# Patient Record
Sex: FEMALE | Race: WHITE | Employment: OTHER | URBAN - METROPOLITAN AREA
[De-identification: names, ages, dates, MRNs, and addresses within clinical notes are randomized per-mention and may not be internally consistent; named-entity substitution may affect disease eponyms.]

---

## 2017-01-27 RX ORDER — BENAZEPRIL HYDROCHLORIDE 10 MG/1
TABLET ORAL
Qty: 90 TAB | Refills: 0 | Status: SHIPPED | OUTPATIENT
Start: 2017-01-27 | End: 2017-05-07 | Stop reason: SDUPTHER

## 2017-01-27 RX ORDER — AMLODIPINE BESYLATE 2.5 MG/1
TABLET ORAL
Qty: 90 TAB | Refills: 0 | Status: SHIPPED | OUTPATIENT
Start: 2017-01-27 | End: 2017-05-07 | Stop reason: SDUPTHER

## 2017-02-20 ENCOUNTER — OFFICE VISIT (OUTPATIENT)
Dept: CARDIOLOGY CLINIC | Age: 82
End: 2017-02-20

## 2017-02-20 DIAGNOSIS — Z95.0 CARDIAC PACEMAKER IN SITU: Primary | ICD-10-CM

## 2017-02-28 ENCOUNTER — HOSPITAL ENCOUNTER (OUTPATIENT)
Dept: LAB | Age: 82
Discharge: HOME | End: 2017-02-28
Payer: MEDICARE

## 2017-02-28 ENCOUNTER — OFFICE VISIT (OUTPATIENT)
Dept: INTERNAL MEDICINE CLINIC | Age: 82
End: 2017-02-28

## 2017-02-28 VITALS
HEIGHT: 65 IN | BODY MASS INDEX: 35.72 KG/M2 | WEIGHT: 214.4 LBS | TEMPERATURE: 97.7 F | HEART RATE: 89 BPM | OXYGEN SATURATION: 94 % | SYSTOLIC BLOOD PRESSURE: 120 MMHG | DIASTOLIC BLOOD PRESSURE: 80 MMHG | RESPIRATION RATE: 16 BRPM

## 2017-02-28 DIAGNOSIS — E11.9 DIABETES MELLITUS TYPE 2, DIET-CONTROLLED (HCC): ICD-10-CM

## 2017-02-28 DIAGNOSIS — Z79.899 ENCOUNTER FOR LONG-TERM (CURRENT) DRUG USE: ICD-10-CM

## 2017-02-28 DIAGNOSIS — E78.2 MIXED HYPERLIPIDEMIA: ICD-10-CM

## 2017-02-28 DIAGNOSIS — I10 BENIGN HYPERTENSION: Primary | ICD-10-CM

## 2017-02-28 PROCEDURE — 36415 COLL VENOUS BLD VENIPUNCTURE: CPT

## 2017-02-28 PROCEDURE — 80053 COMPREHEN METABOLIC PANEL: CPT

## 2017-02-28 PROCEDURE — 83036 HEMOGLOBIN GLYCOSYLATED A1C: CPT

## 2017-02-28 NOTE — PROGRESS NOTES
Kelsey Baxter is a 80 y.o. female    Chief Complaint   Patient presents with    Follow-up     diabetes mellitus type 2, hypertension and hyperlipidemia     1. Have you been to the ER, urgent care clinic since your last visit? Hospitalized since your last visit? No    2. Have you seen or consulted any other health care providers outside of the [address] [place] [address] since your last visit? Include any pap smears or colon screening.  No

## 2017-02-28 NOTE — PROGRESS NOTES
Subjective:      Cyndee Lord is a 80 y.o. female who presents today hypertension , hyperlipidemia and diet controlled diabetes mellitus     Living with her son. She has been doing well. She has a pacemaker and follows with her cardiologist regularly. She recently had cortisone injections in her right elbow and shoulder. Still bowling periodically. She denies any chest pain, shortness of breath, syncope, headaches, nausea/vomiting, or any bowel changes. She ran out of zetia about 1 month ago. Has not taken it since. 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     hyperlipidemia E78.5    Hypertension, benign I10    Cardiac pacemaker in situ Z95.0    S/P FERNANDO-BSO Z90.710, Z90.722, Z90.79    Allergy to environmental factors Z91.09    Rotator cuff tear, right M75.101    S/P right cataract extraction Z98.41    History of screening mammography Z92.89    Colon cancer screening Z12.11    Diabetes type 2, controlled ([facility] E11.9    Advance directive discussed with patient Z71.89    Diabetic [facility] exam ([facility] E11.9, Z01.00     Current Outpatient Prescriptions   Medication Sig Dispense Refill    amLODIPine (NORVASC) 2.5 mg tablet TAKE ONE TABLET BY MOUTH DAILY 90 Tab 0    benazepril (LOTENSIN) 10 mg tablet TAKE ONE TABLET BY MOUTH DAILY (GENERIC FOR LOTENSIN) 90 Tab 0    TRIAMCINOLONE ACETONIDE (NASACORT NA) by Nasal route as needed.  ezetimibe (ZETIA) 10 mg tablet Take 1 Tab by mouth daily. 90 Tab 3    co-enzyme Q-10 (CO Q-10) 100 mg capsule Take 1 Cap by mouth daily. 30 Cap 5    ibuprofen (ADVIL) 200 mg tablet Take  by mouth every six (6) hours as needed.  cetirizine (ZYRTEC) 5 mg tablet Take 10 mg by mouth daily. Review of Systems    Pertinent items are noted in HPI. Objective:      Wt Readings from Last 3 Encounters:   02/28/17 214 lb 6.4 oz (97.3 kg)   08/16/16 214 lb (97.1 kg)   07/07/16 215 lb (97.5 kg) Temp Readings from Last 3 Encounters:   02/28/17 97.7 °F (36.5 °C) (Oral)   08/16/16 97.3 °F (36.3 °C) (Oral)   07/07/16 97 °F (36.1 °C) (Oral)     BP Readings from Last 3 Encounters:   02/28/17 140/82   08/16/16 130/64   07/07/16 110/70     Pulse Readings from Last 3 Encounters:   02/28/17 89   08/16/16 88   07/07/16 95      Visit Vitals    /80    Pulse 89    Temp 97.7 °F (36.5 °C) (Oral)    Resp 16    Ht 5' 5\" (1.651 m)    Wt 214 lb 6.4 oz (97.3 kg)    SpO2 94%    BMI 35.68 kg/m2     General appearance: alert, cooperative, no distress, appears stated age  Head: Normocephalic, without obvious abnormality, atraumatic  Neck: supple, symmetrical, trachea midline, no adenopathy, no carotid bruit and no JVD  Lungs: clear to auscultation bilaterally  Heart: regular rate and rhythm, S1, S2 normal, no murmur, click, rub or gallop    Assessment/Plan:     1. Benign hypertension  -I evaluated and recommended to continue current doses of medications.     - METABOLIC PANEL, COMPREHENSIVE    2. Mixed hyperlipidemia  -has not taken zetia for last month. She is on the zetia prescription assistance plan. She may need to reapply, which she does yearly. - METABOLIC PANEL, COMPREHENSIVE    3. Diabetes mellitus type 2, diet-controlled ([facility]  -due for labs. Continue low sugar and carb diet. - METABOLIC PANEL, COMPREHENSIVE  - HEMOGLOBIN A1C WITH EAG    4. Encounter for long-term (current) drug use    - METABOLIC PANEL, COMPREHENSIVE     Orders Placed This Encounter    METABOLIC PANEL, COMPREHENSIVE    HEMOGLOBIN A1C WITH EAG        Follow-up Disposition:     Follow up 4 months      Return if symptoms worsen or fail to improve. Advised patient to call back or return to office if symptoms worsen/change/persist.     Discussed expected course/resolution/complications of diagnosis in detail with patient. Medication risks/benefits/costs/interactions/alternatives discussed with patient.    Patient was given an after visit summary which includes diagnoses, current medications, & vitals. Patient expressed understanding with the diagnosis and plan.

## 2017-02-28 NOTE — MR AVS SNAPSHOT
Visit Information Date & Time Provider Department Dept. Phone Encounter #  
 2/28/2017 11:40 AM Yisel Amin MD [place] [address] [facility] [phone] [id number] Follow-up Instructions Return in about 4 months (around 6/28/2017). Your Appointments 5/26/2017 11:30 AM  
PACEMAKER with [id number] (YULI SCHEDULING) Appt Note: mdt ppi, rc, annual thresh and new [address] [place]  
[address] [facility]  
  
    
 5/26/2017 11:40 AM  
ESTABLISHED PATIENT with Lonnie Dominguez MD  
[facility] ([facility]) Appt Note: 1 yr fu appt device check and seeing dr Tolentino [address] [place] [address]  
[address] [place] [address] Upcoming Health Maintenance Date Due  
 MEDICARE YEARLY EXAM 5/31/1999 Pneumococcal 65+ Low/Medium Risk (2 of 2 - PPSV23) 1/1/2010 DTaP/Tdap/Td series (2 - Td) 10/1/2016 HEMOGLOBIN A1C Q6M 2/16/2017 FOOT EXAM Q1 4/13/2017 MICROALBUMIN Q1 4/13/2017 EYE EXAM RETINAL OR DILATED Q1 5/12/2017 LIPID PANEL Q1 8/16/2017 GLAUCOMA SCREENING Q2Y 5/12/2018 Allergies as of 2/28/2017  Review Complete On: 2/28/2017 By: Yisel Amin MD  
  
 Severity Noted Reaction Type Reactions Ciprofloxacin  03/01/2012   Side Effect Other (comments) \"jittery\" Lipitor [Atorvastatin]  03/01/2012   Side Effect Myalgia Mevacor [Lovastatin]  03/01/2012   Side Effect Other (comments) dyspepsia Pcn [Penicillins]  04/04/2011   Side Effect Hives Zocor [Simvastatin]  03/01/2012   Side Effect Myalgia Current Immunizations  Reviewed on 8/16/2016 Name Date Influenza Vaccine 12/11/2015, 10/1/2014, 11/1/2012 Pneumococcal Vaccine (Unspecified Type) 1/1/2005 TDAP Vaccine 10/1/2006 Zoster Vaccine, Live 11/1/2012 Not reviewed this visit You Were Diagnosed With   
  
 Codes Comments Benign hypertension    -  Primary ICD-10-CM: I10 
ICD-9-CM: 401.1 Mixed hyperlipidemia     ICD-10-CM: E78.2 ICD-9-CM: 272.2 Diabetes mellitus type 2, diet-controlled ([facility]     ICD-10-CM: E11.9 ICD-9-CM: 250.00 Encounter for long-term (current) drug use     ICD-10-CM: Z79.899 ICD-9-CM: V58.69 Vitals BP  
  
  
  
  
  
 120/80 Vitals History BMI and BSA Data Body Mass Index Body Surface Area  
 35.68 kg/m 2 2.11 m 2 Preferred Pharmacy Pharmacy Name Phone [facility] [place], [address] [facility]. [phone] Your Updated Medication List  
  
   
This list is accurate as of: 2/28/17  1:21 PM.  Always use your most recent med list. ADVIL 200 mg tablet Generic drug:  ibuprofen Take  by mouth every six (6) hours as needed. amLODIPine 2.5 mg tablet Commonly known as:  Sanchez Fanti TAKE ONE TABLET BY MOUTH DAILY  
  
 benazepril 10 mg tablet Commonly known as:  LOTENSIN  
TAKE ONE TABLET BY MOUTH DAILY (GENERIC FOR LOTENSIN) co-enzyme Q-10 100 mg capsule Commonly known as:  CO Q-10 Take 1 Cap by mouth daily. ezetimibe 10 mg tablet Commonly known as:  Mallie Drea Take 1 Tab by mouth daily. NASACORT NA  
by Nasal route as needed. ZyrTEC 5 mg tablet Generic drug:  cetirizine Take 10 mg by mouth daily. We Performed the Following HEMOGLOBIN A1C WITH EAG [51767 CPT(R)] METABOLIC PANEL, COMPREHENSIVE [52478 CPT(R)] Follow-up Instructions Return in about 4 months (around 6/28/2017). Introducing [facility]! Dear [place]: 
Thank you for requesting a [employer] account. Our records indicate that you already have an active [employer] account.   You can access your account anytime at [email or web address] Did you know that you can access your hospital and ER discharge instructions at any time in [employer]? You can also review all of your test results from your hospital stay or ER visit. Additional Information If you have questions, please visit the Frequently Asked Questions section of the [employer] website at [email or web address] Remember, [employer] is NOT to be used for urgent needs. For medical emergencies, dial 911. Now available from your iPhone and Android! Please provide this summary of care documentation to your next provider. Your primary care clinician is listed as Brea Lee. If you have any questions after today's visit, please call [phone].

## 2017-03-01 LAB
ALBUMIN SERPL-MCNC: 4.6 G/DL (ref 3.5–4.7)
ALBUMIN/GLOB SERPL: 1.9 {RATIO} (ref 1.1–2.5)
ALP SERPL-CCNC: 67 IU/L (ref 39–117)
ALT SERPL-CCNC: 24 IU/L (ref 0–32)
AST SERPL-CCNC: 23 IU/L (ref 0–40)
BILIRUB SERPL-MCNC: 0.5 MG/DL (ref 0–1.2)
BUN SERPL-MCNC: 28 MG/DL (ref 8–27)
BUN/CREAT SERPL: 30 (ref 11–26)
CALCIUM SERPL-MCNC: 9.5 MG/DL (ref 8.7–10.3)
CHLORIDE SERPL-SCNC: 100 MMOL/L (ref 96–106)
CO2 SERPL-SCNC: 23 MMOL/L (ref 18–29)
CREAT SERPL-MCNC: 0.93 MG/DL (ref 0.57–1)
EST. AVERAGE GLUCOSE BLD GHB EST-MCNC: 143 MG/DL
GLOBULIN SER CALC-MCNC: 2.4 G/DL (ref 1.5–4.5)
GLUCOSE SERPL-MCNC: 108 MG/DL (ref 65–99)
HBA1C MFR BLD: 6.6 % (ref 4.8–5.6)
INTERPRETATION: NORMAL
Lab: NORMAL
POTASSIUM SERPL-SCNC: 4.4 MMOL/L (ref 3.5–5.2)
PROT SERPL-MCNC: 7 G/DL (ref 6–8.5)
SODIUM SERPL-SCNC: 141 MMOL/L (ref 134–144)

## 2017-05-08 RX ORDER — AMLODIPINE BESYLATE 2.5 MG/1
TABLET ORAL
Qty: 90 TAB | Refills: 0 | Status: SHIPPED | OUTPATIENT
Start: 2017-05-08 | End: 2017-08-08 | Stop reason: SDUPTHER

## 2017-05-08 RX ORDER — BENAZEPRIL HYDROCHLORIDE 10 MG/1
TABLET ORAL
Qty: 90 TAB | Refills: 0 | Status: SHIPPED | OUTPATIENT
Start: 2017-05-08 | End: 2017-08-08 | Stop reason: SDUPTHER

## 2017-06-01 ENCOUNTER — OFFICE VISIT (OUTPATIENT)
Dept: INTERNAL MEDICINE CLINIC | Age: 82
End: 2017-06-01

## 2017-06-01 VITALS
HEIGHT: 65 IN | OXYGEN SATURATION: 93 % | WEIGHT: 214 LBS | TEMPERATURE: 99.2 F | HEART RATE: 65 BPM | DIASTOLIC BLOOD PRESSURE: 60 MMHG | BODY MASS INDEX: 35.65 KG/M2 | RESPIRATION RATE: 14 BRPM | SYSTOLIC BLOOD PRESSURE: 134 MMHG

## 2017-06-01 DIAGNOSIS — Z13.39 SCREENING FOR ALCOHOLISM: ICD-10-CM

## 2017-06-01 DIAGNOSIS — Z00.00 MEDICARE ANNUAL WELLNESS VISIT, SUBSEQUENT: Primary | ICD-10-CM

## 2017-06-01 DIAGNOSIS — J06.9 VIRAL URI WITH COUGH: ICD-10-CM

## 2017-06-01 DIAGNOSIS — Z00.00 ROUTINE GENERAL MEDICAL EXAMINATION AT A HEALTH CARE FACILITY: ICD-10-CM

## 2017-06-01 DIAGNOSIS — Z13.31 SCREENING FOR DEPRESSION: ICD-10-CM

## 2017-06-01 DIAGNOSIS — R07.89 CHEST TIGHTNESS: ICD-10-CM

## 2017-06-01 RX ORDER — ALBUTEROL SULFATE 90 UG/1
2 AEROSOL, METERED RESPIRATORY (INHALATION)
Qty: 1 INHALER | Refills: 0 | Status: ON HOLD | OUTPATIENT
Start: 2017-06-01 | End: 2017-08-18

## 2017-06-01 RX ORDER — AZITHROMYCIN 250 MG/1
250 TABLET, FILM COATED ORAL SEE ADMIN INSTRUCTIONS
Qty: 6 TAB | Refills: 0 | Status: SHIPPED | OUTPATIENT
Start: 2017-06-01 | End: 2017-06-06

## 2017-06-01 NOTE — PATIENT INSTRUCTIONS
1. Mucinex (Guaifenesen) plain-Blue Box 600 mg. Take one twice daily with full glass water   Take for 10 days    2. Saline Nasal Spray - use liberally to flush post-nasal area; use as many times a day as desired. Keep spraying with head tilted back until you feel need to swallow    3. Drink lots of fluids (mainly water) to keep mucus thinner    4. If needed, for cough, we recommend Delsym cough syrup    5. Decongestants should only be used for about 3 days. After that, they actually contribute to overdrying/thickening of the mucus, and can raise the BP and overstimulate the heart    6. Steroid nasal spray (Nasacort AQ) - 2 sprays each nostril once daily; use with head in upright position    Medicare Part B Preventive Services Guidelines/Limitations Date last completed and Frequency Due Date   Bone Mass Measurement  (age 72 & older, biennial) Requires diagnosis related to osteoporosis or estrogen deficiency.  Biennial benefit unless patient has history of long-term glucocorticoid tx or baseline is needed because initial test was by other method Completed Recommended every 2 years Not indicated   Cardiovascular Screening Blood Tests (every 5 years)  Total cholesterol, HDL, Triglycerides Order as a panel if possible Completed 8/16/2016  Recommended annually Due 8/16/2017   Colorectal Cancer Screening  -Fecal occult blood test (annual)  -Flexible sigmoidoscopy (5y)  -Screening colonoscopy (10y)  -Barium Enema  Completed 2010  Recommended every 5-10 years  Complete    Counseling to Prevent Tobacco Use (up to 8 sessions per year)  - Counseling greater than 3 and up to 10 minutes  - Counseling greater than 10 minutes Patients must be asymptomatic of tobacco-related conditions to receive as preventive service  Not applicable     Diabetes Screening Tests (at least every 3 years, Medicare covers annually or at 6-month intervals for prediabetic patients)    Fasting blood sugar (FBS) or glucose tolerance test (GTT) Patient must be diagnosed with one of the following:  -Hypertension, Dyslipidemia, obesity, previous impaired FBS or GTT  Or any two of the following: overweight, FH of diabetes, age ? 72, history of gestational diabetes, birth of baby weighing more than 9 pounds Completed: 2/28/2017    Recommended every 3 years for non-diabetics    Recommended every 3-6 months for Pre-Diabetics and Diabetics Due 6/2017   Diabetes Self-Management Training (DSMT) (no USPSTF recommendation) Requires referral by treating physician for patient with diabetes or renal disease. 10 hours of initial DSMT session of no less than 30 minutes each in a continuous 12-month period. 2 hours of follow-up DSMT in subsequent years. Please let us know if you would like a referral   Glaucoma Screening (no USPSTF recommendation) Diabetes mellitus, family history, , age 48 or over,  American, age 72 or over Completed May 2017  Recommended annually Due May 2018   Human Immunodeficiency Virus (HIV) Screening (annually for increased risk patients)  HIV-1 and HIV-2 by EIA, MING, rapid antibody test, or oral mucosa transudate Patient must be at increased risk for HIV infection per USPSTF guidelines or pregnant. Tests covered annually for patients at increased risk. Pregnant patients may receive up to 3 test during pregnancy. Not applicable   Medical Nutrition Therapy (MNT) (for diabetes or renal disease not recommended schedule) Requires referral by treating physician for patient with diabetes or renal disease. Can be provided in same year as diabetes self-management training (DSMT), and CMS recommends medical nutrition therapy take place after DSMT. Up to 3 hours for initial year and 2 hours in subsequent years.   Please let us know if you would like a referral   Prostate Cancer Screening (annually up to age 76)  - Digital rectal exam (MARIO)  - Prostate specific antigen (PSA) Annually (age 48 or over), MARIO not paid separately when covered E/M service is provided on same date Completed  Recommended annually to age 76 Not applicable   Seasonal Influenza Vaccination (annually)  Completed 11/15/2016  Recommended Annually Due Fall 2017   Pneumococcal Vaccination (once after 72)  Pneumococcal 23 -10/19/2005  Recommended once over the age of 72    Prevnar 15 - 11/15/2016  Recommended once over the age of 72 Complete          Complete   Hepatitis B Vaccinations (if medium/high risk) Medium/high risk factors:  End-stage renal disease,  Hemophiliacs who received Factor VIII or IX concentrates, Clients of institutions for the mentally retarded, Persons who live in the same house as a HepB virus carrier, Homosexual men, Illicit injectable drug abusers. Not applicable   Screening Mammography (biennial age 54-69)? Annually (age 36 or over) Completed 2012   Complete   Screening Pap Tests and Pelvic Examination (up to age 79 and after 79 if unknown history or abnormal study last 10 years) Every 24 months except high risk Completed  Complete     Ultrasound Screening for Abdominal Aortic Aneurysm (AAA) (once) Patient must be referred through IPPE and not have had a screening for abdominal aortic aneurysm before under Medicare. Limited to patients who meet one of the following criteria:  - Men who are 73-68 years old and have smoked more than 100 cigarettes in their lifetime.  -Anyone with a FH of AAA  -Anyone recommended for screening by USPSTF  Not applicable   Family Practice Management 2011  When you completed your advance medical directive please bring a copy to the office to add to your medical record.

## 2017-06-01 NOTE — PROGRESS NOTES
79 yo female reports drippy nose, sneezing and shortness of breath for last 4-5 days. She has been using Nasacort AQ and OTC Delsym. She had been staying at her place on the [facility], and with all the rain, she believes this is what started her sxs which initially started with a head cold. She came back to [address] yesterday after the cough and feeling of tightness in the lower chest worsened. There is not much sputum production. PE: Overweight WF w/ coarse cough   T - 99.2   Phar - red   Neck - no nodes   Lungs - clear on inspiration, but coarse rhonchi on exhalation    Imp: URI w/ cough   Bronchospasm    Plan: Mucinex BID   Increase hydration    Rescue inhaler   Z-pack  _____________________________  Expected course of current diagnosed problem(s) as well as expected progression and possible complications, and desired follow up with provider are discussed with patient. Patient is encouraged to be back in touch with any questions or concerns. Patient expresses understanding of plan of care. Patient is given AVS which includes diagnoses, current medications, vitals.

## 2017-06-01 NOTE — PROGRESS NOTES
This is an Initial Medicare Annual Wellness Exam (AWV) (Performed 12 months after IPPE or effective date of Medicare Part B enrollment, Once in a lifetime)    I have reviewed the patient's medical history in detail and updated the computerized patient record. History     Past Medical History:   Diagnosis Date     hyperlipidemia 4/4/2011    Allergy to environmental factors 3/2/2012    Atrioventricular block, complete ([address] 4/4/2011    Cardiac pacemaker in situ 4/4/2011    Hypercholesterolemia     Hypertension     Hypertension, benign 4/4/2011    Rotator cuff tear 8/2011    right    S/P FERNANDO-BSO 3/2/2012      Past Surgical History:   Procedure Laterality Date    CARDIAC SURG PROCEDURE UNLIST  03/08    Pacemaker Battery Replacement    CARDIAC SURG PROCEDURE UNLIST  1996    Pacemaker Implant    ENDOSCOPY, COLON, DIAGNOSTIC  11/18/08    polyp (Brand)    HX HEENT  6/7/10    ELIAZAR O.D.    HX ORTHOPAEDIC  1992    R Elbow Fx - ORIF    HX FERNANDO AND BSO  1975     Current Outpatient Prescriptions   Medication Sig Dispense Refill    azithromycin (ZITHROMAX) 250 mg tablet Take 1 Tab by mouth See Admin Instructions for 5 days. 6 Tab 0    albuterol (PROVENTIL HFA, VENTOLIN HFA, PROAIR HFA) 90 mcg/actuation inhaler Take 2 Puffs by inhalation every six (6) hours as needed for Wheezing (or chest tightness). 1 Inhaler 0    amLODIPine (NORVASC) 2.5 mg tablet TAKE ONE TABLET BY MOUTH DAILY 90 Tab 0    benazepril (LOTENSIN) 10 mg tablet TAKE ONE TABLET BY MOUTH DAILY **GENERIC LOTENSIN** 90 Tab 0    TRIAMCINOLONE ACETONIDE (NASACORT NA) by Nasal route as needed.  ezetimibe (ZETIA) 10 mg tablet Take 1 Tab by mouth daily. 90 Tab 3    co-enzyme Q-10 (CO Q-10) 100 mg capsule Take 1 Cap by mouth daily. 30 Cap 5    ibuprofen (ADVIL) 200 mg tablet Take  by mouth every six (6) hours as needed.  cetirizine (ZYRTEC) 5 mg tablet Take 10 mg by mouth daily.        Allergies   Allergen Reactions    Ciprofloxacin Other (comments)     \"jittery\"    Lipitor [Atorvastatin] Myalgia    Mevacor [Lovastatin] Other (comments)     dyspepsia    Pcn [Penicillins] Hives    Zocor [Simvastatin] Myalgia     Family History   Problem Relation Age of Onset    Cancer Mother     Stroke Father      Social History   Substance Use Topics    Smoking status: Former Smoker     Quit date: 4/8/2007    Smokeless tobacco: Never Used    Alcohol use 3.5 - 5.0 oz/week     7 - 10 Standard drinks or equivalent per week     Patient Active Problem List   Diagnosis Code    [facility] block[facility] ([facility] I44.2     hyperlipidemia E78.5    Hypertension, benign I10    Cardiac pacemaker in situ Z95.0    S/P FERNANDO-BSO Z90.710, Z90.722, Z90.79    Allergy to environmental factors Z91.09    Rotator cuff tear, right M75.101    S/P right cataract extraction Z98.41    History of screening mammography Z92.89    Colon cancer screening Z12.11    Diabetes type 2, controlled ([facility] E11.9    Advance directive discussed with patient Z71.89    Diabetic eye exam ([facility] E11.9, Z01.00         Depression Risk Factor Screening:     PHQ over the last two weeks 6/1/2017   Little interest or pleasure in doing things Not at all   Feeling down, depressed or hopeless Not at all   Total Score PHQ 2 0     Alcohol Risk Factor Screening: On any occasion during the past 3 months, have you had more than 3 drinks containing alcohol? Yes    Do you average more than 7 drinks per week? Unknown; patient states she did not know how much she drinks at one time. Functional Ability and Level of Safety:     Hearing Loss   None, per patient    Activities of Daily Living   Self-care.    Requires assistance with:   ADL Assessment 6/1/2017   Feeding yourself No Help Needed   Getting from bed to chair No Help Needed   Getting dressed No Help Needed   Bathing or showering No Help Needed   Walk across the room (includes cane/walker) No Help Needed   Using the telphone No Help Needed Taking your medications No Help Needed   Preparing meals No Help Needed   Managing money (expenses/bills) No Help Needed   Moderately strenuous housework (laundry) No Help Needed   Shopping for personal items (toiletries/medicines) No Help Needed   Shopping for groceries No Help Needed   Driving No Help Needed   Climbing a flight of stairs No Help Needed   Getting to places beyond walking distances No Help Needed     Patient states she walks for exercise. States she lives with her adult son. Patient state she independent. Denies using any assistive devices for ambulation. Fall Risk     Fall Risk Assessment, last 12 mths 6/1/2017   Able to walk? Yes   Fall in past 12 months? No     Abuse Screen      Abuse Screening Questionnaire 6/1/2017   Do you ever feel afraid of your partner? N   Are you in a relationship with someone who physically or mentally threatens you? N   Is it safe for you to go home? Y         Review of Systems   Not required  Annual Medicare Wellness Visit    Physical Examination     No exam data present    Evaluation of Cognitive Function:  Mood/affect:  neutral  Appearance: age appropriate, well dressed and within normal Limits  Family member/caregiver input: none present    No exam performed today, Annual Medicare Wellness Visit. Patient Care Team:  Zeenat Wayne MD as PCP - General (Internal Medicine)  Zeenat Wayne MD (Internal Medicine)  Cris Rocha [[id number]] (Cardiology)  Victoria Aguilera MD (Ophthalmology)    Advice/Referrals/Counseling   Education and counseling provided:  Are appropriate based on today's review and evaluation  End-of-Life planning (with patient's consent)  Screening Mammography  Screening for glaucoma      Assessment/Plan   1. Discussed advance care planning. Patient states she does have paperwork completed at home and states she will bring at next appointment to scan. 2. Discussed preventative screenings. Patient states she has not had a mammogram and quite some time. States she does not feel the need to have one at this time. Patient will contact office if she decides to have one in the future. Patient states she had glaucoma screening last week with Dr. Krzysztof Sam. AVS and preventative screenings table printed, reviewed, and handed to patient. Patient verbalized understanding to all information.

## 2017-06-01 NOTE — MR AVS SNAPSHOT
Visit Information Date & Time Provider Department Dept. Phone Encounter #  
 6/1/2017 11:00 AM [facility] [phone] Your Appointments 6/28/2017 10:45 AM  
PACEMAKER with PACEMAKER3RHIANNA [facility] (YULI SCHEDULING) Appt Note: 1 yr fu appt device check and seeing dr Santos Salas; 1 yr fu appt device check and seeing dr Young Parikh pt r/s from 5/26  
 [address]  
[phone]  
  
   
 [address]  
  
    
 6/28/2017 11:20 AM  
ESTABLISHED PATIENT with Kofi Griffith MD  
[facility] ([facility]) Appt Note: 1 yr fu appt device check and seeing dr Young Parikh pt r/s from 5/26  
 [address]  
[address] [address]  
  
    
 6/28/2017 11:40 AM  
ROUTINE CARE with MD Hilda Ruiz [facility] ([facility]) Appt Note: 4 mths [address], [address]  
[address], [address]ela Khurram [address] [address] Upcoming Health Maintenance Date Due  
 FOOT EXAM Q1 4/13/2017 MICROALBUMIN Q1 4/13/2017 EYE EXAM RETINAL OR DILATED Q1 5/12/2017 INFLUENZA AGE 9 TO ADULT 8/1/2017 LIPID PANEL Q1 8/16/2017 HEMOGLOBIN A1C Q6M 8/28/2017 GLAUCOMA SCREENING Q2Y 5/12/2018 MEDICARE YEARLY EXAM 6/2/2018 DTaP/Tdap/Td series (3 - Td) 11/1/2025 Allergies as of 6/1/2017  Review Complete On: 6/1/2017 By: Madelaine Rodgers LPN Severity Noted Reaction Type Reactions Ciprofloxacin  03/01/2012   Side Effect Other (comments) \"jittery\" Lipitor [Atorvastatin]  03/01/2012   Side Effect Myalgia Mevacor [Lovastatin]  03/01/2012   Side Effect Other (comments) dyspepsia Pcn [Penicillins]  04/04/2011   Side Effect Hives Zocor [Simvastatin]  03/01/2012   Side Effect Myalgia Current Immunizations  Reviewed on 2/28/2017 Name Date Influenza Vaccine 12/11/2015, 10/1/2014, 11/1/2012 Pneumococcal Conjugate (PCV-13) 11/15/2016 Pneumococcal Vaccine (Unspecified Type) 1/1/2005 TDAP Vaccine 10/1/2006 Tdap 11/1/2015 Zoster Vaccine, Live 11/1/2012 Not reviewed this visit You Were Diagnosed With   
  
 Codes Comments Medicare annual wellness visit, subsequent    -  Primary ICD-10-CM: Z00.00 ICD-9-CM: V70.0 Viral URI with cough     ICD-10-CM: J06.9, B97.89 ICD-9-CM: 465.9 Chest tightness     ICD-10-CM: R07.89 ICD-9-CM: 786.59 Vitals BP Pulse Temp Resp Height(growth percentile) Weight(growth percentile) 134/60 65 99.2 °F (37.3 °C) (Oral) 14 5' 5\" (1.651 m) 214 lb (97.1 kg) SpO2 BMI OB Status Smoking Status 93% 35.61 kg/m2 Hysterectomy Former Smoker Vitals History BMI and BSA Data Body Mass Index Body Surface Area  
 35.61 kg/m 2 2.11 m 2 Preferred Pharmacy Pharmacy Name Phone [facility] Vu [address] [place] [address], [facility] Yara Velasco RDS. [phone] Your Updated Medication List  
  
   
This list is accurate as of: 6/1/17 12:03 PM.  Always use your most recent med list. ADVIL 200 mg tablet Generic drug:  ibuprofen Take  by mouth every six (6) hours as needed. albuterol 90 mcg/actuation inhaler Commonly known as:  PROVENTIL HFA, VENTOLIN HFA, PROAIR HFA Take 2 Puffs by inhalation every six (6) hours as needed for Wheezing (or chest tightness). amLODIPine 2.5 mg tablet Commonly known as:  Sherald Burkitt TAKE ONE TABLET BY MOUTH DAILY  
  
 azithromycin 250 mg tablet Commonly known as:  Lennox Prieto Take 1 Tab by mouth See Admin Instructions for 5 days. benazepril 10 mg tablet Commonly known as:  LOTENSIN  
 TAKE ONE TABLET BY MOUTH DAILY **GENERIC LOTENSIN**  
  
 co-enzyme Q-10 100 mg capsule Commonly known as:  CO Q-10 Take 1 Cap by mouth daily. ezetimibe 10 mg tablet Commonly known as:  Elver Heftrufino Take 1 Tab by mouth daily. NASACORT NA  
by Nasal route as needed. ZyrTEC 5 mg tablet Generic drug:  cetirizine Take 10 mg by mouth daily. Prescriptions Sent to Pharmacy Refills  
 azithromycin (ZITHROMAX) 250 mg tablet 0 Sig: Take 1 Tab by mouth See Admin Instructions for 5 days. Class: Normal  
 Pharmacy: [address] Ph #: [phone] Route: Oral  
 albuterol (PROVENTIL HFA, VENTOLIN HFA, PROAIR HFA) 90 mcg/actuation inhaler 0 Sig: Take 2 Puffs by inhalation every six (6) hours as needed for Wheezing (or chest tightness). Class: Normal  
 Pharmacy: [address] Ph #: [phone] Route: Inhalation Patient Instructions 1. Mucinex (Guaifenesen) plain-Blue Box 600 mg. Take one twice daily with full glass water Take for 10 days 2. Saline Nasal Spray - use liberally to flush post-nasal area; use as many times a day as desired. Keep spraying with head tilted back until you feel need to swallow 3. Drink lots of fluids (mainly water) to keep mucus thinner 4. If needed, for cough, we recommend Delsym cough syrup 5. Decongestants should only be used for about 3 days. After that, they actually contribute to overdrying/thickening of the mucus, and can raise the BP and overstimulate the heart 6. Steroid nasal spray (Nasacort AQ) - 2 sprays each nostril once daily; use with head in upright position Medicare Part B Preventive Services Guidelines/Limitations Date last completed and Frequency Due Date Bone Mass Measurement (age 72 & older, biennial) Requires diagnosis related to osteoporosis or estrogen deficiency. Biennial benefit unless patient has history of long-term glucocorticoid tx or baseline is needed because initial test was by other method Completed Recommended every 2 years Not indicated Cardiovascular Screening Blood Tests (every 5 years) Total cholesterol, HDL, Triglycerides Order as a panel if possible Completed 8/16/2016 Recommended annually Due 8/16/2017 Colorectal Cancer Screening 
-Fecal occult blood test (annual) -Flexible sigmoidoscopy (5y) 
-Screening colonoscopy (10y) -Barium Enema  Completed 2010 Recommended every 5-10 years  Complete Counseling to Prevent Tobacco Use (up to 8 sessions per year) - Counseling greater than 3 and up to 10 minutes - Counseling greater than 10 minutes Patients must be asymptomatic of tobacco-related conditions to receive as preventive service  Not applicable Diabetes Screening Tests (at least every 3 years, Medicare covers annually or at 6-month intervals for prediabetic patients) Fasting blood sugar (FBS) or glucose tolerance test (GTT) Patient must be diagnosed with one of the following: 
-Hypertension, Dyslipidemia, obesity, previous impaired FBS or GTT 
Or any two of the following: overweight, FH of diabetes, age ? 72, history of gestational diabetes, birth of baby weighing more than 9 pounds Completed: 2/28/2017 Recommended every 3 years for non-diabetics Recommended every 3-6 months for Pre-Diabetics and Diabetics Due 6/2017 Diabetes Self-Management Training (DSMT) (no USPSTF recommendation) Requires referral by treating physician for patient with diabetes or renal disease. 10 hours of initial DSMT session of no less than 30 minutes each in a continuous 12-month period. 2 hours of follow-up DSMT in subsequent years.   Please let us know if you would like a referral  
Glaucoma Screening (no USPSTF recommendation) Diabetes mellitus, family history, , age 48 or over,  American, age 72 or over Completed May 2017 Recommended annually Due May 2018 Human Immunodeficiency Virus (HIV) Screening (annually for increased risk patients) HIV-1 and HIV-2 by EIA, MING, rapid antibody test, or oral mucosa transudate Patient must be at increased risk for HIV infection per USPSTF guidelines or pregnant. Tests covered annually for patients at increased risk. Pregnant patients may receive up to 3 test during pregnancy. Not applicable Medical Nutrition Therapy (MNT) (for diabetes or renal disease not recommended schedule) Requires referral by treating physician for patient with diabetes or renal disease. Can be provided in same year as diabetes self-management training (DSMT), and CMS recommends medical nutrition therapy take place after DSMT. Up to 3 hours for initial year and 2 hours in subsequent years. Please let us know if you would like a referral  
Prostate Cancer Screening (annually up to age 76) - Digital rectal exam (MARIO) - Prostate specific antigen (PSA) Annually (age 48 or over), MARIO not paid separately when covered E/M service is provided on same date Completed Recommended annually to age 76 Not applicable Seasonal Influenza Vaccination (annually)  Completed 11/15/2016 Recommended Annually Due Fall 2017 Pneumococcal Vaccination (once after 65)  Pneumococcal 23 -10/19/2005 Recommended once over the age of 72 Prevnar 13  11/15/2016 Recommended once over the age of 72 Complete Complete Hepatitis B Vaccinations (if medium/high risk) Medium/high risk factors:  End-stage renal disease, Hemophiliacs who received Factor VIII or IX concentrates, Clients of institutions for the mentally retarded, Persons who live in the same house as a HepB virus carrier, Homosexual men, Illicit injectable drug abusers. Not applicable Screening Mammography (biennial age 54-69)? Annually (age 36 or over) Completed 2012 Complete Screening Pap Tests and Pelvic Examination (up to age 79 and after 79 if unknown history or abnormal study last 10 years) Every 24 months except high risk Completed  Complete Ultrasound Screening for Abdominal Aortic Aneurysm (AAA) (once) Patient must be referred through IPPE and not have had a screening for abdominal aortic aneurysm before under Medicare. Limited to patients who meet one of the following criteria: 
- Men who are 73-68 years old and have smoked more than 100 cigarettes in their lifetime. 
-Anyone with a FH of AAA 
-Anyone recommended for screening by USPSTF  Not applicable Family Practice Management 2011 When you completed your advance medical directive please bring a copy to the office to add to your medical record. Introducing [facility] & [facility]! Dear [place]: 
Thank you for requesting a [facility] account. Our records indicate that you already have an active [facility] account. You can access your account anytime at [email or web address] Did you know that you can access your hospital and ER discharge instructions at any time in [facility]? You can also review all of your test results from your hospital stay or ER visit. Additional Information If you have questions, please visit the Frequently Asked Questions section of the [facility] website at [email or web address] Remember, [facility] is NOT to be used for urgent needs. For medical emergencies, dial 911. Now available from your iPhone and Android! Please provide this summary of care documentation to your next provider. Your primary care clinician is listed as Brea Lee. If you have any questions after today's visit, please call [phone].

## 2017-06-01 NOTE — PROGRESS NOTES
1. Have you been to the ER, urgent care clinic since your last visit? Hospitalized since your last visit? No    2. Have you seen or consulted any other health care providers outside of the [address] [place] [address] since your last visit? Include any pap smears or colon screening. No     Chief Complaint   Patient presents with    Allergies     sneezing, runny nose, shortness of breath for the past 3 days.  Has been using nasacort and cough syrup OTC     Not fasting

## 2017-07-19 ENCOUNTER — HOSPITAL ENCOUNTER (OUTPATIENT)
Dept: MAMMOGRAPHY | Age: 82
Discharge: HOME | End: 2017-07-19
Attending: INTERNAL MEDICINE
Payer: MEDICARE

## 2017-07-19 DIAGNOSIS — Z12.31 VISIT FOR SCREENING MAMMOGRAM: ICD-10-CM

## 2017-07-19 PROCEDURE — 77067 SCR MAMMO BI INCL CAD: CPT

## 2017-07-26 ENCOUNTER — OFFICE VISIT (OUTPATIENT)
Dept: CARDIOLOGY CLINIC | Age: 82
End: 2017-07-26

## 2017-07-26 ENCOUNTER — CLINICAL SUPPORT (OUTPATIENT)
Dept: CARDIOLOGY CLINIC | Age: 82
End: 2017-07-26

## 2017-07-26 ENCOUNTER — HOSPITAL ENCOUNTER (OUTPATIENT)
Dept: LAB | Age: 82
Discharge: HOME | End: 2017-07-26
Payer: MEDICARE

## 2017-07-26 VITALS
OXYGEN SATURATION: 96 % | RESPIRATION RATE: 16 BRPM | WEIGHT: 211 LBS | BODY MASS INDEX: 35.16 KG/M2 | HEIGHT: 65 IN | DIASTOLIC BLOOD PRESSURE: 78 MMHG | SYSTOLIC BLOOD PRESSURE: 134 MMHG | HEART RATE: 65 BPM

## 2017-07-26 DIAGNOSIS — Z01.812 PRE-PROCEDURE LAB EXAM: ICD-10-CM

## 2017-07-26 DIAGNOSIS — I44.2 ATRIOVENTRICULAR BLOCK, COMPLETE (HCC): ICD-10-CM

## 2017-07-26 DIAGNOSIS — Z95.0 CARDIAC PACEMAKER IN SITU: Primary | ICD-10-CM

## 2017-07-26 DIAGNOSIS — Z95.0 CARDIAC PACEMAKER IN SITU: ICD-10-CM

## 2017-07-26 DIAGNOSIS — Z45.018 PACEMAKER END OF LIFE: Primary | ICD-10-CM

## 2017-07-26 PROCEDURE — 80048 BASIC METABOLIC PNL TOTAL CA: CPT

## 2017-07-26 PROCEDURE — 36415 COLL VENOUS BLD VENIPUNCTURE: CPT

## 2017-07-26 PROCEDURE — 85025 COMPLETE CBC W/AUTO DIFF WBC: CPT

## 2017-07-26 RX ORDER — CHLORHEXIDINE GLUCONATE 4 G/100ML
SOLUTION TOPICAL
Qty: 1 BOTTLE | Refills: 0 | Status: SHIPPED | OUTPATIENT
Start: 2017-07-26 | End: 2017-08-18

## 2017-07-26 NOTE — PATIENT INSTRUCTIONS
Your Pacer gen change procedure has been scheduled for 8/18/17 at 7:30am, at [facility].    Please report to Admitting Department by 6:15am, or 2 hours prior to your scheduled procedure. Please bring a list of your current medications and medication bottles, if able, to the hospital on this day. You will be unable to drive after your procedure so please make sure to bring someone with you to your procedure. You will need to have nothing to eat or drink after midnight, the night prior to your procedure. You may have small sips of water, if needed, to take with your medication. You will need labs drawn prior to your procedure. Please go to [facility] to have this done as soon as your able to. You should not stop your medications prior to your scheduled procedure. After your procedure, you will need to follow up with Dr. Leon Osman. Your follow-up appointment has been scheduled for 9/7/17 at 10:15am.     Hibiclens 4% topical solution has been ordered and sent into your pharmacy  Patient it start Hibiclens application 5 days prior to procedure date    Directions Hibiclens 4%: Start cleanse 5 days prior to procedure   1. Rinse area (upper chest and upper arms) with water. 2. Apply minimum amount necessary to scrub the upper chest area from shoulder/neck to mid line of chest and to below the nipple each of  5 nights before the day of the procedure  3. Let solution dry.

## 2017-07-26 NOTE — PROGRESS NOTES
Jessica Cuadra is a 80 y.o. female  Chief Complaint   Patient presents with    Pacemaker Check     1. Have you been to the ER, urgent care clinic since your last visit? Hospitalized since your last visit? No    2. Have you seen or consulted any other health care providers outside of the [address] [place] [address] since your last visit? Include any pap smears or colon screening.   No

## 2017-07-26 NOTE — MR AVS SNAPSHOT
Visit Information Date & Time Provider Department Dept. Phone Encounter #  
 7/26/2017 11:20 AM Raheem Quezada MD [facility] Bass [phone] [id number] Your Appointments 9/7/2017 10:15 AM  
PACEMAKER with [id number] [facility] (YULI SCHEDULING) Appt Note: 2 week PPM gen change [address] [address]  
[address] [place] [address]  
  
    
 9/7/2017 10:20 AM  
ESTABLISHED PATIENT with Raheem Quezada MD  
[facility] ([address] [place] [address] Road) Appt Note: 2 week PPM gen change [address] [place]  
[address] [place] [address]  
  
    
 9/13/2017 10:15 AM  
Office Visit with GATITO Stone [facility] ([address] [place] [address]) Appt Note: [id number] [facility], [address] [facility] [address]  
[address], [place] [address]  
  
    
 9/13/2017 11:40 AM  
ROUTINE CARE with MD Hilda Cha [facility] ([address] [place] [address]) Appt Note: 4 mths fup; 4 mths fup; r/s  
 [address], [address] [place] [address]  
[address], [address] [facility] [address] Upcoming Health Maintenance Date Due  
 EYE EXAM RETINAL OR DILATED Q1 5/12/2017 LIPID PANEL Q1 8/16/2017 MICROALBUMIN Q1 8/1/2017* FOOT EXAM Q1 8/3/2017* INFLUENZA AGE 9 TO ADULT 8/1/2017 HEMOGLOBIN A1C Q6M 8/28/2017 GLAUCOMA SCREENING Q2Y 5/12/2018 MEDICARE YEARLY EXAM 6/2/2018 DTaP/Tdap/Td series (3 - Td) 11/1/2025 *Topic was postponed. The date shown is not the original due date. Allergies as of 7/26/2017  Review Complete On: 7/26/2017 By: Haris Chanel LPN Severity Noted Reaction Type Reactions Ciprofloxacin  03/01/2012   Side Effect Other (comments) \"jittery\" Lipitor [Atorvastatin]  03/01/2012   Side Effect Myalgia Mevacor [Lovastatin]  03/01/2012   Side Effect Other (comments) dyspepsia Pcn [Penicillins]  04/04/2011   Side Effect Hives Zocor [Simvastatin]  03/01/2012   Side Effect Myalgia Current Immunizations  Reviewed on 2/28/2017 Name Date Influenza Vaccine 12/11/2015, 10/1/2014, 11/1/2012 Pneumococcal Conjugate (PCV-13) 11/15/2016 TDAP Vaccine 10/1/2006 Tdap 11/1/2015 ZZZ-RETIRED (DO NOT USE) Pneumococcal Vaccine (Unspecified Type) 1/1/2005 Zoster Vaccine, Live 11/1/2012 Not reviewed this visit You Were Diagnosed With   
  
 Codes Comments Atrioventricular block, complete (HCC)    -  Primary ICD-10-CM: O44.1 ICD-9-CM: 426.0 Pre-procedure lab exam     ICD-10-CM: J93.618 ICD-9-CM: V72.63 Vitals BP Pulse Resp Height(growth percentile) Weight(growth percentile) LMP  
 134/78 (BP 1 Location: Left arm, BP Patient Position: Sitting) 65 16 5' 5\" (1.651 m) 211 lb (95.7 kg) (LMP Unknown) SpO2 BMI OB Status Smoking Status 96% 35.11 kg/m2 Hysterectomy Former Smoker Vitals History BMI and BSA Data Body Mass Index Body Surface Area  
 35.11 kg/m 2 2.09 m 2 Preferred Pharmacy Pharmacy Name Phone [facility] [address] [facility] [place], [address] [place] [address] RDS. [phone] Your Updated Medication List  
  
   
This list is accurate as of: 7/26/17 12:04 PM.  Always use your most recent med list. ADVIL 200 mg tablet Generic drug:  ibuprofen Take 400 mg by mouth daily. albuterol 90 mcg/actuation inhaler Commonly known as:  PROVENTIL HFA, VENTOLIN HFA, PROAIR HFA Take 2 Puffs by inhalation every six (6) hours as needed for Wheezing (or chest tightness). amLODIPine 2.5 mg tablet Commonly known as:  Akuacarlie Cisneros TAKE ONE TABLET BY MOUTH DAILY  
  
 benazepril 10 mg tablet Commonly known as:  LOTENSIN  
TAKE ONE TABLET BY MOUTH DAILY **GENERIC LOTENSIN**  
  
 chlorhexidine 4 % liquid Commonly known as:  HIBICLENS Apply to the upper chest area from shoulder/neck to mid line of chest and to below the nipple every day, 5 days prior to the procedure. co-enzyme Q-10 100 mg capsule Commonly known as:  CO Q-10 Take 1 Cap by mouth daily. ezetimibe 10 mg tablet Commonly known as:  Berle Poncho Take 1 Tab by mouth daily. NASACORT NA  
by Nasal route as needed. ZyrTEC 5 mg tablet Generic drug:  cetirizine Take 10 mg by mouth daily. Prescriptions Sent to Pharmacy Refills  
 chlorhexidine (HIBICLENS) 4 % liquid 0 Sig: Apply to the upper chest area from shoulder/neck to mid line of chest and to below the nipple every day, 5 days prior to the procedure. Class: Normal  
 Pharmacy: [facility] [address] [place] [address].  #: [phone] We Performed the Following CBC WITH AUTOMATED DIFF [61305 CPT(R)] METABOLIC PANEL, BASIC [02352 CPT(R)] Patient Instructions Your Pacer gen change procedure has been scheduled for 8/18/17 at 7:30am, at [facility]. 
 
Please report to Admitting Department by 6:15am, or 2 hours prior to your scheduled procedure. Please bring a list of your current medications and medication bottles, if able, to the hospital on this day. You will be unable to drive after your procedure so please make sure to bring someone with you to your procedure. You will need to have nothing to eat or drink after midnight, the night prior to your procedure. You may have small sips of water, if needed, to take with your medication. You will need labs drawn prior to your procedure. Please go to [facility] to have this done as soon as your able to. You should not stop your medications prior to your scheduled procedure. After your procedure, you will need to follow up with Dr. Carlota Nguyen. Your follow-up appointment has been scheduled for 9/7/17 at 10:15am.  
 
Hibiclens 4% topical solution has been ordered and sent into your pharmacy Patient it start Hibiclens application 5 days prior to procedure date Directions Hibiclens 4%: Start cleanse 5 days prior to procedure 1. Rinse area (upper chest and upper arms) with water. 2. Apply minimum amount necessary to scrub the upper chest area from shoulder/neck to mid line of chest and to below the nipple each of  5 nights before the day of the procedure 3. Let solution dry. Introducing [facility] & [facility]! Dear [place]: 
Thank you for requesting a [facility] account. Our records indicate that you already have an active [facility] account. You can access your account anytime at [email or web address] Did you know that you can access your hospital and ER discharge instructions at any time in [facility]? You can also review all of your test results from your hospital stay or ER visit. Additional Information If you have questions, please visit the Frequently Asked Questions section of the [facility] website at [email or web address] Remember, [facility] is NOT to be used for urgent needs. For medical emergencies, dial 911. Now available from your iPhone and Android! Please provide this summary of care documentation to your next provider. Your primary care clinician is listed as Brea Lee. If you have any questions after today's visit, please call [phone].

## 2017-07-26 NOTE — PROGRESS NOTES
Cardiac Electrophysiology Office Consultation Note     Subjective:      Katelyn Camilo is a 80 y.o. patient who is seen for evaluation of dual chamber pacemaker. It is Medtronic and has reached end of life. The patient is pacemaker dependent. JAMAL has been since May. The patient has an escaped rhythm about 50 beats per minute. She is aware because of the symptoms of fatigue. The pacemaker lead is from [address] and RV pacing impedance is slightly above 1000 ohms. Pacing threshold is normal.         Patient Active Problem List   Diagnosis Code    Atrioventricular block, complete ([facility] I44.2     hyperlipidemia E78.5    Hypertension, benign I10    Cardiac pacemaker in situ Z95.0    S/P FERNANDO-BSO Z90.710, Z90.722, Z90.79    Allergy to environmental factors Z91.09    Rotator cuff tear, right M75.101    S/P right cataract extraction Z98.41    History of screening mammography Z92.89    Colon cancer screening Z12.11    Diabetes type 2, controlled ([facility] E11.9    Advance directive discussed with patient Z71.89    Diabetic eye exam ([facility] E11.9, Z01.00     Current Outpatient Prescriptions   Medication Sig Dispense Refill    chlorhexidine (HIBICLENS) 4 % liquid Apply to the upper chest area from shoulder/neck to mid line of chest and to below the nipple every day, 5 days prior to the procedure. 1 Bottle 0    albuterol (PROVENTIL HFA, VENTOLIN HFA, PROAIR HFA) 90 mcg/actuation inhaler Take 2 Puffs by inhalation every six (6) hours as needed for Wheezing (or chest tightness). 1 Inhaler 0    amLODIPine (NORVASC) 2.5 mg tablet TAKE ONE TABLET BY MOUTH DAILY 90 Tab 0    benazepril (LOTENSIN) 10 mg tablet TAKE ONE TABLET BY MOUTH DAILY **GENERIC LOTENSIN** 90 Tab 0    TRIAMCINOLONE ACETONIDE (NASACORT NA) by Nasal route as needed.  ezetimibe (ZETIA) 10 mg tablet Take 1 Tab by mouth daily. 90 Tab 3    co-enzyme Q-10 (CO Q-10) 100 mg capsule Take 1 Cap by mouth daily.  30 Cap 5    ibuprofen (ADVIL) 200 mg tablet Take 400 mg by mouth daily.  cetirizine (ZYRTEC) 5 mg tablet Take 10 mg by mouth daily. 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  Menopause     LMP-?    Rotator cuff tear 8/2011    right    S/P FERNANDO-BSO 3/2/2012     Past Surgical History:   Procedure Laterality Date    CARDIAC SURG PROCEDURE UNLIST  03/08    Pacemaker Battery Replacement    CARDIAC SURG PROCEDURE UNLIST  1996    Pacemaker Implant    CHEST SURGERY PROCEDURE UNLISTED      Pacemaker    ENDOSCOPY, COLON, DIAGNOSTIC  11/18/08    polyp (Brand)    HX HEENT  6/7/10    ELIAZAR O.D.    HX ORTHOPAEDIC  1992    R Elbow Fx - ORIF    HX FERNANDO AND BSO  1975     Family History   Problem Relation Age of Onset    Cancer Mother     Stroke Father      Social History   Substance Use Topics    Smoking status: Former Smoker     Quit date: 4/8/2007    Smokeless tobacco: Never Used    Alcohol use 3.5 - 5.0 oz/week     7 - 10 Standard drinks or equivalent per week      Comment: pt states she drinks vodka and burbon everyday        Review of Systems:   Constitutional: Negative for fever, chills, weight loss, + malaise/fatigue. HEENT: Negative for nosebleeds, vision changes. Respiratory: Negative for cough, hemoptysis  Cardiovascular: Negative for chest pain, palpitations, orthopnea, claudication, leg swelling, syncope, and PND.    Gastrointestinal: Negative for nausea, vomiting, diarrhea, blood in stool and melena. Genitourinary: Negative for dysuria, and hematuria. Musculoskeletal: Negative for myalgias, + arthralgia. Skin: Negative for rash. Heme: Does not bleed or bruise easily. Neurological: Negative for speech change and focal weakness     Objective:     Visit Vitals    /78 (BP 1 Location: Left arm, BP Patient Position: Sitting)    Pulse 65    Resp 16    Ht 5' 5\" (1.651 m)    Wt 211 lb (95.7 kg)    LMP  (LMP Unknown)    SpO2 96%    BMI 35.11 kg/m2      Physical Exam:   Constitutional: well-developed and well-nourished. No respiratory distress. Head: Normocephalic and atraumatic. Eyes: Pupils are equal, round  ENT: hearing normal  Neck: supple. No JVD present. Cardiovascular: Normal rate, regular rhythm. Exam reveals no gallop and no friction rub. No murmur heard. Pulmonary/Chest: Effort normal and breath sounds normal. No wheezes. Abdominal: Soft, no tenderness. + obesity  Musculoskeletal: no edema. Neurological: alert,oriented. Skin: Skin is warm and dry right sided pacer with tight pocket and lateral edge pushed to the skin  Psychiatric: normal mood and affect. Behavior is normal. Judgment and thought content normal.         Assessment/Plan:       ICD-10-CM ICD-9-CM    1. Pacemaker end of life Z45.018 V53.31    2. Atrioventricular block, complete (HCC) I82.2 095.0 METABOLIC PANEL, BASIC      CBC WITH AUTOMATED DIFF   3. Pre-procedure lab exam O94.232 L61.49 METABOLIC PANEL, BASIC      CBC WITH AUTOMATED DIFF   4. Cardiac pacemaker in situ Z95.0 V45.01       The patient's pacemaker needs to be replaced. She wants to proceed. She also wants a pocket revision. I discussed with the patient the risks and benefits. She has the RV pacing impedance over 1000 but the last pacemaker generator lasted for about nine years. I think it may be okay to continue to use with the two pacemaker leads that she has and not to implant a new lead.   If she is totally dependent and has noted an escaped rhythm on the day of the pacemaker generator change, I recommended temporary pacing catheter    Follow-up Disposition:  Return in about 2 weeks (around 8/9/2017). Thank you for involving me in this patient's care and please call with further concerns or questions. Enmanuel Johnston M.D.   Electrophysiology/Cardiology  [facility] [place]  [address] [facility], [facility] 91  [address] [facility], [address] [place] [address]  [phone]

## 2017-07-27 LAB
BASOPHILS # BLD AUTO: 0 X10E3/UL (ref 0–0.2)
BASOPHILS NFR BLD AUTO: 1 %
BUN SERPL-MCNC: 26 MG/DL (ref 8–27)
BUN/CREAT SERPL: 29 (ref 12–28)
CALCIUM SERPL-MCNC: 10.2 MG/DL (ref 8.7–10.3)
CHLORIDE SERPL-SCNC: 102 MMOL/L (ref 96–106)
CO2 SERPL-SCNC: 25 MMOL/L (ref 18–29)
CREAT SERPL-MCNC: 0.9 MG/DL (ref 0.57–1)
EOSINOPHIL # BLD AUTO: 0.1 X10E3/UL (ref 0–0.4)
EOSINOPHIL NFR BLD AUTO: 1 %
ERYTHROCYTE [DISTWIDTH] IN BLOOD BY AUTOMATED COUNT: 13.7 % (ref 12.3–15.4)
GLUCOSE SERPL-MCNC: 117 MG/DL (ref 65–99)
HCT VFR BLD AUTO: 45.3 % (ref 34–46.6)
HGB BLD-MCNC: 14.7 G/DL (ref 11.1–15.9)
IMM GRANULOCYTES # BLD: 0 X10E3/UL (ref 0–0.1)
IMM GRANULOCYTES NFR BLD: 0 %
INTERPRETATION: NORMAL
LYMPHOCYTES # BLD AUTO: 2 X10E3/UL (ref 0.7–3.1)
LYMPHOCYTES NFR BLD AUTO: 28 %
MCH RBC QN AUTO: 30.2 PG (ref 26.6–33)
MCHC RBC AUTO-ENTMCNC: 32.5 G/DL (ref 31.5–35.7)
MCV RBC AUTO: 93 FL (ref 79–97)
MONOCYTES # BLD AUTO: 0.7 X10E3/UL (ref 0.1–0.9)
MONOCYTES NFR BLD AUTO: 10 %
NEUTROPHILS # BLD AUTO: 4.3 X10E3/UL (ref 1.4–7)
NEUTROPHILS NFR BLD AUTO: 60 %
PLATELET # BLD AUTO: 184 X10E3/UL (ref 150–379)
POTASSIUM SERPL-SCNC: 4.9 MMOL/L (ref 3.5–5.2)
RBC # BLD AUTO: 4.86 X10E6/UL (ref 3.77–5.28)
SODIUM SERPL-SCNC: 143 MMOL/L (ref 134–144)
WBC # BLD AUTO: 7.1 X10E3/UL (ref 3.4–10.8)

## 2017-08-09 RX ORDER — SODIUM CHLORIDE 0.9 % (FLUSH) 0.9 %
5-10 SYRINGE (ML) INJECTION EVERY 8 HOURS
Status: CANCELLED | OUTPATIENT
Start: 2017-08-09

## 2017-08-09 RX ORDER — SODIUM CHLORIDE 0.9 % (FLUSH) 0.9 %
5-10 SYRINGE (ML) INJECTION AS NEEDED
Status: CANCELLED | OUTPATIENT
Start: 2017-08-09

## 2017-08-18 ENCOUNTER — HOSPITAL ENCOUNTER (OUTPATIENT)
Dept: NON INVASIVE DIAGNOSTICS | Age: 82
Discharge: HOME | End: 2017-08-18
Attending: INTERNAL MEDICINE | Admitting: INTERNAL MEDICINE
Payer: MEDICARE

## 2017-08-18 VITALS
DIASTOLIC BLOOD PRESSURE: 63 MMHG | OXYGEN SATURATION: 93 % | HEART RATE: 79 BPM | RESPIRATION RATE: 20 BRPM | TEMPERATURE: 97.4 F | HEIGHT: 67 IN | WEIGHT: 211.6 LBS | BODY MASS INDEX: 33.21 KG/M2 | SYSTOLIC BLOOD PRESSURE: 138 MMHG

## 2017-08-18 PROCEDURE — 33228 REMV&REPLC PM GEN DUAL LEAD: CPT

## 2017-08-18 PROCEDURE — 74011000272 HC RX REV CODE- 272: Performed by: INTERNAL MEDICINE

## 2017-08-18 PROCEDURE — 74011250636 HC RX REV CODE- 250/636: Performed by: INTERNAL MEDICINE

## 2017-08-18 PROCEDURE — C1785 PMKR, DUAL, RATE-RESP: HCPCS

## 2017-08-18 PROCEDURE — 77030018729 HC ELECTRD DEFIB PAD CARD -B

## 2017-08-18 PROCEDURE — 77030012935 HC DRSG AQUACEL BMS -B

## 2017-08-18 PROCEDURE — 74011000250 HC RX REV CODE- 250: Performed by: INTERNAL MEDICINE

## 2017-08-18 PROCEDURE — 77030018547 HC SUT ETHBND1 J&J -B

## 2017-08-18 PROCEDURE — 77030011640 HC PAD GRND REM COVD -A

## 2017-08-18 PROCEDURE — 77030028698 HC BLD TISS PLSM MEDT -D

## 2017-08-18 PROCEDURE — 77030031139 HC SUT VCRL2 J&J -A

## 2017-08-18 RX ORDER — NALOXONE HYDROCHLORIDE 0.4 MG/ML
0.4 INJECTION, SOLUTION INTRAMUSCULAR; INTRAVENOUS; SUBCUTANEOUS AS NEEDED
Status: DISCONTINUED | OUTPATIENT
Start: 2017-08-18 | End: 2017-08-18 | Stop reason: HOSPADM

## 2017-08-18 RX ORDER — SODIUM CHLORIDE 0.9 % (FLUSH) 0.9 %
5-10 SYRINGE (ML) INJECTION EVERY 8 HOURS
Status: DISCONTINUED | OUTPATIENT
Start: 2017-08-18 | End: 2017-08-18 | Stop reason: HOSPADM

## 2017-08-18 RX ORDER — FENTANYL CITRATE 50 UG/ML
25-200 INJECTION, SOLUTION INTRAMUSCULAR; INTRAVENOUS
Status: DISCONTINUED | OUTPATIENT
Start: 2017-08-18 | End: 2017-08-18

## 2017-08-18 RX ORDER — SODIUM CHLORIDE 0.9 % (FLUSH) 0.9 %
5-10 SYRINGE (ML) INJECTION AS NEEDED
Status: DISCONTINUED | OUTPATIENT
Start: 2017-08-18 | End: 2017-08-18

## 2017-08-18 RX ORDER — CLINDAMYCIN HYDROCHLORIDE 300 MG/1
300 CAPSULE ORAL 3 TIMES DAILY
Qty: 15 CAP | Refills: 0 | Status: SHIPPED | OUTPATIENT
Start: 2017-08-18 | End: 2017-09-07 | Stop reason: ALTCHOICE

## 2017-08-18 RX ORDER — SODIUM CHLORIDE 9 MG/ML
500 INJECTION, SOLUTION INTRAVENOUS ONCE
Status: COMPLETED | OUTPATIENT
Start: 2017-08-18 | End: 2017-08-18

## 2017-08-18 RX ORDER — MIDAZOLAM HYDROCHLORIDE 1 MG/ML
.5-1 INJECTION, SOLUTION INTRAMUSCULAR; INTRAVENOUS
Status: DISCONTINUED | OUTPATIENT
Start: 2017-08-18 | End: 2017-08-18

## 2017-08-18 RX ORDER — VANCOMYCIN 2 GRAM/500 ML IN 0.9 % SODIUM CHLORIDE INTRAVENOUS
2000 ONCE
Status: COMPLETED | OUTPATIENT
Start: 2017-08-18 | End: 2017-08-18

## 2017-08-18 RX ORDER — ATROPINE SULFATE 0.1 MG/ML
1 INJECTION INTRAVENOUS AS NEEDED
Status: DISCONTINUED | OUTPATIENT
Start: 2017-08-18 | End: 2017-08-18

## 2017-08-18 RX ORDER — LIDOCAINE HYDROCHLORIDE 10 MG/ML
10-40 INJECTION INFILTRATION; PERINEURAL AS NEEDED
Status: DISCONTINUED | OUTPATIENT
Start: 2017-08-18 | End: 2017-08-18

## 2017-08-18 RX ORDER — VANCOMYCIN HYDROCHLORIDE 1 G/20ML
1 INJECTION, POWDER, LYOPHILIZED, FOR SOLUTION INTRAVENOUS ONCE
Status: COMPLETED | OUTPATIENT
Start: 2017-08-18 | End: 2017-08-18

## 2017-08-18 RX ORDER — HYDROCODONE BITARTRATE AND ACETAMINOPHEN 5; 325 MG/1; MG/1
1 TABLET ORAL
Qty: 15 TAB | Refills: 0 | Status: SHIPPED | OUTPATIENT
Start: 2017-08-18 | End: 2017-09-13 | Stop reason: ALTCHOICE

## 2017-08-18 RX ORDER — SODIUM CHLORIDE 0.9 % (FLUSH) 0.9 %
5-10 SYRINGE (ML) INJECTION AS NEEDED
Status: DISCONTINUED | OUTPATIENT
Start: 2017-08-18 | End: 2017-08-18 | Stop reason: HOSPADM

## 2017-08-18 RX ADMIN — Medication 10 ML: at 08:32

## 2017-08-18 RX ADMIN — SODIUM CHLORIDE 500 ML: 900 INJECTION, SOLUTION INTRAVENOUS at 07:53

## 2017-08-18 RX ADMIN — MIDAZOLAM HYDROCHLORIDE 1 MG: 1 INJECTION, SOLUTION INTRAMUSCULAR; INTRAVENOUS at 07:48

## 2017-08-18 RX ADMIN — SODIUM CHLORIDE: 900 IRRIGANT IRRIGATION at 08:31

## 2017-08-18 RX ADMIN — FENTANYL CITRATE 25 MCG: 50 INJECTION, SOLUTION INTRAMUSCULAR; INTRAVENOUS at 08:31

## 2017-08-18 RX ADMIN — MIDAZOLAM HYDROCHLORIDE 1 MG: 1 INJECTION, SOLUTION INTRAMUSCULAR; INTRAVENOUS at 08:01

## 2017-08-18 RX ADMIN — VANCOMYCIN HYDROCHLORIDE 1000 MG: 1 INJECTION, POWDER, LYOPHILIZED, FOR SOLUTION INTRAVENOUS at 08:33

## 2017-08-18 RX ADMIN — FENTANYL CITRATE 25 MCG: 50 INJECTION, SOLUTION INTRAMUSCULAR; INTRAVENOUS at 07:48

## 2017-08-18 RX ADMIN — VANCOMYCIN HYDROCHLORIDE 2000 MG: 10 INJECTION, POWDER, LYOPHILIZED, FOR SOLUTION INTRAVENOUS at 07:41

## 2017-08-18 RX ADMIN — LIDOCAINE HYDROCHLORIDE 30 ML: 10 INJECTION, SOLUTION INFILTRATION; PERINEURAL at 08:22

## 2017-08-18 RX ADMIN — MIDAZOLAM HYDROCHLORIDE 2 MG: 1 INJECTION, SOLUTION INTRAMUSCULAR; INTRAVENOUS at 08:22

## 2017-08-18 RX ADMIN — MIDAZOLAM HYDROCHLORIDE 1 MG: 1 INJECTION, SOLUTION INTRAMUSCULAR; INTRAVENOUS at 08:31

## 2017-08-18 RX ADMIN — FENTANYL CITRATE 25 MCG: 50 INJECTION, SOLUTION INTRAMUSCULAR; INTRAVENOUS at 08:22

## 2017-08-18 RX ADMIN — FENTANYL CITRATE 25 MCG: 50 INJECTION, SOLUTION INTRAMUSCULAR; INTRAVENOUS at 08:02

## 2017-08-18 NOTE — PROGRESS NOTES
Cardiac Cath Lab Recovery Arrival Note:      [place] arrived to Cardiac Cath Lab, Recovery Area. Staff introduced to patient. Patient identifiers verified with NAME and DATE OF BIRTH. Procedure verified with patient. Consent forms reviewed and signed by patient or authorized representative and verified. Allergies verified. Patient informed of procedure and plan of care. Questions answered with review. Patient prepped for procedure, per orders from physician, prior to arrival.    Patient on cardiac monitor, non-invasive blood pressure, SPO2 monitor. Patient is A&Ox 4. Patient reports no complaints except nervousness. Patient in stretcher, in low position, with side rails up, call bell within reach, patient instructed to call of assistance as needed. Patient prep in: [facility] Recovery Area, Bed# 10. Family in: present. Prep by: JAYESH Daniels

## 2017-08-18 NOTE — DISCHARGE INSTRUCTIONS
PATIENT INSTRUCTIONS POST-PACEMAKER CHANGE    DO NOT TAKE IBUPROFEN for 3 days to reduce risk of bleeding    1. Remove aquacel dressing in a week. Your incision will have skin glue covering the incision, the skin glue will help to reinforce the incision to prevent it from opening, please DO NOT attempt to peel the glue off of the incision. You do have sutures on the inside of the incision that are not visible. Please DO NOT try to scrub the skin glue off once you are able to take a shower. The skin glue will eventually fall off       2. Call Dr. Leela Blancas at [phone] if you experience any of the following symptoms:  1. Redness at the pacemaker site  2. Swelling at or around the pacemaker or in the left arm  3. Pain around the pacemaker  4. Dizziness, lightheadedness, fainting spells  5. Lack of energy  6. Shortness of breath  7. Rapid heart rate  8. Chest or muscle twitches    3. Follow-up with Dr. Leela Blancas as scheduled  Future Appointments  Date Time Provider Caren Cotto   9/7/2017 10:15 AM PACEMAKER3, [address]   9/7/2017 10:20 AM Tae Fuller  [address]   9/13/2017 10:15 AM GATITO Fitzgerald SCHED   9/13/2017 11:40 AM MD DIANE Godfrey SCHED         4. You may use pain medication and ice pack for pain relief as needed. Sheba Smallwood M.D.  [facility]  Electrophysiology/Cardiology  [facility] [place]  [address] [facility], [facility] 91  [address] [facility], [address] [place] [address]  [phone]

## 2017-08-18 NOTE — PROGRESS NOTES
80:  TRANSFER - IN REPORT:    Verbal report received from [facility] on [address] , from the Cardiac Cath lab, for routine progression of care. Report consisted of patients Situation, Background, Assessment and Recommendations(SBAR). Information from the following report(s) Procedure Summary, Intake/Output, MAR and Recent Results was reviewed with the receiving clinician. Opportunity for questions and clarification was provided. Assessment completed upon patients arrival to [address] [facility] and care assumed. Cardiac Cath Lab Recovery Arrival Note:     [address] arrived to [facility] recovery area. Patient procedure= Generator change of permanent pacemaker. Patient on cardiac monitor, non-invasive blood pressure, Patient status doing well without problems. Patient is A&Ox 4. Patient reports no complaints. Procedure site without any bleeding and no hematoma. Aquacel dressing placed. Ice pack placed to right chest.    1100:  [address] ambulated @ 1100 (time) approximately 100 feet. Patient tolerated ambulation without adverse advents. right incision (right/left, groin/arm)  without bleeding or hematoma noted. I have reviewed discharge instructions with the patient and patient's daughter. The patient and patient's daughter verbalized understanding.

## 2017-08-18 NOTE — INTERVAL H&P NOTE
H&P Update:  Jessica Cuadra was seen and examined. History and physical has been reviewed. The patient has been examined.  There have been no significant clinical changes since the completion of the originally dated History and Physical.    Signed By: Steph Kwan MD     August 18, 2017 7:09 AM

## 2017-08-18 NOTE — IP AVS SNAPSHOT
[address] 
[phone] Patient: Rachel Christiansen MRN: [id number] Current Discharge Medication List  
  
START taking these medications Dose & Instructions Dispensing Information Comments Morning Noon Evening Bedtime  
 clindamycin 300 mg capsule Commonly known as:  CLEOCIN Your last dose was: Your next dose is:    
   
   
 Dose:  300 mg Take 1 Cap by mouth three (3) times daily. Quantity:  15 Cap Refills:  0 HYDROcodone-acetaminophen 5-325 mg per tablet Commonly known as:  Olesya Boyce Your last dose was: Your next dose is:    
   
   
 Dose:  1 Tab Take 1 Tab by mouth every six (6) hours as needed for Pain. Max Daily Amount: 4 Tabs. Quantity:  15 Tab Refills:  0 CONTINUE these medications which have NOT CHANGED Dose & Instructions Dispensing Information Comments Morning Noon Evening Bedtime  
 amLODIPine 2.5 mg tablet Commonly known as:  Peg Grebe Your last dose was: Your next dose is: TAKE ONE TABLET BY MOUTH DAILY Quantity:  90 Tab Refills:  1  
     
   
   
   
  
 benazepril 10 mg tablet Commonly known as:  LOTENSIN Your last dose was: Your next dose is: TAKE ONE TABLET BY MOUTH DAILY**GENERIC LOTENSIN** Quantity:  90 Tab Refills:  1 co-enzyme Q-10 100 mg capsule Commonly known as:  CO Q-10 Your last dose was: Your next dose is:    
   
   
 Dose:  100 mg Take 1 Cap by mouth daily. Quantity:  30 Cap Refills:  5  
     
   
   
   
  
 ezetimibe 10 mg tablet Commonly known as:  Arleta Me Your last dose was: Your next dose is:    
   
   
 Dose:  10 mg Take 1 Tab by mouth daily. Quantity:  90 Tab Refills:  3 NASACORT NA Your last dose was: Your next dose is:    
   
   
 by Nasal route as needed. Refills:  0 ZyrTEC 5 mg tablet Generic drug:  cetirizine Your last dose was: Your next dose is:    
   
   
 Dose:  10 mg Take 10 mg by mouth daily. Refills:  0 STOP taking these medications ADVIL 200 mg tablet Generic drug:  ibuprofen ADVIL PM PO  
   
  
 chlorhexidine 4 % liquid Commonly known as:  HIBICLENS Where to Get Your Medications These medications were sent to [facility] [address] [place] [address] RDS. [address] [place], [place] [id number] Phone:  [phone]  
  clindamycin 300 mg capsule Information on where to get these meds will be given to you by the nurse or doctor. ! Ask your nurse or doctor about these medications HYDROcodone-acetaminophen 5-325 mg per tablet

## 2017-08-18 NOTE — H&P (VIEW-ONLY)
Cardiac Electrophysiology Office Consultation Note     Subjective:      Rose Mccall is a 80 y.o. patient who is seen for evaluation of dual chamber pacemaker. It is Medtronic and has reached end of life. The patient is pacemaker dependent. JAMAL has been since May. The patient has an escaped rhythm about 50 beats per minute. She is aware because of the symptoms of fatigue. The pacemaker lead is from [address] and RV pacing impedance is slightly above 1000 ohms. Pacing threshold is normal.         Patient Active Problem List   Diagnosis Code    Atrioventricular block, complete ([facility] I44.2     hyperlipidemia E78.5    Hypertension, benign I10    Cardiac pacemaker in situ Z95.0    S/P FERNANDO-BSO Z90.710, Z90.722, Z90.79    Allergy to environmental factors Z91.09    Rotator cuff tear, right M75.101    S/P right cataract extraction Z98.41    History of screening mammography Z92.89    Colon cancer screening Z12.11    Diabetes type 2, [facility] ([facility] E11.9    Advance directive discussed with patient Z71.89    Diabetic eye exam ([facility] E11.9, Z01.00     Current Outpatient Prescriptions   Medication Sig Dispense Refill    chlorhexidine (HIBICLENS) 4 % liquid Apply to the upper chest area from shoulder/neck to mid line of chest and to below the nipple every day, 5 days prior to the procedure. 1 Bottle 0    albuterol (PROVENTIL HFA, VENTOLIN HFA, PROAIR HFA) 90 mcg/actuation inhaler Take 2 Puffs by inhalation every six (6) hours as needed for Wheezing (or chest tightness). 1 Inhaler 0    amLODIPine (NORVASC) 2.5 mg tablet TAKE ONE TABLET BY MOUTH DAILY 90 Tab 0    benazepril (LOTENSIN) 10 mg tablet TAKE ONE TABLET BY MOUTH DAILY **GENERIC LOTENSIN** 90 Tab 0    TRIAMCINOLONE ACETONIDE (NASACORT NA) by Nasal route as needed.  ezetimibe (ZETIA) 10 mg tablet Take 1 Tab by mouth daily. 90 Tab 3    co-enzyme Q-10 (CO Q-10) 100 mg capsule Take 1 Cap by mouth daily.  30 Cap 5    ibuprofen (ADVIL) 200 mg tablet Take 400 mg by mouth daily.  cetirizine (ZYRTEC) 5 mg tablet Take 10 mg by mouth daily. 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  Menopause     LMP-?    Rotator cuff tear 8/2011    right    S/P FERNANDO-BSO 3/2/2012     Past Surgical History:   Procedure Laterality Date    CARDIAC SURG PROCEDURE UNLIST  03/08    Pacemaker Battery Replacement    CARDIAC SURG PROCEDURE UNLIST  1996    Pacemaker Implant    CHEST SURGERY PROCEDURE UNLISTED      Pacemaker    ENDOSCOPY, COLON, DIAGNOSTIC  11/18/08    polyp (Brand)    HX HEENT  6/7/10    ELIAZAR O.D.    HX ORTHOPAEDIC  1992    R Elbow Fx - ORIF    HX FERNANDO AND BSO  1975     Family History   Problem Relation Age of Onset    Cancer Mother     Stroke Father      Social History   Substance Use Topics    Smoking status: Former Smoker     Quit date: 4/8/2007    Smokeless tobacco: Never Used    Alcohol use 3.5 - 5.0 oz/week     7 - 10 Standard drinks or equivalent per week      Comment: pt states she drinks vodka and burbon everyday        Review of Systems:   Constitutional: Negative for fever, chills, weight loss, + malaise/fatigue. HEENT: Negative for nosebleeds, vision changes. Respiratory: Negative for cough, hemoptysis  Cardiovascular: Negative for chest pain, palpitations, orthopnea, claudication, leg swelling, syncope, and PND.    Gastrointestinal: Negative for nausea, vomiting, diarrhea, blood in stool and melena. Genitourinary: Negative for dysuria, and hematuria. Musculoskeletal: Negative for myalgias, + arthralgia. Skin: Negative for rash. Heme: Does not bleed or bruise easily. Neurological: Negative for speech change and focal weakness     Objective:     Visit Vitals    /78 (BP 1 Location: Left arm, BP Patient Position: Sitting)    Pulse 65    Resp 16    Ht 5' 5\" (1.651 m)    Wt 211 lb (95.7 kg)    LMP  (LMP Unknown)    SpO2 96%    BMI 35.11 kg/m2      Physical Exam:   Constitutional: well-developed and well-nourished. No respiratory distress. Head: Normocephalic and atraumatic. Eyes: Pupils are equal, round  ENT: hearing normal  Neck: supple. No JVD present. Cardiovascular: Normal rate, regular rhythm. Exam reveals no gallop and no friction rub. No murmur heard. Pulmonary/Chest: Effort normal and breath sounds normal. No wheezes. Abdominal: Soft, no tenderness. + obesity  Musculoskeletal: no edema. Neurological: alert,oriented. Skin: Skin is warm and dry right sided pacer with tight pocket and lateral edge pushed to the skin  Psychiatric: normal mood and affect. Behavior is normal. Judgment and thought content normal.         Assessment/Plan:       ICD-10-CM ICD-9-CM    1. Pacemaker end of life Z45.018 V53.31    2. Atrioventricular block, complete (HCC) T99.3 330.1 METABOLIC PANEL, BASIC      CBC WITH AUTOMATED DIFF   3. Pre-procedure lab exam T40.415 U55.99 METABOLIC PANEL, BASIC      CBC WITH AUTOMATED DIFF   4. Cardiac pacemaker in situ Z95.0 V45.01       The patient's pacemaker needs to be replaced. She wants to proceed. She also wants a pocket revision. I discussed with the patient the risks and benefits. She has the RV pacing impedance over 1000 but the last pacemaker generator lasted for about nine years. I think it may be okay to continue to use with the two pacemaker leads that she has and not to implant a new lead.   If she is totally dependent and has noted an escaped rhythm on the day of the pacemaker generator change, I recommended temporary pacing catheter    Follow-up Disposition:  Return in about 2 weeks (around 8/9/2017). Thank you for involving me in this patient's care and please call with further concerns or questions. Radha Gleason M.D.   Electrophysiology/Cardiology  [facility] [place]  [facility] 84, [facility], [address]  [phone]

## 2017-08-18 NOTE — PROGRESS NOTES
Cardiac Cath Lab Procedure Area Arrival Note:    [address] [facility] to Card[facility] Cath Lab, Procedure Area. Patient identifiers verified with NAME and DATE OF BIRTH. Procedure verified with patient. Consent forms verified. Allergies verified. Patient informed of procedure and plan of care. Questions answered with review. Patient voiced understanding of procedure and plan of care. Patient on cardiac monitor, non-invasive blood pressure, SPO2 monitor. On room air  O2 @ 2 lpm via nasal cannula. IV of NS on pump at 25 ml/hr. Patient status doing well without problems. Patient is A&Ox 4. Patient reports no pain. Patient medicated during procedure with orders obtained and verified by Dr. Jo Jensen. Refer to patients Cardiac Cath Lab PROCEDURE REPORT for vital signs, assessment, status, and response during procedure, printed at end of case. Printed report on chart or scanned into chart.

## 2017-08-18 NOTE — PROCEDURES
Cardiac Procedure Note   Patient: Dwight Aguayo  MRN: [id number]  SSN: [id number]   YOB: 1934 Age: 80 y.o.   Sex: female    Date of Procedure: 8/18/2017   Pre-procedure Diagnosis: dual chamber pacemaker end of life  Third degree av block  Post-procedure Diagnosis: same  Procedure: Permanent Pacemaker generator change  :  Dr. Mallorie Hammonds MD    Assistant(s):  None  Anesthesia: Moderate Sedation   Estimated Blood Loss: Less than 10 mL   Specimens Removed: old Medtronic pacemaker  Findings: slow ventricular escape rhythm  Complications: None   Implants:  Medtronic dual chamber pacemaker  Signed by:  Mallorie Hammonds MD  8/18/2017  9:07 AM

## 2017-08-18 NOTE — PROGRESS NOTES
Transfer to [facility] RR from Procedure Area    Verbal report given to [facility] RN on [address] being transferred to Cardiac Cath Lab RR for routine post - op   Patient is post Pacemaker generator change procedure. Patient stable upon transfer to . Report consisted of patients Situation, Background, Assessment and   Recommendations(SBAR). Information from the following report(s) Procedure Summary, Intake/Output, MAR and Cardiac Rhythm Paced was reviewed with the receiving nurse. Opportunity for questions and clarification was provided. Patient medicated during procedure with orders obtained and verified by Dr. Jhonathan Bustillo. Refer to patient PROCEDURE REPORT for vital signs, assessment, status, and response during procedure.

## 2017-08-18 NOTE — IP AVS SNAPSHOT
[address] 
[phone] Patient: Placido Stone MRN: [id number] VZD:1/83/9529 You are allergic to the following Allergen Reactions Ciprofloxacin Other (comments) \"jittery\" Lipitor (Atorvastatin) Myalgia Mevacor (Lovastatin) Other (comments) dyspepsia Pcn (Penicillins) Hives Zocor (Simvastatin) Myalgia Recent Documentation Height Weight Breastfeeding? BMI OB Status Smoking Status 1.702 m 96 kg No 33.14 kg/m2 Hysterectomy Former Smoker Emergency Contacts Name Discharge Info Relation Home Work Mobile Patito Burden  Child [2] [phone] About your hospitalization You were admitted on:  August 18, 2017 You last received care in the:  [address] [place] [address] You were discharged on:  August 18, 2017 Unit phone number:  [phone] Why you were hospitalized Your primary diagnosis was:  Cardiac Pacemaker In Situ Your diagnoses also included:  Atrioventricular Block, Complete (Hcc), Diabetes Type 2, Controlled (Hcc) Providers Seen During Your Hospitalizations Provider Role Specialty Primary office phone Lillian Penny MD Attending Provider Cardiology [phone] Your Primary Care Physician (PCP) Primary Care Physician Office Phone Office Fax [address] [address] [phone] Follow-up Information Follow up With Details Comments Contact Info Lillian Penny MD On 9/7/2017 [address] Ray [address] [facility] 
[phone] Your Appointments Thursday September 07, 2017 10:15 AM EDT  
PACEMAKER with Jose CROWE [facility] (YULI SCHEDULING) [address] [place] [address]  [address] [place]  
[phone]  Thursday September 07, 2017 10:20 AM EDT  
ESTABLISHED PATIENT with Lillian Penny MD  
 [facility] (YULI SCHEDULING) [address] [facility]  
[phone] Wednesday September 13, 2017 10:15 AM EDT Office Visit with GATITO Harkins [facility] ([address] [place] [address] Road) [address], [address] [facility]  
[phone] Wednesday September 13, 2017 11:40 AM EDT ROUTINE CARE with MD Hilda Herbert [facility] ([address] [place] [address] Road) [address], [address] [facility]  
[phone] Current Discharge Medication List  
  
START taking these medications Dose & Instructions Dispensing Information Comments Morning Noon Evening Bedtime  
 clindamycin 300 mg capsule Commonly known as:  CLEOCIN Your last dose was: Your next dose is:    
   
   
 Dose:  300 mg Take 1 Cap by mouth three (3) times daily. Quantity:  15 Cap Refills:  0 HYDROcodone-acetaminophen 5-325 mg per tablet Commonly known as:  Michelle Velasco Your last dose was: Your next dose is:    
   
   
 Dose:  1 Tab Take 1 Tab by mouth every six (6) hours as needed for Pain. Max Daily Amount: 4 Tabs. Quantity:  15 Tab Refills:  0 CONTINUE these medications which have NOT CHANGED Dose & Instructions Dispensing Information Comments Morning Noon Evening Bedtime  
 amLODIPine 2.5 mg tablet Commonly known as:  Kyle Rings Your last dose was: Your next dose is: TAKE ONE TABLET BY MOUTH DAILY Quantity:  90 Tab Refills:  1  
     
   
   
   
  
 benazepril 10 mg tablet Commonly known as:  LOTENSIN Your last dose was: Your next dose is: TAKE ONE TABLET BY MOUTH DAILY**GENERIC LOTENSIN** Quantity:  90 Tab Refills:  1 co-enzyme Q-10 100 mg capsule Commonly known as:  CO Q-10  
   
 Your last dose was: Your next dose is:    
   
   
 Dose:  100 mg Take 1 Cap by mouth daily. Quantity:  30 Cap Refills:  5  
     
   
   
   
  
 ezetimibe 10 mg tablet Commonly known as:  Kraig Benavides Your last dose was: Your next dose is:    
   
   
 Dose:  10 mg Take 1 Tab by mouth daily. Quantity:  90 Tab Refills:  3 NASACORT NA Your last dose was: Your next dose is:    
   
   
 by Nasal route as needed. Refills:  0 ZyrTEC 5 mg tablet Generic drug:  cetirizine Your last dose was: Your next dose is:    
   
   
 Dose:  10 mg Take 10 mg by mouth daily. Refills:  0 STOP taking these medications ADVIL 200 mg tablet Generic drug:  ibuprofen ADVIL PM PO  
   
  
 chlorhexidine 4 % liquid Commonly known as:  HIBICLENS Where to Get Your Medications These medications were sent to Connie Schroeder [address] [place] [address]. [address] [place] [address] Phone:  [phone]  
  clindamycin 300 mg capsule Information on where to get these meds will be given to you by the nurse or doctor. ! Ask your nurse or doctor about these medications HYDROcodone-acetaminophen 5-325 mg per tablet Discharge Instructions PATIENT INSTRUCTIONS POST-PACEMAKER CHANGE 
 
DO NOT TAKE IBUPROFEN for 3 days to reduce risk of bleeding 1. Remove aquacel dressing in a week. Your incision will have skin glue covering the incision, the skin glue will help to reinforce the incision to prevent it from opening, please DO NOT attempt to peel the glue off of the incision. You do have sutures on the inside of the incision that are not visible. Please DO NOT try to scrub the skin glue off once you are able to take a shower. The skin glue will eventually fall off 2.  Call Dr. Selena Orlando at [phone] if you experience any of the following symptoms: 1. Redness at the pacemaker site 2. Swelling at or around the pacemaker or in the left arm 3. Pain around the pacemaker 4. Dizziness, lightheadedness, fainting spells 5. Lack of energy 6. Shortness of breath 7. Rapid heart rate 8. Chest or muscle twitches 3. Follow-up with Dr. Selena Orlando as scheduled Future Appointments Date Time Provider Caren Kirti 9/7/2017 10:15 AM PACEMAKER3, Carl Adair YULI SCHED  
9/7/2017 10:20 AM Kenyon Figueroa  [address]  
9/13/2017 10:15 AM GATITO Peres YULI SCHED  
9/13/2017 11:40 AM Karen Clement MD AI YULI SCHED  
 
 
 
4. You may use pain medication and ice pack for pain relief as needed. Tania Schroeder M.D. [facility] Electrophysiology/Cardiology [facility] [place] [address], [facility] [address] [place] [address] 
[phone]                                        [phone] Discharge Orders None ACO Transitions of Care Introducing [facility] 50 Renuka Velasco offers a voluntary care coordination program to provide high quality service and care to [facility] fee-for-service beneficiaries. Zay Damon was designed to help you enhance your health and well-being through the following services: ? Transitions of Care  support for individuals who are transitioning from one care setting to another (example: Hospital to home). ? Chronic and Complex Care Coordination  support for individuals and caregivers of those with serious or chronic illnesses or with more than one chronic (ongoing) condition and those who take a number of different medications.   
 
 
If you meet specific medical criteria, a [address] Coordinator may call you directly to coordinate your care with your primary care physician and your other care providers. For questions about the [facility] programs, please, contact your physicians office. For general questions or additional information about Accountable Care Organizations: 
Please visit www.medicare.gov/acos. html or call 1-800-MEDICARE (2-594.751.1873) TTY users should call 3-196.490.1197. Introducing [facility]! Dear [place]: 
Thank you for requesting a [employer] account. Our records indicate that you already have an active [employer] account. You can access your account anytime at [email or web address] Did you know that you can access your hospital and ER discharge instructions at any time in [employer]? You can also review all of your test results from your hospital stay or ER visit. Additional Information If you have questions, please visit the Frequently Asked Questions section of the [employer] website at [email or web address] [employer] [email or web address]/. Remember, [employer] is NOT to be used for urgent needs. For medical emergencies, dial 911. Now available from your iPhone and Android! General Information Please provide this summary of care documentation to your next provider. Patient Signature:  ____________________________________________________________ Date:  ____________________________________________________________  
  
Jaxon Slaughter Provider Signature:  ____________________________________________________________ Date:  ____________________________________________________________

## 2017-08-18 NOTE — PROCEDURES
PERMANENT PACEMAKER GENERATOR CHANGE    DATE OF PROCEDURE: 8/18/2017     PROCEDURE:  Replacement of dual-chamber pacemaker. HISTORY:  This is a 80 y.o. woman with chronic pacemaker that has reached [facility]. 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 The risks and benefits were discussed with the patient and she agreed to proceed. PROCEDURE IN DETAIL:  After the informed written consent had been obtained, the patient was brought to the Electrophysiology Suite where she was prepped and draped in the usual sterile fashion. Conscious sedation was initiated and maintained with intravenous Versed and fentanyl. The #10 blade scalpel was then used to make a 3 cm incision below the old right upper chest scar. The incision was taken down to the capsule layer using Bovie cautery and blunt dissection. Careful dissection of the leads and of the device were performed and the leads were detached from the device and then connected to the new pacemaker generator after the leads were analyzed. The pocket is now irrigated with antibiotic solution and the leads with the generator were inserted inside the pocket. The device was anchored with # 2 Ethibond. Vancomycin powder is placed inside the pocket. The pocket is now closed in three consecutive layers using 2-0 Vicryl sutures in continuous fashion. Derma bunn is now applied over the surgical wound. COMPLICATIONS:  None. ESTIMATED BLOOD LOSS: less than 10 mL.       Explanted pacemaker is Medtronic Adapta [id number], [id number], DOI 3/25/2009    IMPLANTED HARDWARE:  The pacemaker is a Medtronic Adapta  model #[id number], serial # [id number].    ATRIAL LEAD:  Medtronic model # [id number], serial # [id number] DOI 12/18/1996    VENTRICULAR LEAD:  Medtronic model # [id number] and serial # [id number] DOI 12/18/1996    DATA:  The P wave is 4 mv pacing impedance 507 ohms pacing threshold 0.5 V @ 0.4 ms  The ventricular lead had the pacing impedance 858 ohms and pacing threshold 0.5 volts at 0.4 ms. PROGRAMMED PARAMETER:  The device is programmed to DDDR pacing mode with mode switch with the low rate of 60 beats per minute and upper tracking sensor rate of 120 beats per minute.       Patient is pacemaker dependent    ASSESSMENT AND PLAN:  The patient will be discharged home and see me in office 2 weeks

## 2017-09-07 ENCOUNTER — OFFICE VISIT (OUTPATIENT)
Dept: CARDIOLOGY CLINIC | Age: 82
End: 2017-09-07

## 2017-09-07 ENCOUNTER — CLINICAL SUPPORT (OUTPATIENT)
Dept: CARDIOLOGY CLINIC | Age: 82
End: 2017-09-07

## 2017-09-07 DIAGNOSIS — I44.2 ATRIOVENTRICULAR BLOCK, COMPLETE (HCC): ICD-10-CM

## 2017-09-07 DIAGNOSIS — Z95.0 CARDIAC PACEMAKER IN SITU: Primary | ICD-10-CM

## 2017-09-07 NOTE — PROGRESS NOTES
Wound Check   Patient is here for wound check. No fever, drainage   Physical Exam   Constitutional: well-developed and well-nourished. Skin: Left side pocket is healed without redness, drainage, hematoma       ASSESSMENT and PLAN     ICD-10-CM ICD-9-CM    1. Cardiac pacemaker in situ Z95.0 V45.01    2.  Atrioventricular block, complete (HCC) I44.2 426.0      Lateral edge is noticeable but when moving arm it is not tense and no erosion right now  current treatment plan is effective, no change in therapy   Device check today  Follow-up Disposition:  Return 3 months I will check via device clinic or remote monitoring in the future

## 2017-09-07 NOTE — MR AVS SNAPSHOT
Visit Information Date & Time Provider Department Dept. Phone Encounter #  
 9/7/2017 10:20 AM Kathleen Babb MD [facility] [place] [place] [phone] [id number] Follow-up Instructions Return in about 3 months (around 12/7/2017). Your Appointments 9/13/2017 10:15 AM  
Office Visit with GATITO Nuñez [facility] ([address]) Appt Note: [address] [facility], Fela Khurram [address] [facility] [address]  
[address], [address] [place] [address]  
  
    
 9/13/2017 11:40 AM  
ROUTINE CARE with MD Hilda Hanks [facility] ([address] [facility]) Appt Note: 4 mths fup; 4 mths fup; r/s  
 [address] , [address]  
[address], [facility] [place] [id number] Upcoming Health Maintenance Date Due  
 FOOT EXAM Q1 4/13/2017 MICROALBUMIN Q1 4/13/2017 EYE EXAM RETINAL OR DILATED Q1 5/12/2017 INFLUENZA AGE 9 TO ADULT 8/1/2017 LIPID PANEL Q1 8/16/2017 HEMOGLOBIN A1C Q6M 8/28/2017 GLAUCOMA SCREENING Q2Y 5/12/2018 MEDICARE YEARLY EXAM 6/2/2018 DTaP/Tdap/Td series (3 - Td) 11/1/2025 Allergies as of 9/7/2017  Review Complete On: 9/7/2017 By: Kathleen Babb MD  
  
 Severity Noted Reaction Type Reactions Ciprofloxacin  03/01/2012   Side Effect Other (comments) \"jittery\" Lipitor [Atorvastatin]  03/01/2012   Side Effect Myalgia Mevacor [Lovastatin]  03/01/2012   Side Effect Other (comments) dyspepsia Pcn [Penicillins]  04/04/2011   Side Effect Hives Zocor [Simvastatin]  03/01/2012   Side Effect Myalgia Current Immunizations  Reviewed on 2/28/2017 Name Date Influenza Vaccine 12/11/2015, 10/1/2014, 11/1/2012 Pneumococcal Conjugate (PCV-13) 11/15/2016 TDAP Vaccine 10/1/2006 Tdap 11/1/2015 ZZZ-RETIRED (DO NOT USE) Pneumococcal Vaccine (Unspecified Type) 1/1/2005 Zoster Vaccine, Live 11/1/2012 Not reviewed this visit You Were Diagnosed With   
  
 Codes Comments Cardiac pacemaker in situ    -  Primary ICD-10-CM: Z95.0 ICD-9-CM: V45.01 Atrioventricular block, complete (HCC)     ICD-10-CM: I44.2 ICD-9-CM: 426.0 Vitals LMP OB Status Smoking Status (LMP Unknown) Hysterectomy Former Smoker Preferred Pharmacy Pharmacy Name Phone [phone] [facility], [place] Velasco [place]. [phone] Your Updated Medication List  
  
   
This list is accurate as of: 9/7/17 10:44 AM.  Always use your most recent med list. ADVIL PO Take  by mouth as needed. amLODIPine 2.5 mg tablet Commonly known as:  Jacquetta Couch TAKE ONE TABLET BY MOUTH DAILY  
  
 benazepril 10 mg tablet Commonly known as:  LOTENSIN  
TAKE ONE TABLET BY MOUTH DAILY**GENERIC LOTENSIN**  
  
 co-enzyme Q-10 100 mg capsule Commonly known as:  CO Q-10 Take 1 Cap by mouth daily. ezetimibe 10 mg tablet Commonly known as:  Corinda Dalton Take 1 Tab by mouth daily. HYDROcodone-acetaminophen 5-325 mg per tablet Commonly known as:  Alicia Batman Take 1 Tab by mouth every six (6) hours as needed for Pain. Max Daily Amount: 4 Tabs. NASACORT NA  
by Nasal route as needed. ZyrTEC 5 mg tablet Generic drug:  cetirizine Take 10 mg by mouth daily. Follow-up Instructions Return in about 3 months (around 12/7/2017). Introducing [facility]! Dear [place]: 
Thank you for requesting a [employer] account. Our records indicate that you already have an active [employer] account. You can access your account anytime at [email or web address] Did you know that you can access your hospital and ER discharge instructions at any time in [employer]? You can also review all of your test results from your hospital stay or ER visit. Additional Information If you have questions, please visit the Frequently Asked Questions section of the [employer] website at [email or web address] Remember, [employer] is NOT to be used for urgent needs. For medical emergencies, dial 911. Now available from your iPhone and Android! Please provide this summary of care documentation to your next provider. Your primary care clinician is listed as Brea Lee. If you have any questions after today's visit, please call [phone].

## 2017-09-13 ENCOUNTER — HOSPITAL ENCOUNTER (OUTPATIENT)
Dept: LAB | Age: 82
Discharge: HOME | End: 2017-09-13
Payer: MEDICARE

## 2017-09-13 ENCOUNTER — OFFICE VISIT (OUTPATIENT)
Dept: INTERNAL MEDICINE CLINIC | Age: 82
End: 2017-09-13

## 2017-09-13 VITALS
HEIGHT: 67 IN | OXYGEN SATURATION: 94 % | TEMPERATURE: 98 F | DIASTOLIC BLOOD PRESSURE: 78 MMHG | HEART RATE: 94 BPM | RESPIRATION RATE: 14 BRPM | BODY MASS INDEX: 32.49 KG/M2 | SYSTOLIC BLOOD PRESSURE: 128 MMHG | WEIGHT: 207 LBS

## 2017-09-13 DIAGNOSIS — I10 BENIGN HYPERTENSION: ICD-10-CM

## 2017-09-13 DIAGNOSIS — E78.2 MIXED HYPERLIPIDEMIA: ICD-10-CM

## 2017-09-13 DIAGNOSIS — E11.9 DIABETES MELLITUS TYPE 2, DIET-CONTROLLED (HCC): Primary | ICD-10-CM

## 2017-09-13 DIAGNOSIS — Z79.899 ENCOUNTER FOR LONG-TERM (CURRENT) USE OF MEDICATIONS: ICD-10-CM

## 2017-09-13 PROCEDURE — 82043 UR ALBUMIN QUANTITATIVE: CPT

## 2017-09-13 PROCEDURE — 83036 HEMOGLOBIN GLYCOSYLATED A1C: CPT

## 2017-09-13 PROCEDURE — 80053 COMPREHEN METABOLIC PANEL: CPT

## 2017-09-13 PROCEDURE — 36415 COLL VENOUS BLD VENIPUNCTURE: CPT

## 2017-09-13 RX ORDER — HYDROCODONE BITARTRATE AND ACETAMINOPHEN 5; 325 MG/1; MG/1
1 TABLET ORAL
COMMUNITY
Start: 2017-08-18 | End: 2017-09-13 | Stop reason: ALTCHOICE

## 2017-09-13 RX ORDER — AMLODIPINE BESYLATE 2.5 MG/1
TABLET ORAL
COMMUNITY
Start: 2017-08-08 | End: 2017-09-13 | Stop reason: SDUPTHER

## 2017-09-13 RX ORDER — BENAZEPRIL HYDROCHLORIDE 10 MG/1
TABLET ORAL
COMMUNITY
Start: 2017-08-08 | End: 2017-09-13 | Stop reason: SDUPTHER

## 2017-09-13 RX ORDER — CHOLECALCIFEROL (VITAMIN D3) 125 MCG
100 CAPSULE ORAL
COMMUNITY
Start: 2012-06-12 | End: 2017-09-13 | Stop reason: SDUPTHER

## 2017-09-13 RX ORDER — CETIRIZINE HYDROCHLORIDE 5 MG/1
10 TABLET ORAL
COMMUNITY
End: 2017-09-13 | Stop reason: SDUPTHER

## 2017-09-13 RX ORDER — TRIAMCINOLONE ACETONIDE 55 UG/1
SPRAY, METERED NASAL
COMMUNITY
End: 2017-09-13 | Stop reason: SDUPTHER

## 2017-09-13 RX ORDER — EZETIMIBE 10 MG/1
10 TABLET ORAL
COMMUNITY
Start: 2015-04-02 | End: 2017-09-13 | Stop reason: SDUPTHER

## 2017-09-13 NOTE — PROGRESS NOTES
Subjective:      Taty Gomez is a 80 y.o. female who presents today for follow up of her diet controlled diabetes mellitus type 2, hypertension and hyperlipidemia. She had her pacemaker replaced about 3 weeks ago. It was initially very sore, but pain has improved. They had to move it a little bit in her right chest.    She states that she was able to bowl for the first time this week since the surgery. She is right handed and did not have any pain with bowling. She denies polyuria, polydipsia, nocturia, nausea/vomiting, bowel changes, chest pain, syncope, dyspnea or lightheadedness. Patient Active Problem List   Diagnosis Code    Atrioventricular block, complete ([facility] I44.2     hyperlipidemia E78.5    Hypertension, benign I10    Cardiac pacemaker in situ Z95.0    S/P FERNANDO-BSO Z90.710, Z90.722, Z90.79    Allergy to environmental factors Z91.09    Rotator cuff tear, right M75.101    S/P right cataract extraction Z98.41    History of screening mammography Z92.89    Colon cancer screening Z12.11    Diabetes type 2, controlled ([facility] [address] [facility] E11.9    Advance directive discussed with patient Z71.89    [facility] E11.9, Z01.00     Current Outpatient Prescriptions   Medication Sig Dispense Refill    IBUPROFEN (ADVIL PO) Take  by mouth as needed.  benazepril (LOTENSIN) 10 mg tablet TAKE ONE TABLET BY MOUTH DAILY**GENERIC LOTENSIN** 90 Tab 1    amLODIPine (NORVASC) 2.5 mg tablet TAKE ONE TABLET BY MOUTH DAILY 90 Tab 1    TRIAMCINOLONE ACETONIDE (NASACORT NA) by Nasal route as needed.  ezetimibe (ZETIA) 10 mg tablet Take 1 Tab by mouth daily. 90 Tab 3    co-enzyme Q-10 (CO Q-10) 100 mg capsule Take 1 Cap by mouth daily. 30 Cap 5    cetirizine (ZYRTEC) 5 mg tablet Take 10 mg by mouth daily. Review of Systems    Pertinent items are noted in HPI.      Objective:     Visit Vitals    /78 (BP 1 Location: Left arm, BP Patient Position: Sitting)    Pulse 94    Temp 98 °F (36.7 °C) (Oral)    Resp 14    Ht 5' 7\" (1.702 m)    Wt 207 lb (93.9 kg)    LMP  (LMP Unknown)    SpO2 94%    BMI 32.42 kg/m2     General appearance: alert, cooperative, no distress, appears stated age  Head: Normocephalic, without obvious abnormality, atraumatic  Neck: supple, symmetrical, trachea midline, no adenopathy, no carotid bruit and no JVD  Lungs: clear to auscultation bilaterally  Chest wall: Well healed incision over pacemaker on right chest wall. Heart: regular rate and rhythm  Extremities: no edema    Assessment/Plan:     1. Diabetes mellitus type 2, diet-controlled ([address]  -check labs at this time. -patient is watching her diet. - METABOLIC PANEL, COMPREHENSIVE  - HEMOGLOBIN A1C WITH EAG  - MICROALBUMIN, UR, RAND W/ MICROALBUMIN/CREA RATIO    2. Benign hypertension  -I evaluated and recommended to continue current doses of medications. 3. Mixed hyperlipidemia  -last fasting lipid panel done 8/2016 controlled.  -has been off her zetia as she has run out of this medication . She was receiving it through [employer] assistance program and she needs to reapply at this time. - form printed and completed for patient. 4. Encounter for long-term (current) use of medications    Orders Placed This Encounter    METABOLIC PANEL, COMPREHENSIVE    HEMOGLOBIN A1C WITH EAG    MICROALBUMIN, UR, RAND W/ MICROALBUMIN/CREA RATIO    CKD REPORT    DIABETES PATIENT EDUCATION     Follow-up Disposition:     Follow up in 6 months     Return if symptoms worsen or fail to improve. Advised patient to call back or return to office if symptoms worsen/change/persist.     Discussed expected course/resolution/complications of diagnosis in detail with patient. Medication risks/benefits/costs/interactions/alternatives discussed with patient. Patient was given an after visit summary which includes diagnoses, current medications, & vitals.    Patient expressed understanding with the diagnosis and plan.

## 2017-09-13 NOTE — MR AVS SNAPSHOT
Visit Information Date & Time Provider Department Dept. Phone Encounter #  
 9/13/2017 11:40 AM Chyna Ledesma MD [place] [id number] [facility] [phone] [id number] Follow-up Instructions Return in about 6 months (around 3/13/2018) for diabetes type 2. Your Appointments 12/19/2017 11:15 AM  
PACEMAKER with PACEMAKER3 [facility] ([facility]) Appt Note: med threshold/rc/Armstrong b 9-7-17 chronic CL with [address] [place] [address]  
[address] [employer] [id number]  
  
    
 12/19/2017 11:40 AM  
ESTABLISHED PATIENT with Tae Fuller MD  
[facility] ([facility]) Appt Note: med threshold/rc/Armstrong b 9-7-17 chronic CL with [address] [place]  
[address] [facility],[address] [facility] 7 [id number] Upcoming Health Maintenance Date Due  
 FOOT EXAM Q1 4/13/2017 MICROALBUMIN Q1 4/13/2017 EYE EXAM RETINAL OR DILATED Q1 5/12/2017 INFLUENZA AGE 9 TO ADULT 8/1/2017 LIPID PANEL Q1 8/16/2017 HEMOGLOBIN A1C Q6M 8/28/2017 GLAUCOMA SCREENING Q2Y 5/12/2018 MEDICARE YEARLY EXAM 6/2/2018 DTaP/Tdap/Td series (3 - Td) 11/1/2025 Allergies as of 9/13/2017  Review Complete On: 9/13/2017 By: Chyna Ledesma MD  
  
 Severity Noted Reaction Type Reactions Ciprofloxacin  03/01/2012   Side Effect Other (comments) \"jittery\" Lipitor [Atorvastatin]  03/01/2012   Side Effect Myalgia Mevacor [Lovastatin]  03/01/2012   Side Effect Other (comments) dyspepsia Pcn [Penicillins]  04/04/2011   Side Effect Hives Zocor [Simvastatin]  03/01/2012   Side Effect Myalgia Current Immunizations  Reviewed on 2/28/2017 Name Date Influenza Vaccine 12/11/2015, 10/1/2014, 11/1/2012 Pneumococcal Conjugate (PCV-13) 11/15/2016 TDAP Vaccine 10/1/2006 Tdap 11/1/2015 ZZZ-RETIRED (DO NOT USE) Pneumococcal Vaccine (Unspecified Type) 1/1/2005 Zoster Vaccine, Live 11/1/2012 Not reviewed this visit You Were Diagnosed With   
  
 Codes Comments Diabetes mellitus type 2, diet-controlled ([address]    -  Primary ICD-10-CM: E11.9 ICD-9-CM: 250.00 Benign hypertension     ICD-10-CM: I10 
ICD-9-CM: 401.1 Mixed hyperlipidemia     ICD-10-CM: E78.2 ICD-9-CM: 272.2 Encounter for long-term (current) use of medications     ICD-10-CM: Z79.899 ICD-9-CM: V58.69 Vitals BP Pulse Temp Resp Height(growth percentile) Weight(growth percentile) 128/78 (BP 1 Location: Left arm, BP Patient Position: Sitting) 94 98 °F (36.7 °C) (Oral) 14 5' 7\" (1.702 m) 207 lb (93.9 kg) LMP SpO2 BMI OB Status Smoking Status (LMP Unknown) 94% 32.42 kg/m2 Hysterectomy Former Smoker BMI and BSA Data Body Mass Index Body Surface Area  
 32.42 kg/m 2 2.11 m 2 Preferred Pharmacy Pharmacy Name Phone [facility] [address] [place] [address], [address] [place] [address] [facility]DS. [phone] Your Updated Medication List  
  
   
This list is accurate as of: 9/13/17  1:00 PM.  Always use your most recent med list. ADVIL PO Take  by mouth as needed. amLODIPine 2.5 mg tablet Commonly known as:  Job Dub TAKE ONE TABLET BY MOUTH DAILY  
  
 benazepril 10 mg tablet Commonly known as:  LOTENSIN  
TAKE ONE TABLET BY MOUTH DAILY**GENERIC LOTENSIN**  
  
 co-enzyme Q-10 100 mg capsule Commonly known as:  CO Q-10 Take 1 Cap by mouth daily. ezetimibe 10 mg tablet Commonly known as:  [place] Chain Take 1 Tab by mouth daily. NASACORT NA  
by Nasal route as needed. ZyrTEC 5 mg tablet Generic drug:  cetirizine Take 10 mg by mouth daily. We Performed the Following HEMOGLOBIN A1C WITH EAG [46985 CPT(R)] METABOLIC PANEL, COMPREHENSIVE [62782 CPT(R)] MICROALBUMIN, UR, RAND W/ MICROALBUMIN/CREA RATIO [id number] CPT(R)] Follow-up Instructions Return in about 6 months (around 3/13/2018) for diabetes type 2. Introducing [facility] & [facility] SERVICES! Dear [place]: 
Thank you for requesting a iCet[employer] account. Our records indicate that you already have an active iCetana account. You can access your account anytime at [email or web address] Did you know that you can access your hospital and ER discharge instructions at any time in iCetana? You can also review all of your test results from your hospital stay or ER visit. Additional Information If you have questions, please visit the Frequently Asked Questions section of the iCetana website at [email or web address] Remember, iCetana is NOT to be used for urgent needs. For medical emergencies, dial 911. Now available from your iPhone and Android! Please provide this summary of care documentation to your next provider. Your primary care clinician is listed as Brea Lee. If you have any questions after today's visit, please call [phone].

## 2017-09-13 NOTE — PROGRESS NOTES
Chief Complaint   Patient presents with    Hypertension     4 month follow up     1. Have you been to the ER, urgent care clinic since your last visit? Hospitalized since your last visit? No    2. Have you seen or consulted any other health care providers outside of the [address] [place] [address] since your last visit? Include any pap smears or colon screening.  No

## 2017-09-14 LAB
ALBUMIN SERPL-MCNC: 4.5 G/DL (ref 3.5–4.7)
ALBUMIN/GLOB SERPL: 2 {RATIO} (ref 1.2–2.2)
ALP SERPL-CCNC: 75 IU/L (ref 39–117)
ALT SERPL-CCNC: 23 IU/L (ref 0–32)
AST SERPL-CCNC: 25 IU/L (ref 0–40)
BILIRUB SERPL-MCNC: 0.6 MG/DL (ref 0–1.2)
BUN SERPL-MCNC: 33 MG/DL (ref 8–27)
BUN/CREAT SERPL: 29 (ref 12–28)
CALCIUM SERPL-MCNC: 10 MG/DL (ref 8.7–10.3)
CHLORIDE SERPL-SCNC: 96 MMOL/L (ref 96–106)
CO2 SERPL-SCNC: 28 MMOL/L (ref 18–29)
CREAT SERPL-MCNC: 1.14 MG/DL (ref 0.57–1)
CREAT UR-MCNC: NORMAL MG/DL
EST. AVERAGE GLUCOSE BLD GHB EST-MCNC: 148 MG/DL
GLOBULIN SER CALC-MCNC: 2.3 G/DL (ref 1.5–4.5)
GLUCOSE SERPL-MCNC: 139 MG/DL (ref 65–99)
HBA1C MFR BLD: 6.8 % (ref 4.8–5.6)
INTERPRETATION: NORMAL
Lab: NORMAL
MICROALBUMIN UR-MCNC: NORMAL
POTASSIUM SERPL-SCNC: 4.9 MMOL/L (ref 3.5–5.2)
PROT SERPL-MCNC: 6.8 G/DL (ref 6–8.5)
SODIUM SERPL-SCNC: 139 MMOL/L (ref 134–144)

## 2017-11-19 ENCOUNTER — APPOINTMENT (OUTPATIENT)
Dept: CT IMAGING | Age: 82
End: 2017-11-19
Attending: NURSE PRACTITIONER
Payer: MEDICARE

## 2017-11-19 ENCOUNTER — APPOINTMENT (OUTPATIENT)
Dept: GENERAL RADIOLOGY | Age: 82
End: 2017-11-19
Attending: NURSE PRACTITIONER
Payer: MEDICARE

## 2017-11-19 ENCOUNTER — HOSPITAL ENCOUNTER (EMERGENCY)
Age: 82
Discharge: HOME | End: 2017-11-19
Attending: EMERGENCY MEDICINE
Payer: MEDICARE

## 2017-11-19 VITALS
HEIGHT: 67 IN | DIASTOLIC BLOOD PRESSURE: 83 MMHG | SYSTOLIC BLOOD PRESSURE: 168 MMHG | TEMPERATURE: 98 F | HEART RATE: 90 BPM | WEIGHT: 200 LBS | BODY MASS INDEX: 31.39 KG/M2 | RESPIRATION RATE: 18 BRPM | OXYGEN SATURATION: 96 %

## 2017-11-19 DIAGNOSIS — M25.532 LEFT WRIST PAIN: Primary | ICD-10-CM
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
## 2017-11-19 DIAGNOSIS — S62.102A CLOSED FRACTURE OF LEFT WRIST, INITIAL ENCOUNTER: ICD-10-CM

## 2017-11-19 LAB
ALBUMIN SERPL-MCNC: 3.7 G/DL (ref 3.5–5)
ALBUMIN/GLOB SERPL: 1 {RATIO} (ref 1.1–2.2)
ALP SERPL-CCNC: 75 U/L (ref 45–117)
ALT SERPL-CCNC: 27 U/L (ref 12–78)
ANION GAP SERPL CALC-SCNC: 5 MMOL/L (ref 5–15)
APPEARANCE UR: ABNORMAL
AST SERPL-CCNC: 47 U/L (ref 15–37)
BACTERIA URNS QL MICRO: ABNORMAL /HPF
BASOPHILS # BLD: 0 K/UL (ref 0–0.1)
BASOPHILS NFR BLD: 0 % (ref 0–1)
BILIRUB SERPL-MCNC: 0.7 MG/DL (ref 0.2–1)
BILIRUB UR QL: NEGATIVE
BUN SERPL-MCNC: 22 MG/DL (ref 6–20)
BUN/CREAT SERPL: 27 (ref 12–20)
CALCIUM SERPL-MCNC: 9.2 MG/DL (ref 8.5–10.1)
CHLORIDE SERPL-SCNC: 106 MMOL/L (ref 97–108)
CO2 SERPL-SCNC: 26 MMOL/L (ref 21–32)
COLOR UR: ABNORMAL
CREAT SERPL-MCNC: 0.82 MG/DL (ref 0.55–1.02)
EOSINOPHIL # BLD: 0.1 K/UL (ref 0–0.4)
EOSINOPHIL NFR BLD: 1 % (ref 0–7)
EPITH CASTS URNS QL MICRO: ABNORMAL /LPF
ERYTHROCYTE [DISTWIDTH] IN BLOOD BY AUTOMATED COUNT: 13.2 % (ref 11.5–14.5)
GLOBULIN SER CALC-MCNC: 3.7 G/DL (ref 2–4)
GLUCOSE SERPL-MCNC: 128 MG/DL (ref 65–100)
GLUCOSE UR STRIP.AUTO-MCNC: NEGATIVE MG/DL
HCT VFR BLD AUTO: 43.5 % (ref 35–47)
HGB BLD-MCNC: 14.4 G/DL (ref 11.5–16)
HGB UR QL STRIP: NEGATIVE
HYALINE CASTS URNS QL MICRO: ABNORMAL /LPF (ref 0–5)
KETONES UR QL STRIP.AUTO: NEGATIVE MG/DL
LEUKOCYTE ESTERASE UR QL STRIP.AUTO: ABNORMAL
LYMPHOCYTES # BLD: 1.5 K/UL (ref 0.8–3.5)
LYMPHOCYTES NFR BLD: 15 % (ref 12–49)
MCH RBC QN AUTO: 31.1 PG (ref 26–34)
MCHC RBC AUTO-ENTMCNC: 33.1 G/DL (ref 30–36.5)
MCV RBC AUTO: 94 FL (ref 80–99)
MONOCYTES # BLD: 1 K/UL (ref 0–1)
MONOCYTES NFR BLD: 10 % (ref 5–13)
NEUTS SEG # BLD: 7.9 K/UL (ref 1.8–8)
NEUTS SEG NFR BLD: 74 % (ref 32–75)
NITRITE UR QL STRIP.AUTO: NEGATIVE
PH UR STRIP: 5.5 [PH] (ref 5–8)
PLATELET # BLD AUTO: 161 K/UL (ref 150–400)
POTASSIUM SERPL-SCNC: 5.7 MMOL/L (ref 3.5–5.1)
PROT SERPL-MCNC: 7.4 G/DL (ref 6.4–8.2)
PROT UR STRIP-MCNC: NEGATIVE MG/DL
RBC # BLD AUTO: 4.63 M/UL (ref 3.8–5.2)
RBC #/AREA URNS HPF: ABNORMAL /HPF (ref 0–5)
SODIUM SERPL-SCNC: 137 MMOL/L (ref 136–145)
SP GR UR REFRACTOMETRY: 1.02 (ref 1–1.03)
UROBILINOGEN UR QL STRIP.AUTO: 1 EU/DL (ref 0.2–1)
WBC # BLD AUTO: 10.6 K/UL (ref 3.6–11)
WBC URNS QL MICRO: ABNORMAL /HPF (ref 0–4)

## 2017-11-19 PROCEDURE — 80053 COMPREHEN METABOLIC PANEL: CPT | Performed by: EMERGENCY MEDICINE

## 2017-11-19 PROCEDURE — 36415 COLL VENOUS BLD VENIPUNCTURE: CPT | Performed by: EMERGENCY MEDICINE

## 2017-11-19 PROCEDURE — 85025 COMPLETE CBC W/AUTO DIFF WBC: CPT | Performed by: EMERGENCY MEDICINE

## 2017-11-19 PROCEDURE — 71020 XR CHEST PA LAT: CPT

## 2017-11-19 PROCEDURE — 75810000053 HC SPLINT APPLICATION

## 2017-11-19 PROCEDURE — 70450 CT HEAD/BRAIN W/O DYE: CPT

## 2017-11-19 PROCEDURE — 73110 X-RAY EXAM OF WRIST: CPT

## 2017-11-19 PROCEDURE — 81001 URINALYSIS AUTO W/SCOPE: CPT | Performed by: NURSE PRACTITIONER

## 2017-11-19 PROCEDURE — 99283 EMERGENCY DEPT VISIT LOW MDM: CPT

## 2017-11-19 RX ORDER — DOCUSATE SODIUM 100 MG/1
100 CAPSULE, LIQUID FILLED ORAL 2 TIMES DAILY
Qty: 30 CAP | Refills: 0 | Status: SHIPPED | OUTPATIENT
Start: 2017-11-19 | End: 2017-12-04

## 2017-11-19 RX ORDER — HYDROCODONE BITARTRATE AND ACETAMINOPHEN 5; 325 MG/1; MG/1
1 TABLET ORAL
Qty: 20 TAB | Refills: 0 | Status: SHIPPED | OUTPATIENT
Start: 2017-11-19 | End: 2018-03-14 | Stop reason: ALTCHOICE

## 2017-11-19 NOTE — ED TRIAGE NOTES
Patient comes to the ER c/o L wrist pain. Patient reports having a couple glasses of wine last night and falling.

## 2017-11-19 NOTE — ED PROVIDER NOTES
HPI Comments: Tigre Liang is a 80 y.o. female who presents ambulatory to the ED with  c/o left wrist pain. Patient states she drank martini's last night, got up and fell. Patient states she hit her head, hit her left wrist which appears to be swollen and deformed as well as hitting her head on the floor after a \"face plant. \"  Patient states she took Advil, applied ice and the pain in wrist is no better this morning. Patient states she is sure she \"broke her wrist.\" Denies any neurological deficits. No further complaints at this time. 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 No date: Menopause      Comment: LMP-?  8/2011: Rotator cuff tear      Comment: right  3/2/2012: S/P FERNANDO-BSO    PSHx significant for: Past Surgical History:  03/08: CARDIAC SURG PROCEDURE UNLIST      Comment: Pacemaker Battery Replacement  1996: CARDIAC SURG PROCEDURE UNLIST      Comment: Pacemaker Implant  No date: CHEST SURGERY PROCEDURE UNLISTED      Comment: Pacemaker  11/18/08: ENDOSCOPY, COLON, DIAGNOSTIC      Comment: polyp (Brand)  6/7/10: HX HEENT      Comment: ELIAZAR RICCI  1992: HX ORTHOPAEDIC      Comment: R Elbow Fx - ORIF  1975: HX FERNANDO AND BSO  8/18/2017: [place] REMVL PERM PM PLS GEN W/REPL PLSE GEN 2 ARI*      Comment:      Social Hx: Tobacco: former smoker EtOH: daily use Illicit drug use: none    There are no further complaints or symptoms at this time. The history is provided by the patient.         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  Menopause     LMP-?    Rotator cuff tear 8/2011    right    S/P FERNANDO-BSO 3/2/2012       Past Surgical History:   Procedure Laterality Date    CARDIAC SURG PROCEDURE UNLIST  03/08    Pacemaker Battery Replacement    CARDIAC SURG PROCEDURE UNLIST  1996    Pacemaker Implant    CHEST SURGERY PROCEDURE UNLISTED      Pacemaker    ENDOSCOPY, COLON, DIAGNOSTIC  11/18/08    polyp (Brand)    HX HEENT  6/7/10    ELIAZAR O.D.    HX ORTHOPAEDIC  1992    R Elbow Fx - ORIF    HX FERNANDO AND BSO  1975    [place] REMVL PERM PM PLS GEN W/REPL PLSE GEN 2 LEAD SYS  8/18/2017              Family History:   Problem Relation Age of Onset    Cancer Mother     Stroke Father        Social History     Social History    Marital status:      Spouse name: N/A    Number of children: N/A    Years of education: N/A     Occupational History    Not on file. Social History Main Topics    Smoking status: Former Smoker     Quit date: 4/8/2007    Smokeless tobacco: Never Used    Alcohol use 3.5 - 5.0 oz/week     7 - 10 Standard drinks or equivalent per week      Comment: pt states she drinks vodka and burbon everyday    Drug use: No    Sexual activity: No     Other Topics Concern    Not on file     Social History Narrative         ALLERGIES: Ciprofloxacin; Lipitor [atorvastatin]; Mevacor [lovastatin]; Pcn [penicillins]; and Zocor [simvastatin]    Review of Systems   Constitutional: Negative for appetite change, chills, diaphoresis, fatigue and fever. HENT: Negative for congestion, ear discharge, ear pain, sinus pain, sinus pressure, sore throat and trouble swallowing. Eyes: Negative for photophobia, pain, redness and visual disturbance.    Respiratory: Negative for chest tightness, shortness of breath and wheezing. Cardiovascular: Negative for chest pain and palpitations. Gastrointestinal: Negative for abdominal distention, abdominal pain, nausea and vomiting. Endocrine: Negative. Genitourinary: Negative for difficulty urinating, flank pain, frequency and urgency. Musculoskeletal: Positive for joint swelling (left wrist). Negative for back pain, neck pain (no pain on palpation to cervical spine. no step offs, no deformities. no neurological deficits.) and neck stiffness. Skin: Positive for wound (left upper lip with small lip laceration, hemostatic and scabbed over). Negative for color change, pallor and rash. Allergic/Immunologic: Negative. Neurological: Negative for dizziness, speech difficulty, weakness and headaches. Hematological: Does not bruise/bleed easily. Psychiatric/Behavioral: Negative for behavioral problems. The patient is not nervous/anxious. Vitals:    11/19/17 1018   BP: 168/83   Pulse: 90   Resp: 18   Temp: 98 °F (36.7 °C)   SpO2: 96%   Weight: 90.7 kg (200 lb)   Height: 5' 7\" (1.702 m)            Physical Exam   Constitutional: She is oriented to person, place, and time. She appears well-developed and well-nourished. No distress. HENT:   Head: Normocephalic and atraumatic. Right Ear: External ear normal.   Left Ear: External ear normal.   Nose: Nose normal.   Mouth/Throat: Oropharynx is clear and moist.   Eyes: Conjunctivae and EOM are normal. Pupils are equal, round, and reactive to light. Right eye exhibits no discharge. Left eye exhibits no discharge. Neck: Normal range of motion. Neck supple. No JVD present. No tracheal deviation present. Full range of motion to cervical spine, no pain on palpation, no step offs, no deformities. Cardiovascular: Normal rate, regular rhythm, normal heart sounds and intact distal pulses. Exam reveals no gallop. No murmur heard.   Pulmonary/Chest: Effort normal and breath sounds normal. No respiratory distress. She has no wheezes. She has no rales. She exhibits no tenderness. Abdominal: Soft. Bowel sounds are normal. She exhibits no distension. There is no tenderness. There is no rebound and no guarding. Genitourinary:   Genitourinary Comments: Negative     Musculoskeletal: Normal range of motion. She exhibits tenderness (left wrist) and deformity (left wrist). She exhibits no edema. Neurological: She is alert and oriented to person, place, and time. Skin: Skin is warm and dry. No rash noted. No erythema. No pallor. Psychiatric: She has a normal mood and affect. Her behavior is normal. Judgment and thought content normal.   Nursing note and vitals reviewed. MDM  Number of Diagnoses or Management Options  Closed fracture of left wrist, initial encounter: new and requires workup  Fall, initial encounter: new and requires workup  Left wrist pain: new and requires workup  Diagnosis management comments: Plan:  Discharge to home and follow up with PCP, orthopedics. Return to emergency room with worsening symptoms. Amount and/or Complexity of Data Reviewed  Clinical lab tests: ordered and reviewed  Tests in the radiology section of CPT®: ordered and reviewed  Discuss the patient with other providers: yes (Discussed plan of care with Dr. Raisa Taylor and Dr. Chava Longoria)      ED Course   1110: [place] texted orthopedics Dr. Chava Longoria. Thumb spica recommended by Dr. Chava Longoria for acute nondisplaced fracture of the distal radial metaphysis. Patient and family aware of diagnosis, aware and in agreement with plan of care. 1149: reviewed all labs and radiology reports. Patient with thumb spica and follow up with [facility] orthopedics. 1207:   Pt has been reexamined. Pt has no new complaints, changes or physical findings. Care plan outlined and precautions discussed. All available results were reviewed with pt. All medications were reviewed with pt.  All of pt's questions and concerns were addressed. Pt agrees to F/U as instructed and agrees to return to ED upon further deterioration. Pt is ready to go home.   Arianne Pepe NP    Procedures

## 2017-11-19 NOTE — DISCHARGE INSTRUCTIONS

## 2017-11-19 NOTE — ED NOTES
IV removed. Patient verbalizes understanding of discharge instructions. Wheleed to daughters car by RN and in no acute distress at discharge.

## 2017-11-28 ENCOUNTER — OFFICE VISIT (OUTPATIENT)
Dept: INTERNAL MEDICINE CLINIC | Age: 82
End: 2017-11-28

## 2017-11-28 VITALS
SYSTOLIC BLOOD PRESSURE: 128 MMHG | WEIGHT: 207 LBS | HEIGHT: 67 IN | TEMPERATURE: 96.8 F | RESPIRATION RATE: 15 BRPM | HEART RATE: 91 BPM | BODY MASS INDEX: 32.49 KG/M2 | DIASTOLIC BLOOD PRESSURE: 62 MMHG | OXYGEN SATURATION: 95 %
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
## 2017-11-28 DIAGNOSIS — S62.102A CLOSED FRACTURE OF LEFT WRIST, INITIAL ENCOUNTER: Primary | ICD-10-CM

## 2017-11-28 RX ORDER — INSULIN PUMP SYRINGE, 3 ML
EACH MISCELLANEOUS
Qty: 1 KIT | Refills: 0 | Status: SHIPPED | OUTPATIENT
Start: 2017-11-28 | End: 2020-05-05 | Stop reason: SDUPTHER

## 2017-11-28 NOTE — PROGRESS NOTES
Patient's identity verified with two patient identifiers (name and date of birth). 1. Have you been to the ER, urgent care clinic since your last visit? Hospitalized since your last visit? Yes, see below. 2. Have you seen or consulted any other health care providers outside of the [address] [place] [address] [place] since your last visit? Include any pap smears or colon screening. Yes, see below. Chief Complaint   Patient presents with    Fall     11/18/17 , fell, unsure if she hit/landed on Right wrist.  [facility] ER 11/19/17 for wrist pain, fracture. Did hit her head, CT was normal per patient. Presents today for clarification for \"why she fell\" and why she has a temporary cast.  Denies feeling dizzy/faint prior to.  Nasal Congestion     x1wk, Productive cough, yellow in appearance. Complains of nasal and chest congestion as well- states this is why her chest pain is happening. Taking Mucinex. Not fasting. Health Maintenance Due   Topic Date Due    FOOT EXAM Q1  04/13/2017    EYE EXAM RETINAL OR DILATED Q1  05/12/2017    Influenza Age 9 to Adult   Done per patient, x6wk ago. 08/01/2017    LIPID PANEL Q1  08/16/2017     Reviewed record in preparation for visit and have obtained necessary documentation. Wt Readings from Last 3 Encounters:   11/28/17 207 lb (93.9 kg)   11/19/17 200 lb (90.7 kg)   09/13/17 207 lb (93.9 kg)     Temp Readings from Last 3 Encounters:   11/28/17 96.8 °F (36 °C) (Oral)   11/19/17 98 °F (36.7 °C)   09/13/17 98 °F (36.7 °C) (Oral)     BP Readings from Last 3 Encounters:   11/28/17 128/62   11/19/17 168/83   09/13/17 128/78     Pulse Readings from Last 3 Encounters:   11/28/17 91   11/19/17 90   09/13/17 94     Patient is accompanied by daughter in law, Kaci Chaudhari, I have received verbal consent from [place] to discuss any/all medical information while they are present in the room.

## 2017-11-28 NOTE — PROGRESS NOTES
Subjective:      Kelsey Baxter is a 80 y.o. female who presents today for follow up after a fall. She was seen in the ER on 11/19/17. Dr. Vasiliy Pritchett is the orthopedic that she saw last week. She [place] She had 2 bourbon drinks prior to dinner, spaghetti and bread for dinner, and also 2 glasses of wine with dinner. She felt poorly shortly after dinner. She states that her daughter noted that she was speaking to her but she wasn't very responsive. She was getting up from a chair in the den when she fell. She believes she was \"out\" for about 5 minutes. Her daughter in law was there who is a nurse. They wanted her to go to ER, but she did not until the next day when her hand swelled and was in pain. Patient Active Problem List   Diagnosis Code    Atrioventricular block, complete ([facility] I44.2     hyperlipidemia E78.5    Hypertension, benign I10    Cardiac pacemaker in situ Z95.0    S/P FERNANDO-BSO Z90.710, Z90.722, Z90.79    Allergy to environmental factors Z91.09    Rotator cuff tear, right M75.101    S/P right cataract extraction Z98.41    History of screening mammography Z92.89    Colon cancer screening Z12.11    Diabetes type 2, controlled ([facility] E11.9    Advance directive discussed with patient Z71.89    Diabetic eye exam ([facility] E11.9, Z01.00     Current Outpatient Prescriptions   Medication Sig Dispense Refill    TRAMADOL HCL (TRAMADOL PO) Take  by mouth daily as needed.  Blood-Glucose Meter monitoring kit Use as directed 1 Kit 0    docusate sodium (COLACE) 100 mg capsule Take 1 Cap by mouth two (2) times a day for 15 days. 30 Cap 0    IBUPROFEN (ADVIL PO) Take  by mouth as needed.       benazepril (LOTENSIN) 10 mg tablet TAKE ONE TABLET BY MOUTH DAILY**GENERIC LOTENSIN** 90 Tab 1    amLODIPine (NORVASC) 2.5 mg tablet TAKE ONE TABLET BY MOUTH DAILY 90 Tab 1    co-enzyme Q-10 (CO Q-10) 100 mg capsule Take 1 Cap by mouth daily. 30 Cap 5    cetirizine (ZYRTEC) 5 mg tablet Take 10 mg by mouth daily.  HYDROcodone-acetaminophen (NORCO) 5-325 mg per tablet Take 1 Tab by mouth every six (6) hours as needed for Pain. Max Daily Amount: 4 Tabs. Indications: Pain 20 Tab 0        Review of Systems    Pertinent items are noted in HPI. Objective:     Visit Vitals    /62 (BP 1 Location: Left arm, BP Patient Position: Sitting)    Pulse 91    Temp 96.8 °F (36 °C) (Oral)    Resp 15    Ht 5' 7\" (1.702 m)    Wt 207 lb (93.9 kg)    LMP  (LMP Unknown)    SpO2 95%    BMI 32.42 kg/m2     General appearance: alert, cooperative, no distress, appears stated age  Head: Normocephalic, without obvious abnormality, atraumatic  Neck: supple, symmetrical, trachea midline, no adenopathy, no carotid bruit and no JVD  Lungs: clear to auscultation bilaterally  Heart: regular rate and rhythm, S1, S2 normal, no murmur, click, rub or gallop  Extremities: left forearm and hand in immobilizing bandage. Bruising noted in her 2-5th fingers and dorsal surface of her left hand. Assessment/Plan:     1. Closed fracture of left wrist, initial encounter  -has follow up with ortho next week    2. Fall, initial encounter  -likely due to confusion from hyperglycemia. She had 4 alcoholic drinks, which patient states is not unusual for her and did not feel \"drunk\" from the drinks. As well as a high carbohydrate dinner. She was also likely dehydrated from her alcoholic drinks as well.  -glucometer given to patient today. She is to check her glucose as instructed and to bring in her readings at her next visit. Her daughter in law who is also a diabetic is going to teach her how to use the glucometer. She will monitor her diet as well. Follow-up Disposition:     Has established follow up in 4 months. Return if symptoms worsen or fail to improve.    Advised patient to call back or return to office if symptoms worsen/change/persist.     Discussed expected course/resolution/complications of diagnosis in detail with patient. Medication risks/benefits/costs/interactions/alternatives discussed with patient. Patient was given an after visit summary which includes diagnoses, current medications, & vitals. Patient expressed understanding with the diagnosis and plan.

## 2017-11-28 NOTE — MR AVS SNAPSHOT
Visit Information Date & Time Provider Department Dept. Phone Encounter #  
 11/28/2017 12:20 PM MD [facility] [phone] Follow-up Instructions Return if symptoms worsen or fail to improve. Your Appointments 12/19/2017 11:15 AM  
PACEMAKER with PACEMAKER3[facility] (YULI SCHEDULING) Appt Note: med threshold/rc/Armstrong b 9-7-17 chronic CL with [address] [place] [address]  
[address] [place] [address]  
  
    
 12/19/2017 11:40 AM  
ESTABLISHED PATIENT with Di Donnelly MD  
[facility] ([facility] Appt Note: med threshold/rc/Armstrong b 9-7-17 chronic CL with [address] [place] [address]  
[address] [place] [address]  
  
    
 3/14/2018 11:40 AM  
ROUTINE CARE with MD [facility] ([facility]) Appt Note: 6m diabetes type 2 fu  
 [address] , [address] [place] [address]  
[address], [address] [facility] [address] Upcoming Health Maintenance Date Due  
 FOOT EXAM Q1 4/13/2017 EYE EXAM RETINAL OR DILATED Q1 5/12/2017 LIPID PANEL Q1 8/16/2017 HEMOGLOBIN A1C Q6M 3/13/2018 GLAUCOMA SCREENING Q2Y 5/12/2018 MEDICARE YEARLY EXAM 6/2/2018 MICROALBUMIN Q1 9/13/2018 DTaP/Tdap/Td series (3 - Td) 11/1/2025 Allergies as of 11/28/2017  Review Complete On: 11/28/2017 By: Yisel Amin MD  
  
 Severity Noted Reaction Type Reactions Ciprofloxacin  03/01/2012   Side Effect Other (comments) \"jittery\" Lipitor [Atorvastatin]  03/01/2012   Side Effect Myalgia Mevacor [Lovastatin]  03/01/2012   Side Effect Other (comments) dyspepsia Pcn [Penicillins]  04/04/2011   Side Effect Hives Zocor [Simvastatin]  03/01/2012   Side Effect Myalgia Current Immunizations  Reviewed on 2/28/2017 Name Date Influenza High Dose Vaccine PF 10/17/2017 Influenza Vaccine 12/11/2015, 10/1/2014, 11/1/2012 Pneumococcal Conjugate (PCV-13) 11/15/2016 TDAP Vaccine 10/1/2006 Tdap 11/1/2015 ZZZ-RETIRED (DO NOT USE) Pneumococcal Vaccine (Unspecified Type) 1/1/2005 Zoster Vaccine, Live 11/1/2012 Not reviewed this visit You Were Diagnosed With   
  
 Codes Comments Closed fracture of left wrist, initial encounter    -  Primary ICD-10-CM: O72.769F ICD-9-CM: 814.00 Fall, initial encounter     ICD-10-CM: W19. Jyoti Maguire ICD-9-CM: E888.9 Vitals BP Pulse Temp Resp Height(growth percentile) Weight(growth percentile) 128/62 (BP 1 Location: Left arm, BP Patient Position: Sitting) 91 96.8 °F (36 °C) (Oral) 15 5' 7\" (1.702 m) 207 lb (93.9 kg) LMP SpO2 BMI OB Status Smoking Status (LMP Unknown) 95% 32.42 kg/m2 Hysterectomy Former Smoker Vitals History BMI and BSA Data Body Mass Index Body Surface Area  
 32.42 kg/m 2 2.11 m 2 Preferred Pharmacy Pharmacy Name Phone Yuliet Guevara [address] [place] [address], [address] [place] [address] [place]. [phone] Your Updated Medication List  
  
   
This list is accurate as of: 11/28/17  1:07 PM.  Always use your most recent med list. ADVIL PO Take  by mouth as needed. amLODIPine 2.5 mg tablet Commonly known as:  Gooding Conine TAKE ONE TABLET BY MOUTH DAILY  
  
 benazepril 10 mg tablet Commonly known as:  LOTENSIN  
TAKE ONE TABLET BY MOUTH DAILY**GENERIC LOTENSIN** Blood-Glucose Meter monitoring kit Use as directed  
  
 co-enzyme Q-10 100 mg capsule Commonly known as:  CO Q-10 Take 1 Cap by mouth daily. docusate sodium 100 mg capsule Commonly known as:  Lucius Booze Take 1 Cap by mouth two (2) times a day for 15 days. HYDROcodone-acetaminophen 5-325 mg per tablet Commonly known as:  Fer Punt Take 1 Tab by mouth every six (6) hours as needed for Pain. Max Daily Amount: 4 Tabs. Indications: Pain TRAMADOL PO Take  by mouth daily as needed. ZyrTEC 5 mg tablet Generic drug:  cetirizine Take 10 mg by mouth daily. Prescriptions Printed Refills Blood-Glucose Meter monitoring kit 0 Sig: Use as directed Class: Print Follow-up Instructions Return if symptoms worsen or fail to improve. Introducing [facility] & HEALTH SERVICES! Dear [place]: 
Thank you for requesting a [employer] account. Our records indicate that you already have an active [employer] account. You can access your account anytime at [email or web address] Did you know that you can access your hospital and ER discharge instructions at any time in [employer]? You can also review all of your test results from your hospital stay or ER visit. Additional Information If you have questions, please visit the Frequently Asked Questions section of the [employer] website at [email or web address] Remember, [employer] is NOT to be used for urgent needs. For medical emergencies, dial 911. Now available from your iPhone and Android! Please provide this summary of care documentation to your next provider. Your primary care clinician is listed as Brea Lee. If you have any questions after today's visit, please call [phone].

## 2017-11-29 NOTE — TELEPHONE ENCOUNTER
Received fax from [address] [place] [address] requesting \"KRO BG no code test strips\". Patient was given a monitor Rx at last visit. Rx pended to Dr. Akilah Hartmann, also asked her to provide correct sig.

## 2017-12-01 NOTE — TELEPHONE ENCOUNTER
Rx pended with the requested information for provider review. Dx code listed in note to pharmacy. Requested Prescriptions     Pending Prescriptions Disp Refills    glucose blood VI test strips (ASCENSIA AUTODISC VI, ONE TOUCH ULTRA TEST VI) strip 100 Strip 1     Sig: Use to monitor blood sugar twice daily or as directed.

## 2017-12-01 NOTE — TELEPHONE ENCOUNTER
----- Message from Nevaeh Mayfield sent at 12/1/2017 12:21 PM EST -----  Regarding: Dr. Chery Conte, [address] [facility] [address], needs you to resend RX for blood sugar test strips including ICD code so they can bill M/C. Received one on 11/29, but had no ICD code.   Best number is [phone]

## 2017-12-19 ENCOUNTER — CLINICAL SUPPORT (OUTPATIENT)
Dept: CARDIOLOGY CLINIC | Age: 82
End: 2017-12-19

## 2017-12-19 ENCOUNTER — OFFICE VISIT (OUTPATIENT)
Dept: CARDIOLOGY CLINIC | Age: 82
End: 2017-12-19

## 2017-12-19 VITALS
HEIGHT: 67 IN | HEART RATE: 99 BPM | DIASTOLIC BLOOD PRESSURE: 60 MMHG | BODY MASS INDEX: 32.96 KG/M2 | WEIGHT: 210 LBS | SYSTOLIC BLOOD PRESSURE: 132 MMHG

## 2017-12-19 DIAGNOSIS — Z95.0 CARDIAC PACEMAKER IN SITU: Primary | ICD-10-CM

## 2017-12-19 DIAGNOSIS — I44.2 ATRIOVENTRICULAR BLOCK, COMPLETE (HCC): ICD-10-CM

## 2017-12-19 DIAGNOSIS — I47.1 PAT (PAROXYSMAL ATRIAL TACHYCARDIA) (HCC): ICD-10-CM

## 2017-12-19 NOTE — PROGRESS NOTES
Cardiac Electrophysiology Office Note     Subjective:      Josefa Sauer is a 80 y.o. patient who is seen for evaluation of dual chamber pacemaker. It is [employer] and has replaced in August 2017  She is pacer dependent  The pacemaker lead is from [address] and RV pacing impedance is slightly above 1000 ohms. Pacing threshold is normal.   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     hyperlipidemia E78.5    Hypertension, benign I10    Cardiac pacemaker in situ Z95.0    S/P FERNANDO-BSO Z90.710, Z90.722, Z90.79    Allergy to environmental factors Z91.09    Rotator cuff tear, right M75.101    S/P right cataract extraction Z98.41    History of screening mammography Z92.89    Colon cancer screening Z12.11    Diabetes type 2, controlled ([facility] E11.9    Advance directive discussed with patient Z71.89    Diabetic eye exam ([facility] E11.9, Z01.00     Current Outpatient Prescriptions   Medication Sig Dispense Refill    glucose blood VI test strips (ASCENSIA AUTODISC VI, ONE TOUCH ULTRA TEST VI) strip Use to monitor blood sugar twice daily or as directed. 100 Strip 1    TRAMADOL HCL (TRAMADOL PO) Take  by mouth daily as needed.  Blood-Glucose Meter monitoring kit Use as directed 1 Kit 0    HYDROcodone-acetaminophen (NORCO) 5-325 mg per tablet Take 1 Tab by mouth every six (6) hours as needed for Pain. Max Daily Amount: 4 Tabs. Indications: Pain 20 Tab 0    IBUPROFEN (ADVIL PO) Take  by mouth as needed.  benazepril (LOTENSIN) 10 mg tablet TAKE ONE TABLET BY MOUTH DAILY**GENERIC LOTENSIN** 90 Tab 1    amLODIPine (NORVASC) 2.5 mg tablet TAKE ONE TABLET BY MOUTH DAILY 90 Tab 1    co-enzyme Q-10 (CO Q-10) 100 mg capsule Take 1 Cap by mouth daily. 30 Cap 5    cetirizine (ZYRTEC) 5 mg tablet Take 10 mg by mouth daily. 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  Menopause     LMP-?    Rotator cuff tear 8/2011    right    S/P FERNANDO-BSO 3/2/2012     Past Surgical History:   Procedure Laterality Date    CARDIAC SURG PROCEDURE UNLIST  03/08    Pacemaker Battery Replacement    CARDIAC SURG PROCEDURE UNLIST  1996    Pacemaker Implant    CHEST SURGERY PROCEDURE UNLISTED      Pacemaker    ENDOSCOPY, COLON, DIAGNOSTIC  11/18/08    polyp (Brand)    HX HEENT  6/7/10    ELIAZAR O.D.    HX ORTHOPAEDIC  1992    R Elbow Fx - ORIF    HX FERNANDO AND BSO  1975    GA REMVL PERM PM PLS GEN W/REPL PLSE GEN 2 LEAD SYS  8/18/2017          Family History   Problem Relation Age of Onset    Cancer Mother     Stroke Father      Social History   Substance Use Topics    Smoking status: Former Smoker     Quit date: 4/8/2007    Smokeless tobacco: Never Used    Alcohol use 3.5 - 5.0 oz/week     7 - 10 Standard drinks or equivalent per week      Comment: pt states she drinks vodka and burbon everyday        Review of Systems:   Constitutional: Negative for fever, chills, weight loss, + malaise/fatigue. HEENT: Negative for nosebleeds, vision changes. Respiratory: Negative for cough, hemoptysis  Cardiovascular: Negative for chest pain, palpitations, orthopnea, claudication, leg swelling, syncope, and PND.    Gastrointestinal: Negative for nausea, vomiting, diarrhea, blood in stool and melena. Genitourinary: Negative for dysuria, and hematuria. Musculoskeletal: Negative for myalgias, + arthralgia. Skin: Negative for rash. Heme: Does not bleed or bruise easily. Neurological: Negative for speech change and focal weakness     Objective:     Visit Vitals    /60 (BP 1 Location: Right arm, BP Patient Position: Sitting)    Pulse 99    Ht 5' 7\" (1.702 m)    Wt 210 lb (95.3 kg)    LMP  (LMP Unknown)    BMI 32.89 kg/m2      Physical Exam:   Constitutional: well-developed and well-nourished. No respiratory distress. Head: Normocephalic and atraumatic. Eyes: Pupils are equal, round  ENT: hearing normal  Neck: supple. No JVD present. Cardiovascular: Normal rate, regular rhythm. Exam reveals no gallop and no friction rub. No murmur heard. Pulmonary/Chest: Effort normal and breath sounds normal. No wheezes. Abdominal: Soft, no tenderness. + obesity  Musculoskeletal: no edema. Neurological: alert, oriented. Skin: Skin is warm and dry right sided pacer with healed incision  Psychiatric: normal mood and affect. Behavior is normal. Judgment and thought content normal.         Assessment/Plan:       ICD-10-CM ICD-9-CM    1. Cardiac pacemaker in situ Z95.0 V45.01    2. Atrioventricular block, complete ([facility]) I44.2 426.0    3. PAT (paroxysmal atrial tachycardia) ([facility]) I47.1 427.0         The patient's pacemaker is checked and functions well  Leads are normal in function  She has up to 9 min PAT but dependent on RV pacing so she is asymptomatic    Follow-up Disposition:  Return in about 1 year (around 12/19/2018). and check remotely other 3 months    Thank you for involving me in this patient's care and please call with further concerns or questions. Fifi Rick M.D.   Electrophysiology/Cardiology  [facility] [place]  [facility], [address] [facility], [address]  [place] [id number]  [phone]                                        [phone]

## 2017-12-19 NOTE — PROGRESS NOTES
Chief Complaint   Patient presents with    Slow Heart Rate     complete heart block    Hypertension    Follow-up     3 month follow up     Verified patient with two types of identifiers. Verified medications with the patient. Verified pharmacy with patient.

## 2017-12-19 NOTE — MR AVS SNAPSHOT
Visit Information Date & Time Provider Department Dept. Phone Encounter #  
 12/19/2017 11:40 AM Kaylin Varela MD [facility] Shimon Otoole [phone] [id number] Follow-up Instructions Return in about 1 year (around 12/19/2018). Your Appointments 3/14/2018 11:40 AM  
ROUTINE CARE with Harlan Mohan MD  
[address] [facility] ([facility]) Appt Note: 6m diabetes type 2 fu  
 [address], [address] [place] [address]  
[address], [address] [address] Upcoming Health Maintenance Date Due  
 FOOT EXAM Q1 4/13/2017 EYE EXAM RETINAL OR DILATED Q1 5/12/2017 LIPID PANEL Q1 8/16/2017 HEMOGLOBIN A1C Q6M 3/13/2018 GLAUCOMA SCREENING Q2Y 5/12/2018 MEDICARE YEARLY EXAM 6/2/2018 MICROALBUMIN Q1 9/13/2018 DTaP/Tdap/Td series (3 - Td) 11/1/2025 Allergies as of 12/19/2017  Review Complete On: 12/19/2017 By: Kaylin Varela MD  
  
 Severity Noted Reaction Type Reactions Ciprofloxacin  03/01/2012   Side Effect Other (comments) \"jittery\" Lipitor [Atorvastatin]  03/01/2012   Side Effect Myalgia Mevacor [Lovastatin]  03/01/2012   Side Effect Other (comments) dyspepsia Pcn [Penicillins]  04/04/2011   Side Effect Hives Zocor [Simvastatin]  03/01/2012   Side Effect Myalgia Current Immunizations  Reviewed on 2/28/2017 Name Date Influenza High Dose Vaccine PF 10/17/2017 Influenza Vaccine 12/11/2015, 10/1/2014, 11/1/2012 Pneumococcal Conjugate (PCV-13) 11/15/2016 TDAP Vaccine 10/1/2006 Tdap 11/1/2015 ZZZ-RETIRED (DO NOT USE) Pneumococcal Vaccine (Unspecified Type) 1/1/2005 Zoster Vaccine, Live 11/1/2012 Not reviewed this visit You Were Diagnosed With   
  
 Codes Comments Cardiac pacemaker in situ    -  Primary ICD-10-CM: Z95.0 ICD-9-CM: V45.01 Atrioventricular block, complete (HCC)     ICD-10-CM: I44.2 ICD-9-CM: 426.0 Vitals BP Pulse Height(growth percentile) Weight(growth percentile) LMP BMI  
 132/60 (BP 1 Location: Right arm, BP Patient Position: Sitting) 99 5' 7\" (1.702 m) 210 lb (95.3 kg) (LMP Unknown) 32.89 kg/m2 OB Status Smoking Status Hysterectomy Former Smoker Vitals History BMI and BSA Data Body Mass Index Body Surface Area  
 32.89 kg/m 2 2.12 m 2 Preferred Pharmacy Pharmacy Name Phone  [facility] [place], [address]as[address] [place]. [phone] Your Updated Medication List  
  
   
This list is accurate as of: 12/19/17 11:42 AM.  Always use your most recent med list. ADVIL PO Take  by mouth as needed. amLODIPine 2.5 mg tablet Commonly known as:  [place] Salle TAKE ONE TABLET BY MOUTH DAILY  
  
 benazepril 10 mg tablet Commonly known as:  LOTENSIN  
TAKE ONE TABLET BY MOUTH DAILY**GENERIC LOTENSIN** Blood-Glucose Meter monitoring kit Use as directed  
  
 co-enzyme Q-10 100 mg capsule Commonly known as:  CO Q-10 Take 1 Cap by mouth daily. glucose blood VI test strips strip Commonly known as:  ASCENSIA AUTODISC VI, ONE TOUCH ULTRA TEST VI  
Use to monitor blood sugar twice daily or as directed. HYDROcodone-acetaminophen 5-325 mg per tablet Commonly known as:  Aries Caldera Take 1 Tab by mouth every six (6) hours as needed for Pain. Max Daily Amount: 4 Tabs. Indications: Pain TRAMADOL PO Take  by mouth daily as needed. ZyrTEC 5 mg tablet Generic drug:  cetirizine Take 10 mg by mouth daily. Follow-up Instructions Return in about 1 year (around 12/19/2018). Introducing [facility] & HEALTH SERVICES! Dear [place]: 
Thank you for requesting a [email or web address] account. Our records indicate that you already have an active [email or web address] account. You can access your account anytime at [email or web address] Did you know that you can access your hospital and ER discharge instructions at any time in [email or web address]? You can also review all of your test results from your hospital stay or ER visit. Additional Information If you have questions, please visit the Frequently Asked Questions section of the [email or web address] website at [email or web address] Remember, [email or web address] is NOT to be used for urgent needs. For medical emergencies, dial 911. Now available from your iPhone and Android! Please provide this summary of care documentation to your next provider. Your primary care clinician is listed as Brea Lee. If you have any questions after today's visit, please call [phone].

## 2018-03-14 ENCOUNTER — HOSPITAL ENCOUNTER (OUTPATIENT)
Dept: LAB | Age: 83
Discharge: HOME | End: 2018-03-14
Payer: MEDICARE

## 2018-03-14 ENCOUNTER — OFFICE VISIT (OUTPATIENT)
Dept: INTERNAL MEDICINE CLINIC | Age: 83
End: 2018-03-14

## 2018-03-14 ENCOUNTER — TELEPHONE (OUTPATIENT)
Dept: INTERNAL MEDICINE CLINIC | Age: 83
End: 2018-03-14

## 2018-03-14 VITALS
WEIGHT: 206 LBS | RESPIRATION RATE: 15 BRPM | HEART RATE: 89 BPM | TEMPERATURE: 97.8 F | BODY MASS INDEX: 32.33 KG/M2 | OXYGEN SATURATION: 96 % | DIASTOLIC BLOOD PRESSURE: 82 MMHG | HEIGHT: 67 IN | SYSTOLIC BLOOD PRESSURE: 130 MMHG

## 2018-03-14 DIAGNOSIS — H61.21 IMPACTED CERUMEN OF RIGHT EAR: ICD-10-CM

## 2018-03-14 DIAGNOSIS — Z79.899 ENCOUNTER FOR LONG-TERM (CURRENT) USE OF MEDICATIONS: ICD-10-CM

## 2018-03-14 DIAGNOSIS — E11.9 DIABETES MELLITUS TYPE 2, DIET-CONTROLLED (HCC): Primary | ICD-10-CM

## 2018-03-14 DIAGNOSIS — E78.2 MIXED HYPERLIPIDEMIA: ICD-10-CM

## 2018-03-14 DIAGNOSIS — I10 BENIGN HYPERTENSION: ICD-10-CM

## 2018-03-14 LAB — GLUCOSE POC: 125 MG/DL

## 2018-03-14 PROCEDURE — 36415 COLL VENOUS BLD VENIPUNCTURE: CPT

## 2018-03-14 PROCEDURE — 80053 COMPREHEN METABOLIC PANEL: CPT

## 2018-03-14 PROCEDURE — 83036 HEMOGLOBIN GLYCOSYLATED A1C: CPT

## 2018-03-14 PROCEDURE — 80061 LIPID PANEL: CPT

## 2018-03-14 NOTE — PROGRESS NOTES
Patient's identity verified with two patient identifiers (name and date of birth). 1. Have you been to the ER, urgent care clinic since your last visit? Hospitalized since your last visit? No  2. Have you seen or consulted any other health care providers outside of the [address] [place] [address] since your last visit? Include any pap smears or colon screening. No    Chief Complaint   Patient presents with    Diabetes     6 month follow up    Ear Fullness     Bilateral, x2wks. Intermittent ringing/echo, but denies pressure/pain. Requesting this to be examined/cleaned. Thinks it is wax. Not fasting. Health Maintenance Due   Topic Date Due    FOOT EXAM Q1   Has not had, does not have a podiatrist. 04/13/2017    EYE EXAM RETINAL OR DILATED Q1   Done last yr, Boris Saravia MD, [place], due 6/2018 per patient. 05/12/2017    LIPID PANEL Q1   due 08/16/2017    HEMOGLOBIN A1C Q6M   due 03/13/2018    GLAUCOMA SCREENING Q2Y   Done last yr, due 6/2018. 05/12/2018     Reviewed record in preparation for visit and have obtained necessary documentation.     Wt Readings from Last 3 Encounters:   03/14/18 206 lb (93.4 kg)   12/19/17 210 lb (95.3 kg)   11/28/17 207 lb (93.9 kg)     Temp Readings from Last 3 Encounters:   03/14/18 97.8 °F (36.6 °C) (Oral)   11/28/17 96.8 °F (36 °C) (Oral)   11/19/17 98 °F (36.7 °C)     BP Readings from Last 3 Encounters:   03/14/18 130/82   12/19/17 132/60   11/28/17 128/62     Pulse Readings from Last 3 Encounters:   03/14/18 89   12/19/17 99   11/28/17 91       Learning Assessment:  :     Learning Assessment 3/14/2018 8/12/2015 4/2/2014   PRIMARY LEARNER Patient Patient Patient   HIGHEST LEVEL OF EDUCATION - PRIMARY LEARNER  2 YEARS OF COLLEGE SOME COLLEGE SOME COLLEGE   BARRIERS PRIMARY LEARNER NONE NONE NONE   CO-LEARNER CAREGIVER No No No   PRIMARY LANGUAGE ENGLISH ENGLISH ENGLISH    NEED - No No   LEARNER PREFERENCE PRIMARY READING DEMONSTRATION READING DEMONSTRATION - -   LEARNING SPECIAL TOPICS - no no   ANSWERED BY patient patient patient   RELATIONSHIP SELF SELF SELF

## 2018-03-14 NOTE — TELEPHONE ENCOUNTER
----- Message from Janet Good MD sent at 3/14/2018  2:54 PM EDT -----  Please get her last eye exam from Dr. Bee Grier

## 2018-03-14 NOTE — MR AVS SNAPSHOT
[address] [facility], [address] [place] 
[phone] Patient: Rodrigo Cummins MRN: [id number] TCT:9/20/5424 Visit Information Date & Time Provider Department Dept. Phone Encounter #  
 3/14/2018 11:40 AM MD [place] [id number] [facility] [phone] [id number] Follow-up Instructions Return in about 6 months (around 9/14/2018) for hypertension, diabetes type 2, hyperlipidemia. Your Appointments 1/15/2019  1:00 PM  
PACEMAKER with [id number] [facility] ([facility]UL[facility] SCHEDULING) Appt Note: siena dhillon, annual thresh/CL, see [address] [facility] [address] [place]  
[address] [facility]  
  
    
 1/15/2019  1:00 PM  
ESTABLISHED PATIENT with Wil Dunn MD  
[facility] ([address]) Appt Note: siena dhlilon, annual thresh, CL, see [address] [facility] [address] [address]  
[address] [address]  
  
    
  
 3/26/2018  1:15 PM  
REMOTE OFFICE VISIT with Mirella [facility] (YULI SCHEDULING) Appt Note: elmer Anne 12/19/17  
 [address] [place] [address] [address] [place] [address]  
[address] [address]  
  
    
 7/2/2018 10:30 AM  
REMOTE OFFICE VISIT with Mirella [facility] (YULI SCHEDULING) Appt Note: siena dhillon  
 [address] [place] [address] [address] [place] [address]  
[phone]  
  
    
 10/8/2018 11:45 AM  
REMOTE OFFICE VISIT with Mirella [facility] (YULI SCHEDULING) Appt Note: siena dhillon  
 [address] [facility] [address] [place]  
[phone] Upcoming Health Maintenance Date Due  
 FOOT EXAM Q1 4/13/2017 EYE EXAM RETINAL OR DILATED Q1 5/12/2017 LIPID PANEL Q1 8/16/2017 HEMOGLOBIN A1C Q6M 3/13/2018 GLAUCOMA SCREENING Q2Y 5/12/2018 MEDICARE YEARLY EXAM 6/2/2018 MICROALBUMIN Q1 9/13/2018 DTaP/Tdap/Td series (3 - Td) 11/1/2025 Allergies as of 3/14/2018  Review Complete On: 3/14/2018 By: Shamar Ennis MD  
  
 Severity Noted Reaction Type Reactions Ciprofloxacin  03/01/2012   Side Effect Other (comments) \"jittery\" Lipitor [Atorvastatin]  03/01/2012   Side Effect Myalgia Mevacor [Lovastatin]  03/01/2012   Side Effect Other (comments) dyspepsia Pcn [Penicillins]  04/04/2011   Side Effect Hives Zocor [Simvastatin]  03/01/2012   Side Effect Myalgia Current Immunizations  Reviewed on 2/28/2017 Name Date Influenza High Dose Vaccine PF 10/17/2017 Influenza Vaccine 12/11/2015, 10/1/2014, 11/1/2012 Pneumococcal Conjugate (PCV-13) 11/15/2016 TDAP Vaccine 10/1/2006 Tdap 11/1/2015 ZZZ-RETIRED (DO NOT USE) Pneumococcal Vaccine (Unspecified Type) 1/1/2005 Zoster Vaccine, Live 11/1/2012 Not reviewed this visit You Were Diagnosed With   
  
 Codes Comments Diabetes mellitus type 2, diet-controlled ([facility]    -  Primary ICD-10-CM: E11.9 ICD-9-CM: 250.00 Benign hypertension     ICD-10-CM: I10 
ICD-9-CM: 401.1 Mixed hyperlipidemia     ICD-10-CM: E78.2 ICD-9-CM: 272.2 Encounter for long-term (current) use of medications     ICD-10-CM: Z79.899 ICD-9-CM: V58.69 Impacted cerumen of right ear     ICD-10-CM: H61.21 ICD-9-CM: 380.4 Vitals BP Pulse Temp Resp Height(growth percentile) Weight(growth percentile) 130/82 (BP 1 Location: Left arm, BP Patient Position: Sitting) 89 97.8 °F (36.6 °C) (Oral) 15 5' 7\" (1.702 m) 206 lb (93.4 kg) LMP SpO2 BMI OB Status Smoking Status (LMP Unknown) 96% 32.26 kg/m2 Hysterectomy Former Smoker Vitals History BMI and BSA Data  Body Mass Index Body Surface Area  
 32.26 kg/m 2 2.1 m 2  
 Preferred Pharmacy Pharmacy Name Phone [facility] [address] [phone] Your Updated Medication List  
  
   
This list is accurate as of 3/14/18 12:53 PM.  Always use your most recent med list. ADVIL PO Take  by mouth as needed. amLODIPine 2.5 mg tablet Commonly known as:  Arva Brazen TAKE ONE TABLET BY MOUTH DAILY  
  
 benazepril 10 mg tablet Commonly known as:  LOTENSIN  
TAKE ONE TABLET BY MOUTH DAILY **GENERIC FOR LOTENSIN** Blood-Glucose Meter monitoring kit Use as directed  
  
 co-enzyme Q-10 100 mg capsule Commonly known as:  CO Q-10 Take 1 Cap by mouth daily. glucose blood VI test strips strip Commonly known as:  ASCENSIA AUTODISC VI, ONE TOUCH ULTRA TEST VI  
Use to monitor blood sugar twice daily or as directed. ZyrTEC 5 mg tablet Generic drug:  cetirizine Take 10 mg by mouth daily. We Performed the Following AMB POC GLUCOSE BLOOD, BY GLUCOSE MONITORING DEVICE [75627 CPT(R)] HEMOGLOBIN A1C WITH EAG [40454 CPT(R)] METABOLIC PANEL, COMPREHENSIVE [80860 CPT(R)] MICROALBUMIN, UR, RAND W/ MICROALB/CREAT RATIO [id number] CPT(R)] Follow-up Instructions Return in about 6 months (around 9/14/2018) for hypertension, diabetes type 2, hyperlipidemia. Introducing [facility]! Dear [place]: 
Thank you for requesting a [email or web address] account. Our records indicate that you already have an active [email or web address] account. You can access your account anytime at [email or web address] Did you know that you can access your hospital and ER discharge instructions at any time in [email or web address]? You can also review all of your test results from your hospital stay or ER visit. Additional Information If you have questions, please visit the Frequently Asked Questions section of the [email or web address] website at [email or web address] Remember, [email or web address] is NOT to be used for urgent needs. For medical emergencies, dial 911. Now available from your iPhone and Android! Please provide this summary of care documentation to your next provider. Your primary care clinician is listed as Brea Lee. If you have any questions after today's visit, please call [phone].

## 2018-03-14 NOTE — PROGRESS NOTES
Subjective:      Tierra Cleaning is a 80 y.o. female who presents today for follow up of her diabetes mellitus type 2 and fullness in both ears and decreased hearing. She follows regularly with her cardiologist and has regular pacemaker checks. She brought her new glucometer in today. Has only used it twice to check her glucose. Reading yesterday were 124 and 140. She is watching now her sugar and carbohydrate intake a little better. She has moved now to her own apartment close to her daughter. Walking a little with her dog. 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     hyperlipidemia E78.5    Hypertension, benign I10    Cardiac pacemaker in situ Z95.0    S/P FERNANDO-BSO Z90.710, Z90.722, Z90.79    Allergy to environmental factors Z91.09    Rotator cuff tear, right M75.101    S/P right cataract extraction Z98.41    History of screening mammography Z92.89    Colon cancer screening Z12.11    Diabetes type 2, controlled ([facility] E11.9    Advance directive discussed with patient Z71.89    [facility] E11.9, Z01.00    PAT (paroxysmal atrial tachycardia) ([facility]) I47.1     Current Outpatient Prescriptions   Medication Sig Dispense Refill    benazepril (LOTENSIN) 10 mg tablet TAKE ONE TABLET BY MOUTH DAILY **GENERIC FOR LOTENSIN** 90 Tab 0    amLODIPine (NORVASC) 2.5 mg tablet TAKE ONE TABLET BY MOUTH DAILY 90 Tab 0    glucose blood VI test strips (ASCENSIA AUTODISC VI, ONE TOUCH ULTRA TEST VI) strip Use to monitor blood sugar twice daily or as directed. 100 Strip 1    Blood-Glucose Meter monitoring kit Use as directed 1 Kit 0    IBUPROFEN (ADVIL PO) Take  by mouth as needed.  co-enzyme Q-10 (CO Q-10) 100 mg capsule Take 1 Cap by mouth daily. 30 Cap 5    cetirizine (ZYRTEC) 5 mg tablet Take 10 mg by mouth daily. Review of Systems    Pertinent items are noted in HPI.      Objective:     Visit Vitals    BP 130/82 (BP 1 Location: Left arm, BP Patient Position: Sitting)    Pulse 89    Temp 97.8 °F (36.6 °C) (Oral)    Resp 15    Ht 5' 7\" (1.702 m)    Wt 206 lb (93.4 kg)    LMP  (LMP Unknown)    SpO2 96%    BMI 32.26 kg/m2     General appearance: alert, cooperative, no distress, appears stated age  Head: Normocephalic, without obvious abnormality, atraumatic  Ears: abnormal external canal AD - ceruminosis impacting canal, cerumen removed by irrigation, normal TM noted after wax removal, normal external canal AS. Lungs: clear to auscultation bilaterally  Heart: regular rate and rhythm    Assessment/Plan:     1. Diabetes mellitus type 2, diet-controlled ([address]  -glucometer working properly.   -encouraged maintaining diabetes mellitus diet. - METABOLIC PANEL, COMPREHENSIVE  - HEMOGLOBIN A1C WITH EAG  - MICROALBUMIN, UR, RAND W/ MICROALB/CREAT RATIO  - AMB POC GLUCOSE BLOOD, BY GLUCOSE MONITORING DEVICE    2. Benign hypertension  -I evaluated and recommended to continue current doses of medications.     - METABOLIC PANEL, COMPREHENSIVE    3. Mixed hyperlipidemia  -maintain low fat diet. - METABOLIC PANEL, COMPREHENSIVE  - LIPID PANEL    4. Encounter for long-term (current) use of medications      5. Impacted cerumen of right ear  -right ear irrigated without any complications.     - TX REMOVAL IMPACTED CERUMEN IRRIGATION/LVG UNILAT     Orders Placed This Encounter    METABOLIC PANEL, COMPREHENSIVE    HEMOGLOBIN A1C WITH EAG    MICROALBUMIN, UR, RAND W/ MICROALB/CREAT RATIO    LIPID PANEL    AMB POC GLUCOSE BLOOD, BY GLUCOSE MONITORING DEVICE    TX REMOVAL IMPACTED CERUMEN IRRIGATION/LVG UNILAT      Follow-up Disposition:     Follow up in 6 months     Return if symptoms worsen or fail to improve. Advised patient to call back or return to office if symptoms worsen/change/persist.     Discussed expected course/resolution/complications of diagnosis in detail with patient.    Medication risks/benefits/costs/interactions/alternatives discussed with patient. Patient was given an after visit summary which includes diagnoses, current medications, & vitals. Patient expressed understanding with the diagnosis and plan.

## 2018-03-14 NOTE — TELEPHONE ENCOUNTER
Fax sent to [facility] for Dr. Romana Keener requesting recent eye exam for patient. Confirmation received and scanned into chart.

## 2018-03-15 LAB
ALBUMIN SERPL-MCNC: 4.4 G/DL (ref 3.5–4.7)
ALBUMIN/GLOB SERPL: 1.9 {RATIO} (ref 1.2–2.2)
ALP SERPL-CCNC: 68 IU/L (ref 39–117)
ALT SERPL-CCNC: 18 IU/L (ref 0–32)
AST SERPL-CCNC: 23 IU/L (ref 0–40)
BILIRUB SERPL-MCNC: 0.7 MG/DL (ref 0–1.2)
BUN SERPL-MCNC: 18 MG/DL (ref 8–27)
BUN/CREAT SERPL: 22 (ref 12–28)
CALCIUM SERPL-MCNC: 9.7 MG/DL (ref 8.7–10.3)
CHLORIDE SERPL-SCNC: 102 MMOL/L (ref 96–106)
CHOLEST SERPL-MCNC: 241 MG/DL (ref 100–199)
CO2 SERPL-SCNC: 29 MMOL/L (ref 18–29)
CREAT SERPL-MCNC: 0.82 MG/DL (ref 0.57–1)
EST. AVERAGE GLUCOSE BLD GHB EST-MCNC: 117 MG/DL
GFR SERPLBLD CREATININE-BSD FMLA CKD-EPI: 66 ML/MIN/1.73
GFR SERPLBLD CREATININE-BSD FMLA CKD-EPI: 77 ML/MIN/1.73
GLOBULIN SER CALC-MCNC: 2.3 G/DL (ref 1.5–4.5)
GLUCOSE SERPL-MCNC: 102 MG/DL (ref 65–99)
HBA1C MFR BLD: 5.7 % (ref 4.8–5.6)
HDLC SERPL-MCNC: 75 MG/DL
INTERPRETATION, 910389: NORMAL
LDLC SERPL CALC-MCNC: 149 MG/DL (ref 0–99)
Lab: NORMAL
POTASSIUM SERPL-SCNC: 4.1 MMOL/L (ref 3.5–5.2)
PROT SERPL-MCNC: 6.7 G/DL (ref 6–8.5)
SODIUM SERPL-SCNC: 146 MMOL/L (ref 134–144)
TRIGL SERPL-MCNC: 83 MG/DL (ref 0–149)
VLDLC SERPL CALC-MCNC: 17 MG/DL (ref 5–40)

## 2018-03-26 ENCOUNTER — OFFICE VISIT (OUTPATIENT)
Dept: CARDIOLOGY CLINIC | Age: 83
End: 2018-03-26

## 2018-03-26 DIAGNOSIS — Z95.0 CARDIAC PACEMAKER IN SITU: Primary | ICD-10-CM

## 2018-09-11 ENCOUNTER — OFFICE VISIT (OUTPATIENT)
Dept: INTERNAL MEDICINE CLINIC | Age: 83
End: 2018-09-11

## 2018-09-11 ENCOUNTER — TELEPHONE (OUTPATIENT)
Dept: INTERNAL MEDICINE CLINIC | Age: 83
End: 2018-09-11

## 2018-09-11 ENCOUNTER — HOSPITAL ENCOUNTER (OUTPATIENT)
Dept: LAB | Age: 83
Discharge: HOME | End: 2018-09-11
Payer: MEDICARE

## 2018-09-11 VITALS
WEIGHT: 202 LBS | BODY MASS INDEX: 31.71 KG/M2 | HEIGHT: 67 IN | HEART RATE: 86 BPM | OXYGEN SATURATION: 95 % | SYSTOLIC BLOOD PRESSURE: 138 MMHG | DIASTOLIC BLOOD PRESSURE: 82 MMHG | RESPIRATION RATE: 15 BRPM | TEMPERATURE: 97.5 F

## 2018-09-11 DIAGNOSIS — Z13.39 SCREENING FOR ALCOHOLISM: ICD-10-CM

## 2018-09-11 DIAGNOSIS — I10 BENIGN HYPERTENSION: ICD-10-CM

## 2018-09-11 DIAGNOSIS — Z13.31 SCREENING FOR DEPRESSION: ICD-10-CM

## 2018-09-11 DIAGNOSIS — Z00.00 MEDICARE ANNUAL WELLNESS VISIT, SUBSEQUENT: Primary | ICD-10-CM

## 2018-09-11 DIAGNOSIS — E11.9 DIABETES MELLITUS TYPE 2, DIET-CONTROLLED (HCC): ICD-10-CM

## 2018-09-11 DIAGNOSIS — H61.23 BILATERAL IMPACTED CERUMEN: ICD-10-CM

## 2018-09-11 DIAGNOSIS — Z79.899 ENCOUNTER FOR LONG-TERM (CURRENT) USE OF MEDICATIONS: ICD-10-CM

## 2018-09-11 PROCEDURE — 36415 COLL VENOUS BLD VENIPUNCTURE: CPT

## 2018-09-11 PROCEDURE — 80053 COMPREHEN METABOLIC PANEL: CPT

## 2018-09-11 PROCEDURE — 83036 HEMOGLOBIN GLYCOSYLATED A1C: CPT

## 2018-09-11 NOTE — PATIENT INSTRUCTIONS
Medicare Wellness Visit, Female The best way to live healthy is to have a lifestyle where you eat a well-balanced diet, exercise regularly, limit alcohol use, and quit all forms of tobacco/nicotine, if applicable. Regular preventive services are another way to keep healthy. Preventive services (vaccines, screening tests, monitoring & exams) can help personalize your care plan, which helps you manage your own care. Screening tests can find health problems at the earliest stages, when they are easiest to treat. [facility] follows the current, evidence-based guidelines published by the [facility]nn ([facility]) when recommending preventive services for our patients. Because we follow these guidelines, sometimes recommendations change over time as research supports it. (For example, mammograms used to be recommended annually. Even though Medicare will still pay for an annual mammogram, the newer guidelines recommend a mammogram every two years for women of average risk.) Of course, you and your doctor may decide to screen more often for some diseases, based on your risk and your health status. Preventive services for you include: - Medicare offers their members a free annual wellness visit, which is time for you and your primary care provider to discuss and plan for your preventive service needs. Take advantage of this benefit every year! 
-All adults over the age of 72 should receive the recommended pneumonia vaccines. Current USPSTF guidelines recommend a series of two vaccines for the best pneumonia protection.  
-All adults should have a flu vaccine yearly and a tetanus vaccine every 10 years. All adults age 61 and older should receive a shingles vaccine once in their lifetime.   
-A bone mass density test is recommended when a woman turns 65 to screen for osteoporosis. This test is only recommended one time, as a screening. Some providers will use this same test as a disease monitoring tool if you already have osteoporosis. -All adults age 38-68 who are overweight should have a diabetes screening test once every three years.  
-Other screening tests and preventive services for persons with diabetes include: an eye exam to screen for diabetic retinopathy, a kidney function test, a foot exam, and stricter control over your cholesterol.  
-Cardiovascular screening for adults with routine risk involves an electrocardiogram (ECG) at intervals determined by your doctor.  
-Colorectal cancer screenings should be done for adults age 54-65 with no increased risk factors for colorectal cancer. There are a number of acceptable methods of screening for this type of cancer. Each test has its own benefits and drawbacks. Discuss with your doctor what is most appropriate for you during your annual wellness visit. The different tests include: colonoscopy (considered the best screening method), a fecal occult blood test, a fecal DNA test, and sigmoidoscopy. -Breast cancer screenings are recommended every other year for women of normal risk, age 54-69. 
-Cervical cancer screenings for women over age 72 are only recommended with certain risk factors.  
-All adults born between [facility] should be screened once for Hepatitis C. Here is a list of your current Health Maintenance items (your personalized list of preventive services) with a due date: 
Health Maintenance Due Topic Date Due  
 Diabetic Foot Care  04/13/2017 24 \[facility]\"" Eye Exam  05/23/2018 24 \[facility]\"" Annual Well Visit  06/02/2018  Albumin Urine Test  09/13/2018  Hemoglobin A1C    09/14/2018

## 2018-09-11 NOTE — PROGRESS NOTES
Subjective:  
  
Kingston Mercer is a 80 y.o. female who presents today for follow up of her diabetes mellitus type 2 - diet controlled and hypertension. She is also present for her annual wellness exam.  
 
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 Has had increased ringing in her ears. Had hearing test, several months ago and she is need of hearing aids. She has had also increased rhinitis, right side more than the left. Has also had increased post nasal drip. Taking her zyrtec 10mg daily. Fasting glucose in the morning is 120's to 130's. Patient Active Problem List  
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  
 Cardiac pacemaker in situ Z95.0  
 S/P FERNANDO-BSO Z90.710, Z90.722, Z90.79  Allergy to environmental factors Z91.09  
 Rotator cuff tear, right M75.101  S/P right cataract extraction Z98.41  
 History of screening mammography Z92.89  
 Colon cancer screening Z12.11  
 Diabetes type 2, controlled ([facility] E11.9  Advance directive discussed with patient Z70.80  
 Diabetic eye exam ([facility] Z01.00, E11.9  
 PAT (paroxysmal atrial tachycardia) ([facility]) I47.1 Current Outpatient Prescriptions Medication Sig Dispense Refill  amLODIPine (NORVASC) 2.5 mg tablet TAKE ONE TABLET BY MOUTH DAILY 90 Tab 0  
 benazepril (LOTENSIN) 10 mg tablet TAKE ONE TABLET BY MOUTH DAILY *GENERIC FOR LOTENSIN* 90 Tab 0  
 glucose blood VI test strips (ASCENSIA AUTODISC VI, ONE TOUCH ULTRA TEST VI) strip Use to monitor blood sugar twice daily or as directed. 100 Strip 1  Blood-Glucose Meter monitoring kit Use as directed 1 Kit 0  
 IBUPROFEN (ADVIL PO) Take  by mouth as needed.  co-enzyme Q-10 (CO Q-10) 100 mg capsule Take 1 Cap by mouth daily. 30 Cap 5  cetirizine (ZYRTEC) 5 mg tablet Take 10 mg by mouth daily. Review of Systems Pertinent items are noted in HPI. Objective:  
 
Visit Vitals  /82 (BP 1 Location: Left arm, BP Patient Position: Sitting)  Pulse 86  Temp 97.5 °F (36.4 °C) (Oral)  Resp 15  Ht 5' 7\" (1.702 m)  Wt 202 lb (91.6 kg)  LMP  (LMP Unknown)  SpO2 95%  BMI 31.64 kg/m2 General appearance: alert, cooperative, no distress, appears stated age Head: Normocephalic, without obvious abnormality, atraumatic Ears: abnormal external canal AD - not visualized secondary to cerumen, abnormal external canal AS - not visualized secondary to cerumen Neck: supple, symmetrical, trachea midline, no adenopathy, no carotid bruit and no JVD Lungs: clear to auscultation bilaterally Heart: regular rate and rhythm, S1, S2 normal, no murmur, click, rub or gallop Extremities: varicose veins noted Assessment/Plan: 1. Diabetes mellitus type 2, diet-controlled ([facility] 
-she has been watching her diet.  
-check labs at this time.  
-due for eye exam.  
 
- METABOLIC PANEL, COMPREHENSIVE 
- HEMOGLOBIN A1C WITH EAG 
- MICROALBUMIN, UR, RAND W/ MICROALB/CREAT RATIO 2. Benign hypertension 
-I evaluated and recommended to continue current doses of medications.  
 
- METABOLIC PANEL, COMPREHENSIVE 3. Encounter for long-term (current) use of medications 4. Medicare annual wellness visit, subsequent 
-completed today. - Annual  Alcohol Screen 15 min () - Depression Screen Annual 
 
5. Screening for alcoholism - Annual  Alcohol Screen 15 min () 6. Screening for depression - Depression Screen Annual  
 
7. Cerumen impaction bilateral 
-patient to use cerumen softener drops for the next 3 days then return for irrigation. Orders Placed This Encounter  Depression Screen Annual  
 METABOLIC PANEL, COMPREHENSIVE  
 HEMOGLOBIN A1C WITH EAG  
 MICROALBUMIN, UR, RAND W/ MICROALB/CREAT RATIO  CKD REPORT  DIABETES PATIENT EDUCATION  
 UNABLE TO VOID  Annual  Alcohol Screen 15 min () Follow-up Disposition:  
 
Follow up 4 months Return if symptoms worsen or fail to improve. Advised patient to call back or return to office if symptoms worsen/change/persist.  
 
Discussed expected course/resolution/complications of diagnosis in detail with patient. Medication risks/benefits/costs/interactions/alternatives discussed with patient. Patient was given an after visit summary which includes diagnoses, current medications, & vitals. Patient expressed understanding with the diagnosis and plan. This is the Subsequent Medicare Annual Wellness Exam, performed 12 months or more after the Initial AWV or the last Subsequent AWV I have reviewed the patient's medical history in detail and updated the computerized patient record. History Past Medical History:  
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  Menopause LMP-?  
 Rotator cuff tear 8/2011  
 right  S/P FERNANDO-BSO 3/2/2012 Past Surgical History:  
Procedure Laterality Date  CARDIAC SURG PROCEDURE UNLIST  03/08 Pacemaker Battery Replacement [facility] Pacemaker Implant  CHEST SURGERY PROCEDURE UNLISTED Pacemaker  ENDOSCOPY, COLON, DIAGNOSTIC  11/18/08  
 polyp (Brand)  HX HEENT  6/7/10 ELIAZAR O.D.  
[address] [facility] R Elbow Fx - ORIF  
 HX FERNANDO AND BSO  1975  HI REMVL PERM PM PLS GEN W/REPL PLSE GEN 2 LEAD SYS  8/18/2017 Current Outpatient Prescriptions Medication Sig Dispense Refill  amLODIPine (NORVASC) 2.5 mg tablet TAKE ONE TABLET BY MOUTH DAILY 90 Tab 0  
 benazepril (LOTENSIN) 10 mg tablet TAKE ONE TABLET BY MOUTH DAILY *GENERIC FOR LOTENSIN* 90 Tab 0  
 glucose blood VI test strips (ASCENSIA AUTODISC VI, ONE TOUCH ULTRA TEST VI) strip Use to monitor blood sugar twice daily or as directed. 100 Strip 1  Blood-Glucose Meter monitoring kit Use as directed 1 Kit 0  
 IBUPROFEN (ADVIL PO) Take  by mouth as needed.  co-enzyme Q-10 (CO Q-10) 100 mg capsule Take 1 Cap by mouth daily. 30 Cap 5  cetirizine (ZYRTEC) 5 mg tablet Take 10 mg by mouth daily. Allergies Allergen Reactions  Ciprofloxacin Other (comments) \"jittery\"  Lipitor [Atorvastatin] Myalgia  Mevacor [Lovastatin] Other (comments) dyspepsia  Pcn [Penicillins] Hives  Zocor [Simvastatin] Myalgia Family History Problem Relation Age of Onset  Cancer Mother  Stroke Father Social History Substance Use Topics  Smoking status: Former Smoker Quit date: 4/8/2007  Smokeless tobacco: Never Used  Alcohol use 3.5 - 5.0 oz/week 7 - 10 Standard drinks or equivalent per week Comment: pt states she drinks vodka and burbon everyday Patient Active Problem List  
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  
 Cardiac pacemaker in situ Z95.0  
 S/P FERNANDO-BSO Z90.710, Z90.722, Z90.79  Allergy to environmental factors Z91.09  
 Rotator cuff tear, right M75.101  S/P right cataract extraction Z98.41  
 History of screening mammography Z92.89  
 Colon cancer screening Z12.11  
 Diabetes type 2, controlled ([facility] E11.9  Advance directive discussed with patient Z70.80  
 Diabetic eye exam ([facility] Z01.00, E11.9  
 PAT (paroxysmal atrial tachycardia) ([facility]) I47.1 Depression Risk Factor Screening: PHQ over the last two weeks 9/11/2018 Little interest or pleasure in doing things Not at all Feeling down, depressed, irritable, or hopeless Not at all Total Score PHQ 2 0 Alcohol Risk Factor Screening: You average more than 7 drinks a week. - 1-2 drinks with dinner Functional Ability and Level of Safety:  
Hearing Loss Has had recent hearing testing and needs hearing aid. Activities of Daily Living The home contains: no safety equipment. Patient does total self care Fall Risk Fall Risk Assessment, last 12 mths 9/11/2018 Able to walk? Yes Fall in past 12 months? Yes Fall with injury? Yes  
Number of falls in past 12 months 1 Fall Risk Score 2 Abuse Screen Patient is not abused Cognitive Screening Evaluation of Cognitive Function: 
Has your family/caregiver stated any concerns about your memory: no 
Normal 
 
Patient Care Team  
Patient Care Team: 
Kalani Woodard MD as PCP - General (Internal Medicine) Mariano Toledo [[id number]] (Cardiology) Adrián Ma MD (Ophthalmology) Elijah Smith MD (Cardiology) Assessment/Plan Education and counseling provided: 
Are appropriate based on today's review and evaluation Diagnoses and all orders for this visit: 1. Diabetes mellitus type 2, diet-controlled ([address] 2. Benign hypertension 3. Encounter for long-term (current) use of medications 4. Medicare annual wellness visit, subsequent -     Annual  Alcohol Screen 15 min () -     Depression Screen Annual 
 
5. Screening for alcoholism -     Annual  Alcohol Screen 15 min () 6. Screening for depression -     Depression Screen Annual

## 2018-09-11 NOTE — TELEPHONE ENCOUNTER
Patient in office for visit today 9/11/18 with Dr. Anderson Pfeiffer, signed medical release form for Dr. Cat Raymond for last eye exam.  Fax sent, confirmation received, and placed to be scanned into chart. Christine Simpson    Physician           Specialty     Ophthalmology       Primary Contact Information      Phone Fax E-mail Address     [phone] Not available.  [address] [facility]

## 2018-09-11 NOTE — MR AVS SNAPSHOT
[address] [facility], [address] [facility] 57 
[phone] Patient: Stuart Neely MRN: [id number] XHE:[id number] Visit Information Date & Time Provider Department Dept. Phone Encounter #  
 9/11/2018 11:40 AM [facility] [phone] Follow-up Instructions Return in about 3 days (around 9/14/2018) for ear cleaning. Your Appointments 1/15/2019  1:00 PM  
PACEMAKER with RHIANNA GEORGE [facility] ([place] SCHEDULING) Appt Note: siena dhillon, annual thresh/CL, see [address] [facility] [address] [place] [address]  
[address] [facility]  
  
    
 1/15/2019  1:00 PM  
ESTABLISHED PATIENT with Jackson Nelson MD  
[facility] ([facility]) Appt Note: siena dhillon, annual thresh, CL, see [address] [facility] [address] [facility] [id number]  
[address] [place] [id number]  
  
    
  
 10/8/2018 11:45 AM  
REMOTE OFFICE VISIT with Jakub Zelaya [facility] ([facility] SCHEDULING) Appt Note: siena dhillon  
 [address] [facility] 200 [place] [address]  
[address]01 [facility],[address] [phone] Upcoming Health Maintenance Date Due  
 FOOT EXAM Q1 4/13/2017 EYE EXAM RETINAL OR DILATED Q1 5/23/2018 MICROALBUMIN Q1 9/13/2018 HEMOGLOBIN A1C Q6M 9/14/2018 Influenza Age 5 to Adult 10/1/2018* LIPID PANEL Q1 3/14/2019 GLAUCOMA SCREENING Q2Y 5/23/2019 MEDICARE YEARLY EXAM 9/12/2019 DTaP/Tdap/Td series (3 - Td) 11/1/2025 *Topic was postponed. The date shown is not the original due date. Allergies as of 9/11/2018  Review Complete On: 9/11/2018 By: Beatris Ureña MD  
  
 Severity Noted Reaction Type Reactions Ciprofloxacin  03/01/2012   Side Effect Other (comments) \"jittery\" Lipitor [Atorvastatin]  03/01/2012   Side Effect Myalgia Mevacor [Lovastatin]  03/01/2012   Side Effect Other (comments) dyspepsia Pcn [Penicillins]  04/04/2011   Side Effect Hives Zocor [Simvastatin]  03/01/2012   Side Effect Myalgia Current Immunizations  Reviewed on 2/28/2017 Name Date Influenza High Dose Vaccine PF 10/17/2017 Influenza Vaccine 12/11/2015, 10/1/2014, 11/1/2012 Pneumococcal Conjugate (PCV-13) 11/15/2016 TDAP Vaccine 10/1/2006 Tdap 11/1/2015 ZZZ-RETIRED (DO NOT USE) Pneumococcal Vaccine (Unspecified Type) 1/1/2005 Zoster Vaccine, Live 11/1/2012 Not reviewed this visit You Were Diagnosed With   
  
 Codes Comments Medicare annual wellness visit, subsequent    -  Primary ICD-10-CM: Z00.00 ICD-9-CM: V70.0 Diabetes mellitus type 2, diet-controlled ([facility]     ICD-10-CM: E11.9 ICD-9-CM: 250.00 Benign hypertension     ICD-10-CM: I10 
ICD-9-CM: 401.1 Encounter for long-term (current) use of medications     ICD-10-CM: Z79.899 ICD-9-CM: V58.69 Screening for alcoholism     ICD-10-CM: Z13.89 ICD-9-CM: V79.1 Screening for depression     ICD-10-CM: Z13.89 ICD-9-CM: V79.0 Vitals BP Pulse Temp Resp Height(growth percentile) Weight(growth percentile) 138/82 (BP 1 Location: Left arm, BP Patient Position: Sitting) 86 97.5 °F (36.4 °C) (Oral) 15 5' 7\" (1.702 m) 202 lb (91.6 kg) LMP SpO2 BMI OB Status Smoking Status (LMP Unknown) 95% 31.64 kg/m2 Hysterectomy Former Smoker Vitals History BMI and BSA Data Body Mass Index Body Surface Area  
 31.64 kg/m 2 2.08 m 2 Preferred Pharmacy Pharmacy Name Phone [facility] [phone] Your Updated Medication List  
  
   
This list is accurate as of 9/11/18 12:41 PM.  Always use your most recent med list.  
  
  
  
  
 ADVIL PO Take  by mouth as needed. amLODIPine 2.5 mg tablet Commonly known as:  Lennis Eagles TAKE ONE TABLET BY MOUTH DAILY  
  
 benazepril 10 mg tablet Commonly known as:  LOTENSIN  
TAKE ONE TABLET BY MOUTH DAILY *GENERIC FOR LOTENSIN* Blood-Glucose Meter monitoring kit Use as directed  
  
 co-enzyme Q-10 100 mg capsule Commonly known as:  CO Q-10 Take 1 Cap by mouth daily. glucose blood VI test strips strip Commonly known as:  ASCENSIA AUTODISC VI, ONE TOUCH ULTRA TEST VI  
Use to monitor blood sugar twice daily or as directed. ZyrTEC 5 mg tablet Generic drug:  cetirizine Take 10 mg by mouth daily. We Performed the Following [address] [[id number] HCPCS] HEMOGLOBIN A1C WITH EAG [23514 CPT(R)] METABOLIC PANEL, COMPREHENSIVE [11980 CPT(R)] MICROALBUMIN, UR, RAND W/ MICROALB/CREAT RATIO [id number] CPT(R)] DE ANNUAL ALCOHOL SCREEN 15 MIN [id number] HCP] Follow-up Instructions Return in about 3 days (around 9/14/2018) for ear cleaning. Patient Instructions Medicare Wellness Visit, Female The best way to live healthy is to have a lifestyle where you eat a well-balanced diet, exercise regularly, limit alcohol use, and quit all forms of tobacco/nicotine, if applicable. Regular preventive services are another way to keep healthy. Preventive services (vaccines, screening tests, monitoring & exams) can help personalize your care plan, which helps you manage your own care. Screening tests can find health problems at the earliest stages, when they are easiest to treat. [facility] follows the current, evidence-based guidelines published by the [facility] [place] (USPSTF) when recommending preventive services for our patients. Because we follow these guidelines, sometimes recommendations change over time as research supports it. (For example, mammograms used to be recommended annually. Even though Medicare will still pay for an annual mammogram, the newer guidelines recommend a mammogram every two years for women of average risk.) Of course, you and your doctor may decide to screen more often for some diseases, based on your risk and your health status. Preventive services for you include: - Medicare offers their members a free annual wellness visit, which is time for you and your primary care provider to discuss and plan for your preventive service needs. Take advantage of this benefit every year! 
-All adults over the age of 72 should receive the recommended pneumonia vaccines. Current USPSTF guidelines recommend a series of two vaccines for the best pneumonia protection.  
-All adults should have a flu vaccine yearly and a tetanus vaccine every 10 years. All adults age 61 and older should receive a shingles vaccine once in their lifetime.   
-A bone mass density test is recommended when a woman turns 65 to screen for osteoporosis. This test is only recommended one time, as a screening. Some providers will use this same test as a disease monitoring tool if you already have osteoporosis. -All adults age 38-68 who are overweight should have a diabetes screening test once every three years.  
-Other screening tests and preventive services for persons with diabetes include: an eye exam to screen for diabetic retinopathy, a kidney function test, a foot exam, and stricter control over your cholesterol.  
-Cardiovascular screening for adults with routine risk involves an electrocardiogram (ECG) at intervals determined by your doctor.  
-Colorectal cancer screenings should be done for adults age 54-65 with no increased risk factors for colorectal cancer. There are a number of acceptable methods of screening for this type of cancer. Each test has its own benefits and drawbacks. Discuss with your doctor what is most appropriate for you during your annual wellness visit.  The different tests include: colonoscopy (considered the best screening method), a fecal occult blood test, a fecal DNA test, and sigmoidoscopy. -Breast cancer screenings are recommended every other year for women of normal risk, age 54-69. 
-Cervical cancer screenings for women over age 72 are only recommended with certain risk factors.  
-All adults born between [facility] should be screened once for Hepatitis C. Here is a list of your current Health Maintenance items (your personalized list of preventive services) with a due date: 
Health Maintenance Due Topic Date Due  
 Diabetic Foot Care  04/13/2017 Annamarie Moscoso Eye Exam  05/23/2018 Annamarie Moscoso Annual Well Visit  06/02/2018  Albumin Urine Test  09/13/2018  Hemoglobin A1C    09/14/2018 [facility] SERVICES! Dear Naveen Flores: 
Thank you for requesting a [employer] account. Our records indicate that you already have an active [employer] account. You can access your account anytime at [email or web address] Did you know that you can access your hospital and ER discharge instructions at any time in [employer]? You can also review all of your test results from your hospital stay or ER visit. Additional Information If you have questions, please visit the Frequently Asked Questions section of the [employer] website at [email or web address] Remember, [employer] is NOT to be used for urgent needs. For medical emergencies, dial 911. Now available from your iPhone and Android! Please provide this summary of care documentation to your next provider. Your primary care clinician is listed as Brea Lee. If you have any questions after today's visit, please call [phone].

## 2018-09-11 NOTE — PROGRESS NOTES
Patient's identity verified with two patient identifiers (name and date of birth). Reviewed record in preparation for visit and have obtained necessary documentation. 1. Have you been to the ER, urgent care clinic since your last visit? Hospitalized since your last visit? No 
2. Have you seen or consulted any other health care providers outside of the [address] [place] [address] since your last visit? Include any pap smears or colon screening. No 
 
Chief Complaint Patient presents with  Hypertension 6 month follow up  Diabetes 6 month follow up, foot exam due.  Cholesterol Problem 6 month follow up [facility] Annual Wellness Visit Medicare wellness due. Not fasting. Health Maintenance Due Topic Date Due  
 FOOT EXAM Q1 Patient reports has not had. 04/13/2017  
 EYE EXAM RETINAL OR DILATED Q1 Last done x6mos ago, done 2ce/yr. 05/23/2018  MEDICARE YEARLY EXAM  
due 06/02/2018  MICROALBUMIN Q1  09/13/2018  HEMOGLOBIN A1C Q6M  09/14/2018 Wt Readings from Last 3 Encounters:  
09/11/18 202 lb (91.6 kg) 03/14/18 206 lb (93.4 kg) 12/19/17 210 lb (95.3 kg) Temp Readings from Last 3 Encounters:  
09/11/18 97.5 °F (36.4 °C) (Oral) 03/14/18 97.8 °F (36.6 °C) (Oral)  
11/28/17 96.8 °F (36 °C) (Oral) BP Readings from Last 3 Encounters:  
09/11/18 138/82  
03/14/18 130/82  
12/19/17 132/60 Pulse Readings from Last 3 Encounters:  
09/11/18 86  
03/14/18 89  
12/19/17 99 Depression Screening: 
:  
 
PHQ over the last two weeks 9/11/2018 Little interest or pleasure in doing things Not at all Feeling down, depressed, irritable, or hopeless Not at all Total Score PHQ 2 0 Fall Risk Assessment: 
:  
 
Fall Risk Assessment, last 12 mths 9/11/2018 Able to walk? Yes Fall in past 12 months? Yes Fall with injury? Yes  
Number of falls in past 12 months 1 Fall Risk Score 2 Abuse Screening: 
:  
 
 Abuse Screening Questionnaire 9/11/2018 9/13/2017 6/1/2017 8/12/2015 4/2/2014 Do you ever feel afraid of your partner? N N N N N Are you in a relationship with someone who physically or mentally threatens you? N N N N N Is it safe for you to go home?  Beatris Emery

## 2018-09-12 LAB
ALBUMIN SERPL-MCNC: 4.4 G/DL (ref 3.5–4.7)
ALBUMIN/GLOB SERPL: 1.9 {RATIO} (ref 1.2–2.2)
ALP SERPL-CCNC: 61 IU/L (ref 39–117)
ALT SERPL-CCNC: 20 IU/L (ref 0–32)
AST SERPL-CCNC: 37 IU/L (ref 0–40)
BILIRUB SERPL-MCNC: 0.8 MG/DL (ref 0–1.2)
BUN SERPL-MCNC: 19 MG/DL (ref 8–27)
BUN/CREAT SERPL: 19 (ref 12–28)
CALCIUM SERPL-MCNC: 9.9 MG/DL (ref 8.7–10.3)
CHLORIDE SERPL-SCNC: 103 MMOL/L (ref 96–106)
CO2 SERPL-SCNC: 22 MMOL/L (ref 20–29)
CREAT SERPL-MCNC: 0.98 MG/DL (ref 0.57–1)
EST. AVERAGE GLUCOSE BLD GHB EST-MCNC: 126 MG/DL
GLOBULIN SER CALC-MCNC: 2.3 G/DL (ref 1.5–4.5)
GLUCOSE SERPL-MCNC: 104 MG/DL (ref 65–99)
HBA1C MFR BLD: 6 % (ref 4.8–5.6)
INTERPRETATION: NORMAL
Lab: NORMAL
POTASSIUM SERPL-SCNC: 4.7 MMOL/L (ref 3.5–5.2)
PROT SERPL-MCNC: 6.7 G/DL (ref 6–8.5)
SODIUM SERPL-SCNC: 144 MMOL/L (ref 134–144)
UNABLE TO VOID, 970251: NORMAL

## 2018-10-08 ENCOUNTER — OFFICE VISIT (OUTPATIENT)
Dept: CARDIOLOGY CLINIC | Age: 83
End: 2018-10-08

## 2018-10-08 DIAGNOSIS — Z95.0 CARDIAC PACEMAKER IN SITU: Primary | ICD-10-CM

## 2019-01-22 ENCOUNTER — CLINICAL SUPPORT (OUTPATIENT)
Dept: CARDIOLOGY CLINIC | Age: 84
End: 2019-01-22

## 2019-01-22 DIAGNOSIS — Z95.0 CARDIAC PACEMAKER IN SITU: Primary | ICD-10-CM

## 2019-01-23 NOTE — PROGRESS NOTES
See scanned Pacemaker Report in Chart Review. Advisory device; reprogrammed VVIR until fix is available. Chargeable visit.

## 2019-05-23 ENCOUNTER — HOSPITAL ENCOUNTER (OUTPATIENT)
Dept: LAB | Age: 84
Discharge: HOME | End: 2019-05-23
Payer: MEDICARE

## 2019-05-23 ENCOUNTER — OFFICE VISIT (OUTPATIENT)
Dept: INTERNAL MEDICINE CLINIC | Age: 84
End: 2019-05-23

## 2019-05-23 VITALS
BODY MASS INDEX: 34.66 KG/M2 | TEMPERATURE: 98.2 F | RESPIRATION RATE: 16 BRPM | HEART RATE: 62 BPM | DIASTOLIC BLOOD PRESSURE: 70 MMHG | HEIGHT: 64 IN | WEIGHT: 203 LBS | OXYGEN SATURATION: 95 % | SYSTOLIC BLOOD PRESSURE: 116 MMHG

## 2019-05-23 DIAGNOSIS — I10 BENIGN HYPERTENSION: ICD-10-CM

## 2019-05-23 DIAGNOSIS — E78.2 MIXED HYPERLIPIDEMIA: ICD-10-CM

## 2019-05-23 DIAGNOSIS — E11.21 TYPE 2 DIABETES WITH NEPHROPATHY (HCC): Primary | ICD-10-CM

## 2019-05-23 DIAGNOSIS — Z79.899 ENCOUNTER FOR LONG-TERM (CURRENT) USE OF MEDICATIONS: ICD-10-CM

## 2019-05-23 PROCEDURE — 83036 HEMOGLOBIN GLYCOSYLATED A1C: CPT

## 2019-05-23 PROCEDURE — 85025 COMPLETE CBC W/AUTO DIFF WBC: CPT

## 2019-05-23 PROCEDURE — 80061 LIPID PANEL: CPT

## 2019-05-23 PROCEDURE — 80053 COMPREHEN METABOLIC PANEL: CPT

## 2019-05-23 PROCEDURE — 36415 COLL VENOUS BLD VENIPUNCTURE: CPT

## 2019-05-23 NOTE — PROGRESS NOTES
Subjective:      Gilda Scanlon is a 80 y.o. female who presents today for her hypertension, hyperlipidemia and diabetes mellitus     She declines shingrix. No new concerns today. She is slowing down. Feeling a little more unsteady on her feel. No falls. She still remains active and lives independently. 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     hyperlipidemia E78.5    Hypertension, benign I10    Cardiac pacemaker in situ Z95.0    S/P FERNANDO-BSO Z90.710, Z90.722, Z90.79    Allergy to environmental factors Z91.09    Rotator cuff tear, right M75.101    S/P right cataract extraction Z98.41    History of screening mammography Z92.89    Colon cancer screening Z12.11    Diabetes type 2, controlled ([facility] E11.9    Advance directive discussed with patient Z71.89    Diabetic eye exam ([facility] Z01.00, E11.9    PAT (paroxysmal atrial tachycardia) ([facility]) I47.1    Type 2 diabetes with nephropathy ([facility]) E11.21     Current Outpatient Medications   Medication Sig Dispense Refill    benazepril (LOTENSIN) 10 mg tablet TAKE ONE TABLET BY MOUTH DAILY (GENERIC LOTENSIN) 90 Tab 1    amLODIPine (NORVASC) 2.5 mg tablet TAKE ONE TABLET BY MOUTH DAILY 90 Tab 1    co-enzyme Q-10 (CO Q-10) 100 mg capsule Take 1 Cap by mouth daily. 30 Cap 5    cetirizine (ZYRTEC) 5 mg tablet Take 10 mg by mouth daily.  glucose blood VI test strips (ASCENSIA AUTODISC VI, ONE TOUCH ULTRA TEST VI) strip Use to monitor blood sugar twice daily or as directed. 100 Strip 1    Blood-Glucose Meter monitoring kit Use as directed 1 Kit 0    IBUPROFEN (ADVIL PO) Take  by mouth as needed. Review of Systems    Pertinent items are noted in HPI.      Objective:     Visit Vitals  /70 (BP 1 Location: Left arm, BP Patient Position: Sitting)   Pulse 62   Temp 98.2 °F (36.8 °C) (Oral)   Resp 16   Ht 5' 4\" (1.626 m)   Wt 203 lb (92.1 kg)   LMP  (LMP Unknown)   SpO2 95%   BMI 34.84 kg/m²     General appearance: alert, cooperative, no distress, appears stated age  Head: Normocephalic, without obvious abnormality, atraumatic  Neck: supple, symmetrical, trachea midline, no adenopathy, no carotid bruit and no JVD  Lungs: clear to auscultation bilaterally  Heart: regular rate and rhythm  Extremities: varicose veins noted, no edema    Assessment/Plan:     1. Type 2 diabetes with nephropathy ([facility]  -check labs today. - CBC WITH AUTOMATED DIFF  - METABOLIC PANEL, COMPREHENSIVE  - LIPID PANEL  - HEMOGLOBIN A1C WITH EAG    2. Benign hypertension  -I evaluated and recommended to continue current doses of medications. 3. Mixed hyperlipidemia  -last fasting lipid panel done 3/14/18    4. Encounter for long-term (current) use of medications    5. Instability  -encouraged her to do quad strengthening exercises daily. Orders Placed This Encounter    CBC WITH AUTOMATED DIFF    METABOLIC PANEL, COMPREHENSIVE    LIPID PANEL    HEMOGLOBIN A1C WITH EAG         Follow-up Disposition:     Follow up in 6 months     Return if symptoms worsen or fail to improve. Advised patient to call back or return to office if symptoms worsen/change/persist.     Discussed expected course/resolution/complications of diagnosis in detail with patient. Medication risks/benefits/costs/interactions/alternatives discussed with patient. Patient was given an after visit summary which includes diagnoses, current medications, & vitals. Patient expressed understanding with the diagnosis and plan.

## 2019-05-24 LAB
ALBUMIN SERPL-MCNC: 4.3 G/DL (ref 3.5–4.7)
ALBUMIN/GLOB SERPL: 2.2 {RATIO} (ref 1.2–2.2)
ALP SERPL-CCNC: 52 IU/L (ref 39–117)
ALT SERPL-CCNC: 13 IU/L (ref 0–32)
AST SERPL-CCNC: 15 IU/L (ref 0–40)
BASOPHILS # BLD AUTO: 0 X10E3/UL (ref 0–0.2)
BASOPHILS NFR BLD AUTO: 1 %
BILIRUB SERPL-MCNC: 0.7 MG/DL (ref 0–1.2)
BUN SERPL-MCNC: 16 MG/DL (ref 8–27)
BUN/CREAT SERPL: 19 (ref 12–28)
CALCIUM SERPL-MCNC: 9.7 MG/DL (ref 8.7–10.3)
CHLORIDE SERPL-SCNC: 105 MMOL/L (ref 96–106)
CHOLEST SERPL-MCNC: 210 MG/DL (ref 100–199)
CO2 SERPL-SCNC: 25 MMOL/L (ref 20–29)
CREAT SERPL-MCNC: 0.84 MG/DL (ref 0.57–1)
EOSINOPHIL # BLD AUTO: 0.1 X10E3/UL (ref 0–0.4)
EOSINOPHIL NFR BLD AUTO: 2 %
ERYTHROCYTE [DISTWIDTH] IN BLOOD BY AUTOMATED COUNT: 14 % (ref 12.3–15.4)
EST. AVERAGE GLUCOSE BLD GHB EST-MCNC: 120 MG/DL
GLOBULIN SER CALC-MCNC: 2 G/DL (ref 1.5–4.5)
GLUCOSE SERPL-MCNC: 134 MG/DL (ref 65–99)
HBA1C MFR BLD: 5.8 % (ref 4.8–5.6)
HCT VFR BLD AUTO: 42.5 % (ref 34–46.6)
HDLC SERPL-MCNC: 73 MG/DL
HGB BLD-MCNC: 14.1 G/DL (ref 11.1–15.9)
IMM GRANULOCYTES # BLD AUTO: 0 X10E3/UL (ref 0–0.1)
IMM GRANULOCYTES NFR BLD AUTO: 0 %
INTERPRETATION, 910389: NORMAL
LDLC SERPL CALC-MCNC: 122 MG/DL (ref 0–99)
LYMPHOCYTES # BLD AUTO: 1.7 X10E3/UL (ref 0.7–3.1)
LYMPHOCYTES NFR BLD AUTO: 27 %
Lab: NORMAL
MCH RBC QN AUTO: 30.7 PG (ref 26.6–33)
MCHC RBC AUTO-ENTMCNC: 33.2 G/DL (ref 31.5–35.7)
MCV RBC AUTO: 93 FL (ref 79–97)
MONOCYTES # BLD AUTO: 0.6 X10E3/UL (ref 0.1–0.9)
MONOCYTES NFR BLD AUTO: 10 %
NEUTROPHILS # BLD AUTO: 3.8 X10E3/UL (ref 1.4–7)
NEUTROPHILS NFR BLD AUTO: 60 %
PLATELET # BLD AUTO: 193 X10E3/UL (ref 150–450)
POTASSIUM SERPL-SCNC: 4.2 MMOL/L (ref 3.5–5.2)
PROT SERPL-MCNC: 6.3 G/DL (ref 6–8.5)
RBC # BLD AUTO: 4.59 X10E6/UL (ref 3.77–5.28)
SODIUM SERPL-SCNC: 141 MMOL/L (ref 134–144)
TRIGL SERPL-MCNC: 73 MG/DL (ref 0–149)
VLDLC SERPL CALC-MCNC: 15 MG/DL (ref 5–40)
WBC # BLD AUTO: 6.3 X10E3/UL (ref 3.4–10.8)

## 2019-05-31 RX ORDER — AMLODIPINE BESYLATE 2.5 MG/1
TABLET ORAL
Qty: 90 TAB | Refills: 1 | Status: SHIPPED | OUTPATIENT
Start: 2019-05-31 | End: 2019-08-29 | Stop reason: SDUPTHER

## 2019-05-31 RX ORDER — BENAZEPRIL HYDROCHLORIDE 10 MG/1
TABLET ORAL
Qty: 90 TAB | Refills: 1 | Status: SHIPPED | OUTPATIENT
Start: 2019-05-31 | End: 2019-08-29 | Stop reason: SDUPTHER

## 2019-05-31 NOTE — TELEPHONE ENCOUNTER
Requested Prescriptions     Pending Prescriptions Disp Refills    amLODIPine (NORVASC) 2.5 mg tablet 90 Tab 1    benazepril (LOTENSIN) 10 mg tablet 90 Tab 1     05/2319  No upcoming appt    [id number] [address] [facility]-ER

## 2019-06-07 ENCOUNTER — APPOINTMENT (OUTPATIENT)
Dept: GENERAL RADIOLOGY | Age: 84
End: 2019-06-07
Attending: EMERGENCY MEDICINE
Payer: MEDICARE

## 2019-06-07 ENCOUNTER — APPOINTMENT (OUTPATIENT)
Dept: CT IMAGING | Age: 84
End: 2019-06-07
Attending: EMERGENCY MEDICINE
Payer: MEDICARE

## 2019-06-07 ENCOUNTER — HOSPITAL ENCOUNTER (EMERGENCY)
Age: 84
Discharge: HOME | End: 2019-06-07
Attending: EMERGENCY MEDICINE
Payer: MEDICARE

## 2019-06-07 VITALS
WEIGHT: 207.67 LBS | HEART RATE: 61 BPM | OXYGEN SATURATION: 100 % | DIASTOLIC BLOOD PRESSURE: 62 MMHG | BODY MASS INDEX: 35.65 KG/M2 | SYSTOLIC BLOOD PRESSURE: 121 MMHG | RESPIRATION RATE: 18 BRPM | TEMPERATURE: 97.8 F

## 2019-06-07 DIAGNOSIS — S49.91XA INJURY OF RIGHT UPPER EXTREMITY, INITIAL ENCOUNTER: Primary | ICD-10-CM

## 2019-06-07 PROCEDURE — 72125 CT NECK SPINE W/O DYE: CPT

## 2019-06-07 PROCEDURE — 74011250637 HC RX REV CODE- 250/637: Performed by: EMERGENCY MEDICINE

## 2019-06-07 PROCEDURE — 73080 X-RAY EXAM OF ELBOW: CPT

## 2019-06-07 PROCEDURE — 99284 EMERGENCY DEPT VISIT MOD MDM: CPT

## 2019-06-07 PROCEDURE — 74011250637 HC RX REV CODE- 250/637: Performed by: STUDENT IN AN ORGANIZED HEALTH CARE EDUCATION/TRAINING PROGRAM

## 2019-06-07 PROCEDURE — 70450 CT HEAD/BRAIN W/O DYE: CPT

## 2019-06-07 PROCEDURE — A4565 SLINGS: HCPCS

## 2019-06-07 PROCEDURE — 73030 X-RAY EXAM OF SHOULDER: CPT

## 2019-06-07 RX ORDER — OXYCODONE AND ACETAMINOPHEN 5; 325 MG/1; MG/1
1 TABLET ORAL
Status: COMPLETED | OUTPATIENT
Start: 2019-06-07 | End: 2019-06-07

## 2019-06-07 RX ORDER — DICLOFENAC SODIUM 10 MG/G
2 GEL TOPICAL 4 TIMES DAILY
Qty: 100 G | Refills: 0 | Status: SHIPPED | OUTPATIENT
Start: 2019-06-07 | End: 2020-02-11

## 2019-06-07 RX ORDER — IBUPROFEN 600 MG/1
600 TABLET ORAL
Status: COMPLETED | OUTPATIENT
Start: 2019-06-07 | End: 2019-06-07

## 2019-06-07 RX ORDER — TRAMADOL HYDROCHLORIDE 50 MG/1
50 TABLET ORAL
Qty: 10 TAB | Refills: 0 | Status: SHIPPED | OUTPATIENT
Start: 2019-06-07 | End: 2019-06-10

## 2019-06-07 RX ADMIN — IBUPROFEN 600 MG: 600 TABLET ORAL at 20:38

## 2019-06-07 RX ADMIN — OXYCODONE HYDROCHLORIDE AND ACETAMINOPHEN 1 TABLET: 5; 325 TABLET ORAL at 18:55

## 2019-06-07 NOTE — ED TRIAGE NOTES
Triage note: Pt states she tripped over a shoe in the floor and fell onto the floor landing on her right side. Reports right shoulder and elbow pain. States her elbow hurts the worse. Denies hitting head. Does not take blood thinners.

## 2019-06-08 NOTE — ED PROVIDER NOTES
The history is provided by the patient and a relative. Fall   The accident occurred 3 to 5 hours ago. The fall occurred while standing (tripped over a shoe). She fell from a height of ground level. She landed on hard floor. There was no blood loss. The point of impact was the right shoulder. The pain is present in the right shoulder and right elbow. The pain is moderate. She was not ambulatory at the scene. There was no entrapment after the fall. Associated symptoms include extremity weakness (R arm). Pertinent negatives include no fever, no abdominal pain, no nausea, no vomiting, no headaches, no loss of consciousness and no laceration. The risk factors include being elderly. Associated symptoms comments: R arm pain  . The symptoms are aggravated by use of injured limb and pressure on injury. She has tried NSAIDs for the symptoms. The treatment provided mild relief. 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     Menopause     LMP-?    Rotator cuff tear 8/2011    right    S/P FERNANDO-BSO 3/2/2012       Past Surgical History:   Procedure Laterality Date    CARDIAC SURG PROCEDURE UNLIST  03/08    Pacemaker Battery Replacement    CARDIAC SURG PROCEDURE UNLIST  1996    Pacemaker Implant    CHEST SURGERY PROCEDURE UNLISTED      Pacemaker    ENDOSCOPY, COLON, DIAGNOSTIC  11/18/08    polyp (Brand)    HX HEENT  6/7/10    ELIAZAR O.D.    HX ORTHOPAEDIC  1992    R Elbow Fx - ORIF    HX FERNANDO AND BSO  1975    IN REMVL PERM PM PLS GEN W/REPL PLSE GEN 2 LEAD SYS  8/18/2017              Family History:   Problem Relation Age of Onset    Cancer Mother     Stroke Father        Social History     Socioeconomic History    Marital status:      Spouse name: Not on file    Number of children: Not on file    Years of education: Not on file    Highest education level: Not on file   Occupational History    Not on file   Social Needs    Financial resource strain: Not on file    Food insecurity:     Worry: Not on file     Inability: Not on file    Transportation needs:     Medical: Not on file     Non-medical: Not on file   Tobacco Use    Smoking status: Former Smoker     Last attempt to quit: 2007     Years since quittin.1    Smokeless tobacco: Never Used   Substance and Sexual Activity    Alcohol use: Yes     Alcohol/week: 3.5 - 5.0 oz     Types: 7 - 10 Standard drinks or equivalent per week     Comment: pt states she drinks vodka and burbon everyday    Drug use: No    Sexual activity: Never   Lifestyle    Physical activity:     Days per week: Not on file     Minutes per session: Not on file    Stress: Not on file   Relationships    Social connections:     Talks on phone: Not on file     Gets together: Not on file     Attends [employer] service: Not on file     Active member of club or organization: Not on file     Attends meetings of clubs or organizations: Not on file     Relationship status: Not on file    Intimate partner violence:     Fear of current or ex partner: Not on file     Emotionally abused: Not on file     Physically abused: Not on file     Forced sexual activity: Not on file   Other Topics Concern    Not on file   Social History Narrative    Not on file         ALLERGIES: Ciprofloxacin; Lipitor [atorvastatin]; Mevacor [lovastatin]; Pcn [penicillins]; and Zocor [simvastatin]    Review of Systems   Constitutional: Negative for chills and fever. Respiratory: Negative for cough and shortness of breath. Cardiovascular: Negative for chest pain. Gastrointestinal: Negative for abdominal pain, nausea and vomiting.    Musculoskeletal: Positive for arthralgias (R shoulder, arm, and elbow pain) and extremity weakness (R arm). Negative for back pain and neck pain. Skin: Negative for rash. Neurological: Negative for loss of consciousness, syncope and headaches. All other systems reviewed and are negative. Vitals:    06/07/19 1837   BP: 127/63   Pulse: 66   Resp: 16   Temp: 98.2 °F (36.8 °C)   SpO2: 99%   Weight: 94.2 kg (207 lb 10.8 oz)            Physical Exam   Constitutional: She is oriented to person, place, and time. She appears well-developed. No distress. HENT:   Head: Normocephalic and atraumatic. Eyes: Conjunctivae and EOM are normal.   Neck: Normal range of motion. Neck supple. Cardiovascular: Normal rate, regular rhythm and normal heart sounds. Pulmonary/Chest: Effort normal and breath sounds normal. No respiratory distress. Abdominal: Soft. There is no tenderness. There is no guarding. Musculoskeletal: She exhibits tenderness (mostly over R elbow, no skin tears or open wounds). She exhibits no edema or deformity. Limited ROM 2/2 pain in RUE     Neurological: She is alert and oriented to person, place, and time. She exhibits normal muscle tone. Skin: Skin is warm and dry. No laceration noted. MDM       Procedures    8:40 PM  The patient presented with a complaint of a fall or minor trauma. The patient is now resting comfortably and feels better, is alert and in no distress. The patient has a normal mental status and is neurologically intact. The history, exam, diagnostic testing (if any) and current condition do not demonstrate signs of clinically significant intra-cranial, intra-thoracic, intra-abdominal, or musculoskeletal trauma. The vital signs have been stable. The patient's condition is stable and appropriate for discharge. The patient will pursue further outpatient evaluation with the primary care physician or other designated or consulting physician as indicated in the discharge instructions.

## 2019-06-08 NOTE — ED NOTES
I have reviewed discharge instructions with the patient. The patient verbalized understanding. Pt ambulatory to w/c. Nad. Taken home by family.

## 2019-08-05 ENCOUNTER — OFFICE VISIT (OUTPATIENT)
Dept: CARDIOLOGY CLINIC | Age: 84
End: 2019-08-05

## 2019-08-05 DIAGNOSIS — Z95.0 CARDIAC PACEMAKER IN SITU: Primary | ICD-10-CM

## 2019-10-22 ENCOUNTER — PATIENT OUTREACH (OUTPATIENT)
Dept: INTERNAL MEDICINE CLINIC | Age: 84
End: 2019-10-22

## 2019-10-22 NOTE — LETTER
10/22/2019 2:47 PM 
 
Ms. Gregory Hercules [address] [facility] [phone] Dear Ms. Azra Borden: 
 
I am a RN Ambulatory Care Manager working with Fabrizio Velasco. Part of my job is to follow up with patients who have a chronic disease. I check to see how you are feeling, answer any questions you may have about your health and check to see if you have a follow-up appointment to see your primary care physician. If you have changed your Primary Care Provider, let us know so we can update our records. Otherwise, I am available to help provide education and resources for your needs. I can be reached directly Monday thru Friday at [phone]. Thank you for allowing me to participate in your care! Sincerely, Prasanth Johnson, RN Enclosure(s) [address] [place] [address] Ritesh Directive for [employer]

## 2019-10-22 NOTE — PROGRESS NOTES
Ambulatory Care Management Note    Date/Time:  10/22/2019 2:36 PM    This patient was received as a referral from [address] [place] [address] reviewed with the provider   Patient has ACP at home will bring to next follow up visit on 11/21/19     Ambulatory  contacted patient for discussion and case managements of advance care plan. Summary of patients top problems:   1. Patient is a 80year old female with a medical history of diabetes mellitus, type 2 (diet controlled), AV block (pacemaker) and hyperlipidemia which is not controlled, patient is not on a statin, MD notified. 2. Patient has not advance care plan (ACP) Patient and ACM discussed having an ACP. Patient states she has a living will. ACM encouraged patient to bring her living will to next office visit on 11/21/19. ACM notified MD.  Patient's challenges to self management identified:   medical condition    Medication Management:  good adherence, good understanding    Advance Care Planning:   Does patient have an Advance Directive:  not on file; education provided    [employer], Referrals, and Durable Medical Equipment: n/a    PCP/Specialist follow up:   Future Appointments   Date Time Provider Caren Cotto   11/11/2019  9:30 AM REMOTE, [address] [facility]   11/21/2019 12:20 PM Adilson Odell MD  YULI SCHED   2/11/2020  2:00 PM [address] [place] [address], [address] [facility]   2/11/2020  2:20 PM Elvi Jimenez  [address]      Goals      Prepare patients and caregivers for end of life decisions (ie. need for hospice, pain management, symptom relief, advance directives etc.)      10/22/19  Patient will bring a copy of her ACP to next office visit on 11/21/19. Patient verbalized understanding of all information discussed. Patient has this Nurse Navigators contact information for any further questions, concerns, or needs.

## 2019-11-11 ENCOUNTER — OFFICE VISIT (OUTPATIENT)
Dept: CARDIOLOGY CLINIC | Age: 84
End: 2019-11-11

## 2019-11-11 DIAGNOSIS — Z95.0 CARDIAC PACEMAKER IN SITU: Primary | ICD-10-CM

## 2019-11-21 ENCOUNTER — HOSPITAL ENCOUNTER (OUTPATIENT)
Dept: LAB | Age: 84
Discharge: HOME | End: 2019-11-21
Payer: MEDICARE

## 2019-11-21 ENCOUNTER — OFFICE VISIT (OUTPATIENT)
Dept: INTERNAL MEDICINE CLINIC | Age: 84
End: 2019-11-21

## 2019-11-21 VITALS
HEART RATE: 72 BPM | OXYGEN SATURATION: 97 % | TEMPERATURE: 97.3 F | DIASTOLIC BLOOD PRESSURE: 66 MMHG | BODY MASS INDEX: 35.7 KG/M2 | SYSTOLIC BLOOD PRESSURE: 120 MMHG | RESPIRATION RATE: 16 BRPM | WEIGHT: 208 LBS

## 2019-11-21 DIAGNOSIS — Z13.31 SCREENING FOR DEPRESSION: ICD-10-CM

## 2019-11-21 DIAGNOSIS — Z00.00 MEDICARE ANNUAL WELLNESS VISIT, SUBSEQUENT: Primary | ICD-10-CM

## 2019-11-21 DIAGNOSIS — E11.21 TYPE 2 DIABETES WITH NEPHROPATHY (HCC): ICD-10-CM

## 2019-11-21 DIAGNOSIS — E78.2 MIXED HYPERLIPIDEMIA: ICD-10-CM

## 2019-11-21 DIAGNOSIS — Z79.899 ENCOUNTER FOR LONG-TERM (CURRENT) USE OF MEDICATIONS: ICD-10-CM

## 2019-11-21 DIAGNOSIS — I10 BENIGN HYPERTENSION: ICD-10-CM

## 2019-11-21 DIAGNOSIS — Z13.39 SCREENING FOR ALCOHOLISM: ICD-10-CM

## 2019-11-21 PROCEDURE — 83036 HEMOGLOBIN GLYCOSYLATED A1C: CPT

## 2019-11-21 PROCEDURE — 85025 COMPLETE CBC W/AUTO DIFF WBC: CPT

## 2019-11-21 PROCEDURE — 36415 COLL VENOUS BLD VENIPUNCTURE: CPT

## 2019-11-21 PROCEDURE — 80053 COMPREHEN METABOLIC PANEL: CPT

## 2019-11-21 PROCEDURE — 82043 UR ALBUMIN QUANTITATIVE: CPT

## 2019-11-21 NOTE — PATIENT INSTRUCTIONS
Medicare Wellness Visit, Female The best way to live healthy is to have a lifestyle where you eat a well-balanced diet, exercise regularly, limit alcohol use, and quit all forms of tobacco/nicotine, if applicable. Regular preventive services are another way to keep healthy. Preventive services (vaccines, screening tests, monitoring & exams) can help personalize your care plan, which helps you manage your own care. Screening tests can find health problems at the earliest stages, when they are easiest to treat. [facility] follows the current, evidence-based guidelines published by the [facility] ([facility]) when recommending preventive services for our patients. Because we follow these guidelines, sometimes recommendations change over time as research supports it. (For example, mammograms used to be recommended annually. Even though Medicare will still pay for an annual mammogram, the newer guidelines recommend a mammogram every two years for women of average risk). Of course, you and your doctor may decide to screen more often for some diseases, based on your risk and your co-morbidities (chronic disease you are already diagnosed with). Preventive services for you include: - Medicare offers their members a free annual wellness visit, which is time for you and your primary care provider to discuss and plan for your preventive service needs. Take advantage of this benefit every year! 
-All adults over the age of 72 should receive the recommended pneumonia vaccines. Current USPSTF guidelines recommend a series of two vaccines for the best pneumonia protection.  
-All adults should have a flu vaccine yearly and a tetanus vaccine every 10 years.  
-All adults age 48 and older should receive the shingles vaccines (series of two vaccines). -All adults age 38-68 who are overweight should have a diabetes screening test once every three years. -All adults born between [birth year] and 1965 should be screened once for Hepatitis C. 
-Other screening tests and preventive services for persons with diabetes include: an eye exam to screen for diabetic retinopathy, a kidney function test, a foot exam, and stricter control over your cholesterol.  
-Cardiovascular screening for adults with routine risk involves an electrocardiogram (ECG) at intervals determined by your doctor.  
-Colorectal cancer screenings should be done for adults age 54-65 with no increased risk factors for colorectal cancer. There are a number of acceptable methods of screening for this type of cancer. Each test has its own benefits and drawbacks. Discuss with your doctor what is most appropriate for you during your annual wellness visit. The different tests include: colonoscopy (considered the best screening method), a fecal occult blood test, a fecal DNA test, and sigmoidoscopy. 
 
-A bone mass density test is recommended when a woman turns 65 to screen for osteoporosis. This test is only recommended one time, as a screening. Some providers will use this same test as a disease monitoring tool if you already have osteoporosis. -Breast cancer screenings are recommended every other year for women of normal risk, age 54-69. 
-Cervical cancer screenings for women over age 72 are only recommended with certain risk factors.

## 2019-11-21 NOTE — PROGRESS NOTES
Subjective:      Luca Cooper is a 80 y.o. female who presents today for her hypertension, hyperlipidemia and diabetes mellitus type 2. She is due for her Medicare Wellness Exam.     Glucose is in the 130's fasting in the morning. Has knee pains. Feeling old. Slower. No longer bowling. It got too expensive. 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     hyperlipidemia E78.5    Hypertension, benign I10    Cardiac pacemaker in situ Z95.0    S/P FERNANDO-BSO Z90.710, Z90.722, Z90.79    Allergy to environmental factors Z91.09    Rotator cuff tear, right M75.101    S/P right cataract extraction Z98.41    History of screening mammography Z92.89    Colon cancer screening Z12.11    Diabetes type 2, controlled ([facility] [address] E11.9    Advance directive discussed with patient Z70.80    Diabetic eye exam ([facility]) Z01.00, E11.9    PAT (paroxysmal atrial tachycardia) ([facility]) I47.1    Type 2 diabetes with nephropathy ([facility]) E11.21     Current Outpatient Medications   Medication Sig Dispense Refill    benazepril (LOTENSIN) 10 mg tablet TAKE ONE TABLET BY MOUTH ONE TIME DAILY 90 Tab 1    amLODIPine (NORVASC) 2.5 mg tablet TAKE ONE TABLET BY MOUTH ONE TIME DAILY 90 Tab 1    diclofenac (VOLTAREN) 1 % gel Apply 2 g to affected area four (4) times daily. 100 g 0    IBUPROFEN (ADVIL PO) Take  by mouth as needed.  co-enzyme Q-10 (CO Q-10) 100 mg capsule Take 1 Cap by mouth daily. 30 Cap 5    cetirizine (ZYRTEC) 5 mg tablet Take 10 mg by mouth daily.  glucose blood VI test strips (ASCENSIA AUTODISC VI, ONE TOUCH ULTRA TEST VI) strip Use to monitor blood sugar twice daily or as directed. 100 Strip 1    Blood-Glucose Meter monitoring kit Use as directed 1 Kit 0        Review of Systems    Pertinent items are noted in HPI.      Objective:     Visit Vitals  /66 (BP 1 Location: Left arm, BP Patient Position: Sitting)   Pulse 72 Temp 97.3 °F (36.3 °C) (Oral)   Resp 16   Wt 208 lb (94.3 kg)   LMP  (LMP Unknown)   SpO2 97%   BMI 35.70 kg/m²     General appearance: alert, cooperative, no distress, appears stated age  Head: Normocephalic, without obvious abnormality, atraumatic  Neck: supple, symmetrical, trachea midline, no adenopathy, no carotid bruit and no JVD  Lungs: clear to auscultation bilaterally  Heart: regular rate and rhythm, S1, S2 normal, no murmur, click, rub or gallop  Extremities: varicose veins noted, edema 2+    Assessment/Plan:     1. Medicare annual wellness visit, subsequent    - [place] ANNUAL ALCOHOL SCREEN [address]    2. Screening for alcoholism    - [place] ANNUAL ALCOHOL SCREEN 15 MIN    3. Screening for depression    - DEPRESSION SCREEN ANNUAL    4. Type 2 diabetes with nephropathy ([facility] [address]  -has been trying to maintain diabetes mellitus diet. - CBC WITH AUTOMATED DIFF  - METABOLIC PANEL, COMPREHENSIVE  - HEMOGLOBIN A1C WITH EAG  - MICROALBUMIN, UR, RAND W/ MICROALB/CREAT RATIO    5. Benign hypertension  -I evaluated and recommended to continue current doses of medications. 6. Mixed hyperlipidemia      7. Encounter for long-term (current) use of medications    Orders Placed This Encounter    Depression Screen Annual    CBC WITH AUTOMATED DIFF    METABOLIC PANEL, COMPREHENSIVE    HEMOGLOBIN A1C WITH EAG    MICROALBUMIN, UR, RAND W/ MICROALB/CREAT RATIO    Annual  Alcohol Screen 15 min ()         Follow-up Disposition:     Follow up in 4 months     Return if symptoms worsen or fail to improve. Advised patient to call back or return to office if symptoms worsen/change/persist.     Discussed expected course/resolution/complications of diagnosis in detail with patient. Medication risks/benefits/costs/interactions/alternatives discussed with patient. Patient was given an after visit summary which includes diagnoses, current medications, & vitals.    Patient expressed understanding with the diagnosis and plan. This is the Subsequent Medicare Annual Wellness Exam, performed 12 months or more after the Initial AWV or the last Subsequent AWV    I have reviewed the patient's medical history in detail and updated the computerized patient record. History     Patient Active Problem List   Diagnosis Code    Atrioventricular block, complete ([address] I44.2     hyperlipidemia E78.5    Hypertension, benign I10    Cardiac pacemaker in situ Z95.0    S/P FERNANDO-BSO Z90.710, Z90.722, Z90.79    Allergy to environmental factors Z91.09    Rotator cuff tear, right M75.101    S/P right cataract extraction Z98.41    History of screening mammography Z92.89    Colon cancer screening Z12.11    Diabetes type 2, controlled ([facility] [address] E11.9    Advance directive discussed with patient Z70.80    Diabetic eye exam ([facility] [address] Z01.00, E11.9    PAT (paroxysmal atrial tachycardia) ([facility] I47.1    Type 2 diabetes with nephropathy ([facility] [address] E11.21     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     Menopause     LMP-?    Rotator cuff tear 8/2011    right    S/P FERNANDO-BSO 3/2/2012      Past Surgical History:   Procedure Laterality Date    CARDIAC SURG PROCEDURE UNLIST  03/08    Pacemaker Battery Replacement    CARDIAC SURG PROCEDURE UNLIST  1996    Pacemaker Implant    CHEST SURGERY PROCEDURE UNLISTED      Pacemaker    ENDOSCOPY, COLON, DIAGNOSTIC  11/18/08    polyp (Brand)    HX HEENT  6/7/10    ELIAZAR O.D.    HX ORTHOPAEDIC  1992    R Elbow Fx - ORIF    HX FERNANDO AND BSO  1975    RI REMVL PERM PM PLS GEN W/REPL PLSE GEN 2 LEAD SYS  8/18/2017          Current Outpatient Medications Medication Sig Dispense Refill    benazepril (LOTENSIN) 10 mg tablet TAKE ONE TABLET BY MOUTH ONE TIME DAILY 90 Tab 1    amLODIPine (NORVASC) 2.5 mg tablet TAKE ONE TABLET BY MOUTH ONE TIME DAILY 90 Tab 1    diclofenac (VOLTAREN) 1 % gel Apply 2 g to affected area four (4) times daily. 100 g 0    IBUPROFEN (ADVIL PO) Take  by mouth as needed.  co-enzyme Q-10 (CO Q-10) 100 mg capsule Take 1 Cap by mouth daily. 30 Cap 5    cetirizine (ZYRTEC) 5 mg tablet Take 10 mg by mouth daily.  glucose blood VI test strips (ASCENSIA AUTODISC VI, ONE TOUCH ULTRA TEST VI) strip Use to monitor blood sugar twice daily or as directed. 100 Strip 1    Blood-Glucose Meter monitoring kit Use as directed 1 Kit 0     Allergies   Allergen Reactions    Ciprofloxacin Other (comments)     \"jittery\"    Lipitor [Atorvastatin] Myalgia    Mevacor [Lovastatin] Other (comments)     dyspepsia    Pcn [Penicillins] Hives    Zocor [Simvastatin] Myalgia       Family History   Problem Relation Age of Onset    Cancer Mother     Stroke Father      Social History     Tobacco Use    Smoking status: Former Smoker     Last attempt to quit: 2007     Years since quittin.6    Smokeless tobacco: Never Used   Substance Use Topics    Alcohol use: Yes     Alcohol/week: 5.8 - 8.3 standard drinks     Types: 7 - 10 Standard drinks or equivalent per week     Comment: pt states she drinks vodka and burbon everyday       Depression Risk Factor Screening:     3 most recent PHQ Screens 2019   Little interest or pleasure in doing things Not at all   Feeling down, depressed, irritable, or hopeless Not at all   Total Score PHQ 2 0       Alcohol Risk Factor Screening:   Do you average 1 drink per night or more than 7 drinks a week:  1 drink per night with dinner    On any one occasion in the past three months have you have had more than 3 drinks containing alcohol:  No      Functional Ability and Level of Safety:   Hearing:  The patient wears hearing aids. - both sided    Activities of Daily Living: The home contains: handrails and grab bars  Patient does total self care    Ambulation: with mild difficulty    Fall Risk:  Fall Risk Assessment, last 12 mths 5/23/2019   Able to walk? Yes   Fall in past 12 months? No   Fall with injury? -   Number of falls in past 12 months -   Fall Risk Score -       Abuse Screen:  Patient is not abused    Cognitive Screening   Has your family/caregiver stated any concerns about your memory: no      Patient Care Team   Patient Care Team:  Jamilah Aguilar MD as PCP - General (Internal Medicine)  Jamilah Aguilar MD as PCP - [facility] Empaneled Provider  Asia Tiwari [[id number]] (Cardiology)  Olivier Garrett MD (Ophthalmology)  Louella Goodell, MD (Cardiology)  Dl Saravia RN as Ambulatory Care Manager    Assessment/Plan   Education and counseling provided:  Are appropriate based on today's review and evaluation    Diagnoses and all orders for this visit:    1. Medicare annual wellness visit, subsequent  -     [place] ANNUAL ALCOHOL SCREEN [address]    2.  Screening for alcoholism  -     [place] ANNUAL ALCOHOL SCREEN 15 MIN    3. Screening for depression  -     [facility] Maintenance Due   Topic Date Due    Shingrix Vaccine Age 50> (1 of 2) 05/31/1984    Pneumococcal 65+ years (2 of 2 - PPSV23) 11/15/2017    MICROALBUMIN Q1  09/13/2018    HEMOGLOBIN A1C Q6M  11/23/2019

## 2019-11-21 NOTE — PROGRESS NOTES
Verified name and birth date for privacy precautions. Chart reviewed in preparation for today's visit. Chief Complaint   Patient presents with    Diabetes     follow up, not fasting     Hypertension          Health Maintenance Due   Topic    Shingrix Vaccine Age 49> (1 of 2)    Pneumococcal 65+ years (2 of 2 - PPSV23)    MICROALBUMIN Q1     Influenza Age 5 to Adult     MEDICARE YEARLY EXAM     HEMOGLOBIN A1C Q6M          Wt Readings from Last 3 Encounters:   11/21/19 208 lb (94.3 kg)   06/07/19 207 lb 10.8 oz (94.2 kg)   05/23/19 203 lb (92.1 kg)     Temp Readings from Last 3 Encounters:   11/21/19 97.3 °F (36.3 °C) (Oral)   06/07/19 97.8 °F (36.6 °C)   05/23/19 98.2 °F (36.8 °C) (Oral)     BP Readings from Last 3 Encounters:   11/21/19 120/66   06/07/19 121/62   05/23/19 116/70     Pulse Readings from Last 3 Encounters:   11/21/19 72   06/07/19 61   05/23/19 62         Learning Assessment:  :     Learning Assessment 5/23/2019 3/14/2018 8/12/2015 4/2/2014   PRIMARY LEARNER Patient Patient Patient Patient   HIGHEST LEVEL OF EDUCATION - PRIMARY LEARNER  2 YEARS OF COLLEGE 2 YEARS OF COLLEGE SOME COLLEGE SOME COLLEGE   BARRIERS PRIMARY LEARNER NONE NONE NONE NONE   CO-LEARNER CAREGIVER No No No No   PRIMARY LANGUAGE ENGLISH ENGLISH ENGLISH ENGLISH    NEED - - No No   LEARNER PREFERENCE PRIMARY READING READING DEMONSTRATION READING     - DEMONSTRATION - -   LEARNING SPECIAL TOPICS - - no no   ANSWERED BY patinet  patient patient patient   RELATIONSHIP SELF SELF SELF SELF       Depression Screening:  :     3 most recent PHQ Screens 5/23/2019   Little interest or pleasure in doing things Not at all   Feeling down, depressed, irritable, or hopeless Not at all   Total Score PHQ 2 0       Fall Risk Assessment:  :     Fall Risk Assessment, last 12 mths 5/23/2019   Able to walk? Yes   Fall in past 12 months?  No   Fall with injury? -   Number of falls in past 12 months -   Fall Risk Score -       Abuse Screening:  :     Abuse Screening Questionnaire 5/23/2019 9/11/2018 9/13/2017 6/1/2017 8/12/2015 4/2/2014   Do you ever feel afraid of your partner? N N N N N N   Are you in a relationship with someone who physically or mentally threatens you? N N N N N N   Is it safe for you to go home? Y Y Y Y Y Y       Coordination of Care Questionnaire:  :     1) Have you been to an emergency room, urgent care clinic since your last visit?  yes   Hospitalized since your last visit? no             2) Have you seen or consulted any other health care providers outside of [address] [place] [address] since your last visit? no  (Include any pap smears or colon screenings in this section.)    3) Do you have an Advance Directive on file? no          ------------------------------------------------

## 2019-11-22 LAB
ALBUMIN SERPL-MCNC: 4.6 G/DL (ref 3.5–4.7)
ALBUMIN/CREAT UR: 15.6 MG/G CREAT (ref 0–30)
ALBUMIN/GLOB SERPL: 2.4 {RATIO} (ref 1.2–2.2)
ALP SERPL-CCNC: 63 IU/L (ref 39–117)
ALT SERPL-CCNC: 17 IU/L (ref 0–32)
AST SERPL-CCNC: 18 IU/L (ref 0–40)
BASOPHILS # BLD AUTO: 0 X10E3/UL (ref 0–0.2)
BASOPHILS NFR BLD AUTO: 1 %
BILIRUB SERPL-MCNC: 0.5 MG/DL (ref 0–1.2)
BUN SERPL-MCNC: 19 MG/DL (ref 8–27)
BUN/CREAT SERPL: 22 (ref 12–28)
CALCIUM SERPL-MCNC: 9.6 MG/DL (ref 8.7–10.3)
CHLORIDE SERPL-SCNC: 105 MMOL/L (ref 96–106)
CO2 SERPL-SCNC: 21 MMOL/L (ref 20–29)
CREAT SERPL-MCNC: 0.86 MG/DL (ref 0.57–1)
CREAT UR-MCNC: 204.2 MG/DL
EOSINOPHIL # BLD AUTO: 0.1 X10E3/UL (ref 0–0.4)
EOSINOPHIL NFR BLD AUTO: 2 %
ERYTHROCYTE [DISTWIDTH] IN BLOOD BY AUTOMATED COUNT: 12.1 % (ref 12.3–15.4)
EST. AVERAGE GLUCOSE BLD GHB EST-MCNC: 120 MG/DL
GLOBULIN SER CALC-MCNC: 1.9 G/DL (ref 1.5–4.5)
GLUCOSE SERPL-MCNC: 132 MG/DL (ref 65–99)
HBA1C MFR BLD: 5.8 % (ref 4.8–5.6)
HCT VFR BLD AUTO: 40.8 % (ref 34–46.6)
HGB BLD-MCNC: 14.2 G/DL (ref 11.1–15.9)
IMM GRANULOCYTES # BLD AUTO: 0 X10E3/UL (ref 0–0.1)
IMM GRANULOCYTES NFR BLD AUTO: 0 %
LYMPHOCYTES # BLD AUTO: 1.6 X10E3/UL (ref 0.7–3.1)
LYMPHOCYTES NFR BLD AUTO: 23 %
MCH RBC QN AUTO: 30.9 PG (ref 26.6–33)
MCHC RBC AUTO-ENTMCNC: 34.8 G/DL (ref 31.5–35.7)
MCV RBC AUTO: 89 FL (ref 79–97)
MICROALBUMIN UR-MCNC: 31.9 UG/ML
MONOCYTES # BLD AUTO: 0.7 X10E3/UL (ref 0.1–0.9)
MONOCYTES NFR BLD AUTO: 11 %
NEUTROPHILS # BLD AUTO: 4.3 X10E3/UL (ref 1.4–7)
NEUTROPHILS NFR BLD AUTO: 63 %
PLATELET # BLD AUTO: 190 X10E3/UL (ref 150–450)
POTASSIUM SERPL-SCNC: 4.1 MMOL/L (ref 3.5–5.2)
PROT SERPL-MCNC: 6.5 G/DL (ref 6–8.5)
RBC # BLD AUTO: 4.6 X10E6/UL (ref 3.77–5.28)
SODIUM SERPL-SCNC: 144 MMOL/L (ref 134–144)
WBC # BLD AUTO: 6.8 X10E3/UL (ref 3.4–10.8)

## 2019-11-26 ENCOUNTER — PATIENT OUTREACH (OUTPATIENT)
Dept: INTERNAL MEDICINE CLINIC | Age: 84
End: 2019-11-26

## 2019-11-26 NOTE — PROGRESS NOTES
Ambulatory Care Manager outreached to patient today to offer care management services. Introduction to self and role of care manager provided. Patient declined care management services at this time. No follow up call scheduled at this time. Patient has Ambulatory Care Manager's contact number for for any questions or concerns. Goals Addressed                 This Visit's Progress     COMPLETED: Prepare patients and caregivers for end of life decisions (ie. need for hospice, pain management, symptom relief, advance directives etc.)        10/22/19  Patient will bring a copy of her ACP to next office visit on 11/21/19. [facility] mailed patient the [place] [facility] [employer]. 11/26/19  Patient had AWV with PCP on 11/21/19, no ACP was discussed. Spoke to patient today. Patient states she received the [place] eileen [facility]out in the mail. Patient states she understands documentation and need no further help from [facility].

## 2020-02-11 ENCOUNTER — CLINICAL SUPPORT (OUTPATIENT)
Dept: CARDIOLOGY CLINIC | Age: 85
End: 2020-02-11

## 2020-02-11 ENCOUNTER — OFFICE VISIT (OUTPATIENT)
Dept: CARDIOLOGY CLINIC | Age: 85
End: 2020-02-11

## 2020-02-11 VITALS
SYSTOLIC BLOOD PRESSURE: 120 MMHG | HEART RATE: 78 BPM | OXYGEN SATURATION: 98 % | DIASTOLIC BLOOD PRESSURE: 66 MMHG | RESPIRATION RATE: 14 BRPM | WEIGHT: 207 LBS | HEIGHT: 64 IN | BODY MASS INDEX: 35.34 KG/M2

## 2020-02-11 DIAGNOSIS — Z95.0 CARDIAC PACEMAKER IN SITU: Primary | ICD-10-CM

## 2020-02-11 DIAGNOSIS — I44.2 ATRIOVENTRICULAR BLOCK, COMPLETE (HCC): ICD-10-CM

## 2020-02-11 DIAGNOSIS — I47.29 NSVT (NONSUSTAINED VENTRICULAR TACHYCARDIA): ICD-10-CM

## 2020-02-11 NOTE — PATIENT INSTRUCTIONS
You will be scheduled for a Nuclear Stress Test after your appointment today. For Nuclear Stress Test:  Wear comfortable clothing (shorts or pants with a shirt or blouse- no underwire bras) and walking or athletic shoes. DO NOT eat or drink anything, except water, for at least 2 hours prior to your appointment. AVOID tobacco products for at least 6 hours prior to your test.    DO NOT eat or drink anything containing caffeine, including but not limited to the following: chocolate, regular and decaffeinated coffee, soft drinks, or tea for at least 24 hours prior to your test.    Do take your scheduled medications prior to your test.     You will need to inform our office if you are pregnant, nursing, or think you may be pregnant. Your test will be performed on a 1 OR 2 day protocol. This is determined by your height, weight, and other risk factors. For a 2 day test, please allow for 2 hours in the office each day. For a 1 day test, please allow for 4 hours in the office that day. The radioactive isotope used for your testing is different from any of the dyes that are commonly used in x-ray procedures, and is ordered specially for your test. Please call to cancel or reschedule your appointment at least 24 hours prior to your scheduled appointment to avoid being billed for the expensive isotope. You will need to follow up in clinic with Dr. Ted Magallanes in 1 year.

## 2020-02-11 NOTE — PROGRESS NOTES
Cardiac Electrophysiology Office Note     Subjective:      Clif Tabor is a 80 y.o. patient who is seen for evaluation of dual chamber pacemaker. It is [employer] and has replaced in August 2017  She is pacer dependent  The pacemaker lead is from [address] and RV pacing impedance is elevated. Pacing threshold is normal.   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     hyperlipidemia E78.5    Hypertension, benign I10    Cardiac pacemaker in situ Z95.0    S/P FERNANDO-BSO Z90.710, Z90.722, Z90.79    Allergy to environmental factors Z91.09    Rotator cuff tear, right M75.101    S/P right cataract extraction Z98.41    History of screening mammography Z92.89    Colon cancer screening Z12.11    Diabetes type 2, controlled ([facility] E11.9    Advance directive discussed with patient Z70.80    [facility] Z01.00, E11.9    PAT (paroxysmal atrial tachycardia) ([facility]) I47.1    Type 2 diabetes with nephropathy ([facility]) E11.21     Current Outpatient Medications   Medication Sig Dispense Refill    benazepril (LOTENSIN) 10 mg tablet TAKE ONE TABLET BY MOUTH ONE TIME DAILY 90 Tab 1    amLODIPine (NORVASC) 2.5 mg tablet TAKE ONE TABLET BY MOUTH ONE TIME DAILY 90 Tab 1    glucose blood VI test strips (ASCENSIA AUTODISC VI, ONE TOUCH ULTRA TEST VI) strip Use to monitor blood sugar twice daily or as directed. 100 Strip 1    Blood-Glucose Meter monitoring kit Use as directed 1 Kit 0    IBUPROFEN (ADVIL PO) Take  by mouth as needed.  cetirizine (ZYRTEC) 5 mg tablet Take 10 mg by mouth daily.        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  Menopause     LMP-?    Rotator cuff tear 2011    right    S/P FERNANDO-BSO 3/2/2012     Past Surgical History:   Procedure Laterality Date    CARDIAC SURG PROCEDURE UNLIST      Pacemaker Battery Replacement    CARDIAC SURG PROCEDURE UNLIST      Pacemaker Implant    CHEST SURGERY PROCEDURE UNLISTED      Pacemaker    ENDOSCOPY, COLON, DIAGNOSTIC  08    polyp (Brand)    HX HEENT  6/7/10    ELIAZAR O.D.    HX ORTHOPAEDIC      R Elbow Fx - ORIF    HX FERNANDO AND BSO      RI REMVL PERM PM PLS GEN W/REPL PLSE GEN 2 LEAD SYS  2017          Family History   Problem Relation Age of Onset    Cancer Mother     Stroke Father      Social History     Tobacco Use    Smoking status: Former Smoker     Last attempt to quit: 2007     Years since quittin.8    Smokeless tobacco: Never Used   Substance Use Topics    Alcohol use: Yes     Alcohol/week: 5.8 - 8.3 standard drinks     Types: 7 - 10 Standard drinks or equivalent per week     Comment: pt states she drinks vodka and burbon everyday        Review of Systems:   Constitutional: Negative for fever, chills, weight loss,malaise/fatigue. HEENT: Negative for nosebleeds, vision changes. Respiratory: Negative for cough, hemoptysis  Cardiovascular: Negative for chest pain, palpitations, orthopnea, claudication, leg swelling, syncope, and PND. Gastrointestinal: Negative for nausea, vomiting, diarrhea, blood in stool and melena. Genitourinary: Negative for dysuria, and hematuria. Musculoskeletal: Negative for myalgias, + arthralgia. Skin: Negative for rash. Heme: Does not bleed or bruise easily. Neurological: Negative for speech change and focal weakness     Objective:     Visit Vitals  /66 (BP 1 Location: Left arm, BP Patient Position: Sitting)   Pulse 78   Resp 14   Ht 5' 4\" (1.626 m)   Wt 207 lb (93.9 kg)   LMP  (LMP Unknown)   SpO2 98%   BMI 35.53 kg/m²      Physical Exam:   Constitutional: well-developed and well-nourished. No respiratory distress. Head: Normocephalic and atraumatic. Eyes: Pupils are equal, round  ENT: hearing normal  Neck: supple. No JVD present. Cardiovascular: Normal rate, regular rhythm. Exam reveals no gallop and no friction rub. No murmur heard. Pulmonary/Chest: Effort normal and breath sounds normal. No wheezes. Abdominal: Soft + obesity  Musculoskeletal: no edema. Neurological: alert, oriented. Skin: Skin is warm and dry right sided pacer with healed incision  Psychiatric: normal mood and affect. Behavior is normal. Judgment and thought content normal.         Assessment/Plan:       ICD-10-CM ICD-9-CM    1. Cardiac pacemaker in situ Z95.0 V45.01    2. NSVT (nonsustained ventricular tachycardia) ([facility]) I47.2 427.1 NUCLEAR CARDIAC STRESS TEST   3.  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 426.0         The patient's pacemaker is checked and functions well although she had PAT and no confirmed AFIB but it is VVIR programming so will recheck underlying rhythm when she comes in for nuclear stress test and we will reprogram  She is recommended nuclear stress test because of NSVT 7 seconds 1/2020  She had hx of PAT but dependent on RV pacing so she is asymptomatic  Future Appointments   Date Time Provider Caren Cotto   2/26/2020 12:00 PM NUCLEAR, [address]   5/21/2020 12:20 PM Kye Ornelas MD [facility]   6/10/2020  2:30 PM [address], [address]   9/14/2020 11:30 AM REMOTE1, [address]   12/21/2020 11:15 AM REMOTE1, [address]   [facility]2021  [facility]   3/23/2021  2:20 PM Dez Canales  [address]       Thank you for involving me in this patient's care and please call with further concerns or questions. Padmini Liao M.D.   Electrophysiology/Cardiology  [facility] [place]  [facility], [address], [place] [address]  [address]  [phone]

## 2020-02-25 ENCOUNTER — TELEPHONE (OUTPATIENT)
Dept: CARDIOLOGY CLINIC | Age: 85
End: 2020-02-25

## 2020-02-25 NOTE — TELEPHONE ENCOUNTER
Patient would like for you to call her back because she don't know why she have to take the nuclear test [phone]

## 2020-02-25 NOTE — TELEPHONE ENCOUNTER
Verified patient with two types of identifiers. Patient states that she does not want to have the nuclear stress test. Explained to patient the reason why Dr. Sofia Sahu ordered test. Patient verified understand but still does not want to proceed. Notified patient we will continue to monitor her romtes and if more NSVT is seen we can readdress Edna. Patient verbalized understanding and will call with any other questions.

## 2020-05-05 ENCOUNTER — TELEPHONE (OUTPATIENT)
Dept: INTERNAL MEDICINE CLINIC | Age: 85
End: 2020-05-05

## 2020-05-05 RX ORDER — INSULIN PUMP SYRINGE, 3 ML
EACH MISCELLANEOUS
Qty: 1 KIT | Refills: 0 | Status: SHIPPED | OUTPATIENT
Start: 2020-05-05

## 2020-05-05 NOTE — TELEPHONE ENCOUNTER
Pt says her machine for diabetes is not working and wanted to know how she would go about getting a new one.

## 2020-05-15 RX ORDER — INSULIN PUMP SYRINGE, 3 ML
EACH MISCELLANEOUS
Qty: 1 KIT | Refills: 0 | Status: SHIPPED | OUTPATIENT
Start: 2020-05-15

## 2020-05-15 RX ORDER — LANCETS
EACH MISCELLANEOUS
Qty: 100 EACH | Refills: 11 | Status: SHIPPED | OUTPATIENT
Start: 2020-05-15

## 2020-05-15 NOTE — TELEPHONE ENCOUNTER
Pharmacy needs a specific brand for the glucose monitor, and patient needs test strips and lancets for the monitor prescribed. Please send new scripts.

## 2020-05-21 ENCOUNTER — VIRTUAL VISIT (OUTPATIENT)
Dept: INTERNAL MEDICINE CLINIC | Age: 85
End: 2020-05-21

## 2020-05-21 DIAGNOSIS — I10 BENIGN HYPERTENSION: ICD-10-CM

## 2020-05-21 DIAGNOSIS — E78.2 MIXED HYPERLIPIDEMIA: ICD-10-CM

## 2020-05-21 DIAGNOSIS — E11.21 TYPE 2 DIABETES WITH NEPHROPATHY (HCC): Primary | ICD-10-CM

## 2020-05-21 DIAGNOSIS — Z79.899 ENCOUNTER FOR LONG-TERM (CURRENT) USE OF MEDICATIONS: ICD-10-CM

## 2020-05-21 RX ORDER — BENAZEPRIL HYDROCHLORIDE 10 MG/1
TABLET ORAL
Qty: 90 TAB | Refills: 1 | Status: SHIPPED | OUTPATIENT
Start: 2020-05-21 | End: 2020-06-02

## 2020-05-21 RX ORDER — AMLODIPINE BESYLATE 2.5 MG/1
TABLET ORAL
Qty: 90 TAB | Refills: 1 | Status: SHIPPED | OUTPATIENT
Start: 2020-05-21 | End: 2020-06-02

## 2020-05-21 NOTE — PROGRESS NOTES
Bk López is a 80 y.o. female who was seen by synchronous (real-time) audio-video technology on 5/21/2020. She confirmed that, for purposes of billing, this is a virtual visit with her provider for which we will submit a claim for reimbursement to her insurance company. She is aware that she will be responsible for any copays, coinsurance amounts or other amounts not covered by her insurance company. Do you accept - YES    This visit was completed was completed virtually using [employer]      Subjective:   Bk López was seen for follow up of her hypertension, hyperlipidemia and diabetes type 2.     -she states that she hasn't been out since march 17, 2020. Her children have been bringing her groceries to her.    -she is walking her dog daily.    -she just got a new glucometer last week. Her old one was not longer functioning. She hasn't checked her glucose for sometime.    -she denies polyuria, polydipsia, nocturia, nausea/vomiting, bowel changes, chest pain, syncope, dyspnea or lightheadedness.     -she is requesting referral to podiatrist for foot care. Having trouble clipping her nails. Prior to Admission medications    Medication Sig Start Date End Date Taking? Authorizing Provider   Blood-Glucose Meter monitoring kit One touch ultra. Use to test blood sugars once daily. DX:E11.21 5/15/20  Yes Ronda Mays MD   lancets misc One touch ultra. Use to test blood sugars once daily. DX:E11.21 5/15/20  Yes Ronda Mays MD   glucose blood VI test strips (blood glucose test) strip One touch ultra. Use to test blood sugars once daily.  DX:E11.21 5/15/20  Yes Ronda Mays MD   Blood-Glucose Meter monitoring kit Use daily as directed 5/5/20  Yes Ronda Mays MD   Blood-Glucose Meter monitoring kit Use as directed 5/5/20  Yes Du, [place] [facility] FLOYD   benazepril (LOTENSIN) 10 mg tablet TAKE ONE TABLET BY MOUTH ONE TIME DAILY 8/29/19  Yes Chelle Ingram NP   amLODIPine (NORVASC) 2.5 mg tablet TAKE ONE TABLET BY MOUTH ONE TIME DAILY 8/29/19  Yes Eric LY, NP   glucose blood VI test strips (ASCENSIA AUTODISC VI, ONE TOUCH ULTRA TEST VI) strip Use to monitor blood sugar twice daily or as directed. 12/1/17  Yes Kelechi Looney MD   IBUPROFEN (ADVIL PO) Take  by mouth as needed. Yes Provider, Historical   cetirizine (ZYRTEC) 5 mg tablet Take 10 mg by mouth daily.    Yes Provider, Historical     Allergies   Allergen Reactions    Ciprofloxacin Other (comments)     \"jittery\"    Lipitor [Atorvastatin] Myalgia    Mevacor [Lovastatin] Other (comments)     dyspepsia    Pcn [Penicillins] Hives    Zocor [Simvastatin] Myalgia       Patient Active Problem List   Diagnosis Code    [facility] I44.2     hyperlipidemia E78.5    Hypertension, benign I10    Cardiac pacemaker in situ Z95.0    S/P FERNANDO-BSO Z90.710, Z90.722, Z90.79    Allergy to environmental factors Z91.09    Rotator cuff tear, right M75.101    S/P right cataract extraction Z98.41    History of screening mammography Z92.89    Colon cancer screening Z12.11    Diabetes type 2, controlled ([facility] E11.9    Advance directive discussed with patient Z70.80    [facility] exam [facility] Z01.00, E11.9    PAT (paroxysmal atrial tachycardia) ([facility]) I47.1    Type 2 diabetes with nephropathy ([facility]) E11.21          ROS - per HPI      Objective:     General: alert, cooperative, no distress   Mental  status: pe mental status_general use: normal mood, behavior, speech, dress, motor activity, and thought processes, able to follow commands   Eyes: EOM intact, normal sclera   Mouth: not examined   Neck: no visualized mass   Resp: PULM - obs findings: normal effort and no respiratory distress   Neuro: neuro - obs: no gross deficits   Musculoskeletal: normal ROM of neck   Skin: skin exam: no discoloration or lesions of concern on visible areas   Psychiatric: normal affect, no hallucinations       Assessment & Plan:   Diagnoses and all orders for this visit: 1. Type 2 diabetes with nephropathy ([facility]    2. Benign hypertension    3. Mixed hyperlipidemia    4. Encounter for long-term (current) use of medications    Other orders  -     amLODIPine (NORVASC) 2.5 mg tablet; TAKE ONE TABLET BY MOUTH ONE TIME DAILY  -     benazepriL (LOTENSIN) 10 mg tablet; TAKE ONE TABLET BY MOUTH ONE TIME DAILY    Plan:  -appears stable. Encouraged her to check her glucose again periodically. -no medication adjustments at this time. -referred patient to [address] [place] [address] for diabetic foot care. follow up 3-4 months      CPT Codes 49458-46169 for Established Patients may apply to this Telehealth Visit  Time-based coding, delete if not needed: I spent at least 25 minutes with this established patient, and >50% of the time was spent counseling and/or coordinating care regarding hypertension, hyperlipidemia and diabetes type 2. Due to this being a TeleHealth evaluation, many elements of the physical examination are unable to be assessed. Pursuant to the emergency declaration under the [address] [place] [address], [facility] waiver authority and the [employer] Act, this Virtual  Visit was conducted, with patient's consent, to reduce the patient's risk of exposure to COVID-19 and provide continuity of care for an established patient. Services were provided through a video synchronous discussion virtually to substitute for in-person clinic visit. We discussed the expected course, resolution and complications of the diagnosis(es) in detail. Medication risks, benefits, costs, interactions, and alternatives were discussed as indicated. I advised her to contact the office if her condition worsens, changes or fails to improve as anticipated. She expressed understanding with the diagnosis(es) and plan.      Pema Fulton MD

## 2020-06-02 RX ORDER — BENAZEPRIL HYDROCHLORIDE 10 MG/1
TABLET ORAL
Qty: 90 TAB | Refills: 1 | Status: SHIPPED | OUTPATIENT
Start: 2020-06-02 | End: 2021-05-20

## 2020-06-02 RX ORDER — AMLODIPINE BESYLATE 2.5 MG/1
TABLET ORAL
Qty: 90 TAB | Refills: 1 | Status: SHIPPED | OUTPATIENT
Start: 2020-06-02 | End: 2021-05-20

## 2020-06-09 ENCOUNTER — OFFICE VISIT (OUTPATIENT)
Dept: CARDIOLOGY CLINIC | Age: 85
End: 2020-06-09

## 2020-06-09 DIAGNOSIS — Z95.0 CARDIAC PACEMAKER IN SITU: Primary | ICD-10-CM

## 2020-06-29 ENCOUNTER — VIRTUAL VISIT (OUTPATIENT)
Dept: PRIMARY CARE CLINIC | Age: 85
End: 2020-06-29
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
## 2020-06-29 DIAGNOSIS — S60.872A: ICD-10-CM

## 2020-06-29 RX ORDER — CLINDAMYCIN HYDROCHLORIDE 300 MG/1
300 CAPSULE ORAL 3 TIMES DAILY
Qty: 15 CAP | Refills: 0 | Status: SHIPPED | OUTPATIENT
Start: 2020-06-29 | End: 2020-07-04

## 2020-06-29 NOTE — PROGRESS NOTES
Bethany Ch is a 80 y.o. female who was seen by synchronous (real-time) audio-video technology on 6/29/2020. Consent: Bethany Ch, who was seen by synchronous (real-time) audio-video technology, and/or her healthcare decision maker, is aware that this patient-initiated, Telehealth encounter on 6/29/2020 is a billable service, with coverage as determined by her insurance carrier. She is aware that she may receive a bill and has provided verbal consent to proceed: Yes. Assessment & Plan:   Diagnoses and all orders for this visit:    1. Dog bite, initial encounter  -     clindamycin (CLEOCIN) 300 mg capsule; Take 1 Cap by mouth three (3) times daily for 5 days. Asked pt to take pro biotics empirically. Call back if the swelling dos not improve or if she develops diarrhea or vaginal yeast infection. I spent at least 15  minutes on this visit with this established patient. Subjective:   Bethany Ch is a 80 y.o. female who was seen for No chief complaint on file. Prior to Admission medications    Medication Sig Start Date End Date Taking? Authorizing Provider   amLODIPine (NORVASC) 2.5 mg tablet TAKE ONE TABLET BY MOUTH ONE TIME DAILY 6/2/20   Andria Leiva MD   benazepriL (LOTENSIN) 10 mg tablet TAKE ONE TABLET BY MOUTH ONE TIME DAILY 6/2/20   Andria Leiva MD   Blood-Glucose Meter monitoring kit One touch ultra. Use to test blood sugars once daily. DX:E11.21 5/15/20   Andria Leiva MD   lancets misc One touch ultra. Use to test blood sugars once daily. DX:E11.21 5/15/20   Andria Leiva MD   glucose blood VI test strips (blood glucose test) strip One touch ultra. Use to test blood sugars once daily.  DX:E11.21 5/15/20   Andria Leiva MD   Blood-Glucose Meter monitoring kit Use daily as directed 5/5/20   Andria Leiva MD   Blood-Glucose Meter monitoring kit Use as directed 5/5/20   Andria Leiva MD   glucose blood VI test strips (ASCENSIA AUTODISC VI, ONE TOUCH ULTRA TEST VI) strip Use to monitor blood sugar twice daily or as directed. 12/1/17   Cipriano Mckeon MD   IBUPROFEN (ADVIL PO) Take  by mouth as needed. Provider, Historical   cetirizine (ZYRTEC) 5 mg tablet Take 10 mg by mouth daily. Provider, Historical     Allergies   Allergen Reactions    Ciprofloxacin Other (comments)     \"jittery\"    Lipitor [Atorvastatin] Myalgia    Mevacor [Lovastatin] Other (comments)     dyspepsia    Pcn [Penicillins] Hives    Zocor [Simvastatin] Myalgia       History. Pt was accompanied by daughter during the tel visit. Pt reports that her personal dog but her on the left wrist on Saturday. She has two bite marks on the dorsum of left wrist.  Denies fever or chills. She does have some swelling on the dorsum that extends to her fingers middle and ring finger distally. She says she is not in pain. However she does take aleve , 2 tabs daily for arthritis. Pt feels okay. Dog is fine and has been vaccinated for rabies. Pt had tetanus vaccine in 2015. Pt is allergic to PCN.     ROS    Objective:   Vital Signs: (As obtained by patient/caregiver at home)  Visit Vitals  LMP  (LMP Unknown)        [INSTRUCTIONS:  \"[x]\" Indicates a positive item  \"[]\" Indicates a negative item  -- DELETE ALL ITEMS NOT EXAMINED]    Constitutional: [x] Appears well-developed and well-nourished [x] No apparent distress      [] Abnormal -     Mental status: [x] Alert and awake  [x] Oriented to person/place/time [x] Able to follow commands    [] Abnormal -     Eyes:   EOM    [x]  Normal    [] Abnormal -   Sclera  [x]  Normal    [] Abnormal -          Discharge [x]  None visible   [] Abnormal -     HENT: [x] Normocephalic, atraumatic  [] Abnormal -   [x] Mouth/Throat: Mucous membranes are moist    External Ears [x] Normal  [] Abnormal -    Neck: [x] No visualized mass [] Abnormal -     Pulmonary/Chest: [x] Respiratory effort normal   [x] No visualized signs of difficulty breathing or respiratory distress        [] Abnormal - Musculoskeletal:   [x] Normal gait with no signs of ataxia         [x] Normal range of motion of neck        [] Abnormal -     Neurological:        [x] No Facial Asymmetry (Cranial nerve 7 motor function) (limited exam due to video visit)          [x] No gaze palsy        [] Abnormal -          Skin:        [x] No significant exanthematous lesions or discoloration noted on facial skin         [] Abnormal - mild pufffiness and erythema on the dorsum of the wrist with some extension distally to the middle and ring finger. upto the base of the finger. Psychiatric:       [x] Normal Affect [] Abnormal -        [x] No Hallucinations    Other pertinent observable physical exam findings:-        We discussed the expected course, resolution and complications of the diagnosis(es) in detail. Medication risks, benefits, costs, interactions, and alternatives were discussed as indicated. I advised her to contact the office if her condition worsens, changes or fails to improve as anticipated. She expressed understanding with the diagnosis(es) and plan. Valeriano Valladares is a 80 y.o. female who was evaluated by a video visit encounter for concerns as above. Patient identification was verified prior to start of the visit. A caregiver was present when appropriate. Due to this being a TeleHealth encounter (During AOX-36 public health emergency), evaluation of the following organ systems was limited: Vitals/Constitutional/EENT/Resp/CV/GI//MS/Neuro/Skin/Heme-Lymph-Imm. Pursuant to the emergency declaration under the [facility], [facility] waiver authority and the [employer] Act, this Virtual  Visit was conducted, with patient's (and/or legal guardian's) consent, to reduce the patient's risk of exposure to COVID-19 and provide necessary medical care.      Services were provided through a video synchronous discussion virtually to substitute for in-person clinic visit. Patient and provider were located at their individual homes.       Nakul Sharpe MD

## 2020-10-01 ENCOUNTER — OFFICE VISIT (OUTPATIENT)
Dept: INTERNAL MEDICINE CLINIC | Age: 85
End: 2020-10-01
Payer: MEDICARE

## 2020-10-01 ENCOUNTER — HOSPITAL ENCOUNTER (OUTPATIENT)
Dept: LAB | Age: 85
Discharge: HOME | End: 2020-10-01
Payer: MEDICARE

## 2020-10-01 VITALS
BODY MASS INDEX: 35.53 KG/M2 | HEART RATE: 65 BPM | SYSTOLIC BLOOD PRESSURE: 120 MMHG | OXYGEN SATURATION: 94 % | HEIGHT: 64 IN | DIASTOLIC BLOOD PRESSURE: 68 MMHG | RESPIRATION RATE: 18 BRPM

## 2020-10-01 DIAGNOSIS — E11.21 TYPE 2 DIABETES WITH NEPHROPATHY (HCC): ICD-10-CM

## 2020-10-01 DIAGNOSIS — N30.90 BLADDER INFECTION: Primary | ICD-10-CM

## 2020-10-01 DIAGNOSIS — E78.5 HYPERLIPIDEMIA, UNSPECIFIED HYPERLIPIDEMIA TYPE: ICD-10-CM

## 2020-10-01 DIAGNOSIS — I10 ESSENTIAL HYPERTENSION, BENIGN: ICD-10-CM

## 2020-10-01 LAB
BILIRUB UR QL STRIP: NORMAL
GLUCOSE UR-MCNC: NEGATIVE MG/DL
KETONES P FAST UR STRIP-MCNC: NORMAL MG/DL
PH UR STRIP: 6 [PH] (ref 4.6–8)
PROT UR QL STRIP: NORMAL
SP GR UR STRIP: 1.02 (ref 1–1.03)
UA UROBILINOGEN AMB POC: NORMAL (ref 0.2–1)
URINALYSIS CLARITY POC: NORMAL
URINALYSIS COLOR POC: NORMAL
URINE BLOOD POC: NORMAL
URINE LEUKOCYTES POC: NORMAL
URINE NITRITES POC: NEGATIVE

## 2020-10-01 PROCEDURE — 81003 URINALYSIS AUTO W/O SCOPE: CPT | Performed by: NURSE PRACTITIONER

## 2020-10-01 PROCEDURE — G0463 HOSPITAL OUTPT CLINIC VISIT: HCPCS | Performed by: NURSE PRACTITIONER

## 2020-10-01 PROCEDURE — 81001 URINALYSIS AUTO W/SCOPE: CPT

## 2020-10-01 PROCEDURE — 87186 SC STD MICRODIL/AGAR DIL: CPT

## 2020-10-01 PROCEDURE — 1090F PRES/ABSN URINE INCON ASSESS: CPT | Performed by: NURSE PRACTITIONER

## 2020-10-01 PROCEDURE — 87086 URINE CULTURE/COLONY COUNT: CPT

## 2020-10-01 PROCEDURE — 1101F PT FALLS ASSESS-DOCD LE1/YR: CPT | Performed by: NURSE PRACTITIONER

## 2020-10-01 PROCEDURE — G8427 DOCREV CUR MEDS BY ELIG CLIN: HCPCS | Performed by: NURSE PRACTITIONER

## 2020-10-01 PROCEDURE — G8417 CALC BMI ABV UP PARAM F/U: HCPCS | Performed by: NURSE PRACTITIONER

## 2020-10-01 PROCEDURE — G8432 DEP SCR NOT DOC, RNG: HCPCS | Performed by: NURSE PRACTITIONER

## 2020-10-01 PROCEDURE — 99214 OFFICE O/P EST MOD 30 MIN: CPT | Performed by: NURSE PRACTITIONER

## 2020-10-01 PROCEDURE — G8536 NO DOC ELDER MAL SCRN: HCPCS | Performed by: NURSE PRACTITIONER

## 2020-10-01 RX ORDER — CEPHALEXIN 250 MG/1
250 CAPSULE ORAL 3 TIMES DAILY
Qty: 15 CAP | Refills: 0 | Status: SHIPPED | OUTPATIENT
Start: 2020-10-01 | End: 2020-10-06

## 2020-10-01 NOTE — PROGRESS NOTES
79 yo female reports 3 days of difficulty urinating and low back discomfort. No fever. Reports adequate hydration. [place], but did not swim. She drank well water there. She wears pads and \"they stay wet, but I change them frequently\". She is also due for her 6 mo f/u of HTN and DM. Blood sugar checks at home are not often because she finds it hard to use. Sleep is good, bowel fx is stable, weight is stable. PE: WNWD WF is alert   BP = 120/68   Heart - RRR   Lungs - clear   LEs - no edema  Urinalysis: Urine is dark yellow; 1+ leuks , 2+ blood    Imp: UTI   HTN   DM    Plan: Cephalexin 250 TID for 5 days   CBC, CMP, A1c, Lipid panel - she will have done in next few weeks   She will check with pharmacy about a different Glucometer   See Dr. Maira Ferreira in 6 mos with Medicare Wellness then  _______________________________  Expected course of current diagnosed problem(s) as well as expected progression and possible complications, and desired follow up with provider are discussed with patient. Patient is encouraged to be back in touch with any questions or concerns. Patient expresses understanding of plan of care. Patient is given AVS which includes diagnoses, current medications, vitals.

## 2020-10-06 ENCOUNTER — TELEPHONE (OUTPATIENT)
Dept: INTERNAL MEDICINE CLINIC | Age: 85
End: 2020-10-06

## 2020-10-06 LAB
APPEARANCE UR: ABNORMAL
BACTERIA #/AREA URNS HPF: ABNORMAL /[HPF]
BACTERIA UR CULT: ABNORMAL
BACTERIA UR CULT: ABNORMAL
BILIRUB UR QL STRIP: NEGATIVE
CASTS URNS QL MICRO: ABNORMAL /LPF
COLOR UR: YELLOW
CRYSTALS URNS MICRO: ABNORMAL
EPI CELLS #/AREA URNS HPF: ABNORMAL /HPF (ref 0–10)
GLUCOSE UR QL: NEGATIVE
HGB UR QL STRIP: ABNORMAL
KETONES UR QL STRIP: ABNORMAL
LEUKOCYTE ESTERASE UR QL STRIP: ABNORMAL
MICRO URNS: ABNORMAL
MUCOUS THREADS URNS QL MICRO: PRESENT
NITRITE UR QL STRIP: NEGATIVE
PH UR STRIP: 6 [PH] (ref 5–7.5)
PROT UR QL STRIP: ABNORMAL
RBC #/AREA URNS HPF: ABNORMAL /HPF (ref 0–2)
SP GR UR: 1.02 (ref 1–1.03)
UNIDENT CRYS URNS QL MICRO: PRESENT
URINALYSIS REFLEX , 377201: ABNORMAL
UROBILINOGEN UR STRIP-MCNC: 1 MG/DL (ref 0.2–1)
WBC #/AREA URNS HPF: ABNORMAL /HPF (ref 0–5)

## 2020-10-06 NOTE — TELEPHONE ENCOUNTER
Called patient to discuss Urine Culture result (25-50,000 col E.col and 10-25,000 col of Proteus). LMVM asking she let me know whether the Cephalexin resolved her symptoms.

## 2020-10-14 ENCOUNTER — OFFICE VISIT (OUTPATIENT)
Dept: CARDIOLOGY CLINIC | Age: 85
End: 2020-10-14
Payer: MEDICARE

## 2020-10-14 DIAGNOSIS — Z95.0 CARDIAC PACEMAKER IN SITU: Primary | ICD-10-CM

## 2020-10-14 PROCEDURE — 93296 REM INTERROG EVL PM/IDS: CPT | Performed by: INTERNAL MEDICINE

## 2020-10-14 PROCEDURE — 93294 REM INTERROG EVL PM/LDLS PM: CPT | Performed by: INTERNAL MEDICINE

## 2020-12-21 ENCOUNTER — OFFICE VISIT (OUTPATIENT)
Dept: CARDIOLOGY CLINIC | Age: 85
End: 2020-12-21

## 2020-12-21 DIAGNOSIS — Z95.0 CARDIAC PACEMAKER IN SITU: Primary | ICD-10-CM

## 2021-01-18 ENCOUNTER — TELEPHONE (OUTPATIENT)
Dept: CARDIOLOGY CLINIC | Age: 86
End: 2021-01-18

## 2021-01-18 NOTE — TELEPHONE ENCOUNTER
Returned pt call. Confirmed her next in clinic appointment on 3-23-21 and as of right now we are seeing patients in office. Also explained that if she ever feels any symptoms she can always send a remote transmission in and follow it up with a phone call to us in the office to review it and call her back. Pt verbalized understanding and had no further questions.

## 2021-03-23 ENCOUNTER — CLINICAL SUPPORT (OUTPATIENT)
Dept: CARDIOLOGY CLINIC | Age: 86
End: 2021-03-23
Payer: MEDICARE

## 2021-03-23 ENCOUNTER — OFFICE VISIT (OUTPATIENT)
Dept: CARDIOLOGY CLINIC | Age: 86
End: 2021-03-23
Payer: MEDICARE

## 2021-03-23 VITALS
WEIGHT: 228 LBS | HEART RATE: 62 BPM | BODY MASS INDEX: 38.93 KG/M2 | RESPIRATION RATE: 16 BRPM | DIASTOLIC BLOOD PRESSURE: 82 MMHG | SYSTOLIC BLOOD PRESSURE: 128 MMHG | HEIGHT: 64 IN

## 2021-03-23 DIAGNOSIS — Z95.0 CARDIAC PACEMAKER IN SITU: Primary | ICD-10-CM

## 2021-03-23 DIAGNOSIS — I47.29 NSVT (NONSUSTAINED VENTRICULAR TACHYCARDIA): ICD-10-CM

## 2021-03-23 DIAGNOSIS — I10 ESSENTIAL HYPERTENSION, BENIGN: ICD-10-CM

## 2021-03-23 DIAGNOSIS — I44.2 ATRIOVENTRICULAR BLOCK, COMPLETE (HCC): ICD-10-CM

## 2021-03-23 PROCEDURE — 93280 PM DEVICE PROGR EVAL DUAL: CPT | Performed by: INTERNAL MEDICINE

## 2021-03-23 PROCEDURE — 1090F PRES/ABSN URINE INCON ASSESS: CPT | Performed by: INTERNAL MEDICINE

## 2021-03-23 PROCEDURE — 99214 OFFICE O/P EST MOD 30 MIN: CPT | Performed by: INTERNAL MEDICINE

## 2021-03-23 PROCEDURE — G8536 NO DOC ELDER MAL SCRN: HCPCS | Performed by: INTERNAL MEDICINE

## 2021-03-23 PROCEDURE — G0463 HOSPITAL OUTPT CLINIC VISIT: HCPCS | Performed by: INTERNAL MEDICINE

## 2021-03-23 PROCEDURE — G8427 DOCREV CUR MEDS BY ELIG CLIN: HCPCS | Performed by: INTERNAL MEDICINE

## 2021-03-23 PROCEDURE — 1101F PT FALLS ASSESS-DOCD LE1/YR: CPT | Performed by: INTERNAL MEDICINE

## 2021-03-23 PROCEDURE — G8417 CALC BMI ABV UP PARAM F/U: HCPCS | Performed by: INTERNAL MEDICINE

## 2021-03-23 PROCEDURE — G8432 DEP SCR NOT DOC, RNG: HCPCS | Performed by: INTERNAL MEDICINE

## 2021-03-23 NOTE — PROGRESS NOTES
Cardiac Electrophysiology Office Note     Subjective:      Liyah Ornelas is a 80 y.o. patient who is seen for evaluation of dual chamber pacemaker. It is [employer] and has replaced in August 2017  She is pacer dependent  The pacemaker lead is from [address] and RV pacing impedance is elevated. She thinks the initial pacemaker was implanted by Dr. Indira Bacon at [facility]. Hospital   pacing threshold is normal.   She has no chest pain, dizziness, dyspnea  No syncope  Last stress test 2007      Problem List  Date Reviewed: 3/23/2021          Codes Class Noted    Type 2 diabetes with nephropathy ([facility] ICD-10-CM: E11.21  ICD-9-CM: 250.40, 583.81  5/23/2019        PAT (paroxysmal atrial tachycardia) (HCC) ICD-10-CM: I47.1  ICD-9-CM: 427.0  12/19/2017        Diabetic eye exam ([facility] (Chronic) ICD-10-CM: Z01.00, E11.9  ICD-9-CM: V72.0, 250.00  8/26/2016    Overview Addendum 9/20/2018  5:55 PM by MD KATELYN Fink Dr. Harrietta Connor, 6/7/18             Advance directive discussed with patient (Chronic) ICD-10-CM: Z71.89  ICD-9-CM: V65.49  4/13/2016    Overview Signed 4/13/2016 12:45 PM by Jacqueline Ma MD     4/13/2016 . Has one established and will bring in for chart. Diabetes type 2, controlled ([facility] ICD-10-CM: E11.9  ICD-9-CM: 250.00  10/10/2014    Overview Signed 10/10/2014  1:49 PM by Jacqueline Ma MD     Diet controlled. Colon cancer screening (Chronic) ICD-10-CM: Z12.11  ICD-9-CM: V76.51  9/5/2012    Overview Addendum 3/29/2013 11:34 AM by MD Dr. Tran Fink. 2010, normal.  Pt declines to have another.              History of screening mammography (Chronic) ICD-10-CM: Z92.89  ICD-9-CM: V15.89  6/27/2012    Overview Addendum 7/21/2017  5:09 PM by Jacqueline Ma MD     7/19/17, [facility] [place]             S/P FERNANDO-BSO ICD-10-CM: Z90.710, E85.647, Z90.79  ICD-9-CM: V88.01, V45.77  3/2/2012    Overview Signed 3/2/2012  2:40 PM by Jacqueline Ma MD     1977             Allergy to environmental factors ICD-10-CM: Z91.09  ICD-9-CM: V15.09  3/2/2012        Rotator cuff tear, right ICD-10-CM: M75.101  ICD-9-CM: 840.4  3/2/2012        S/P right cataract extraction ICD-10-CM: Z98.41  ICD-9-CM: V45.61  3/2/2012    Overview Signed 3/2/2012  2:41 PM by MD Dr. 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 Ricardo Fisher                   Current Outpatient Medications   Medication Sig Dispense Refill    amLODIPine (NORVASC) 2.5 mg tablet TAKE ONE TABLET BY MOUTH ONE TIME DAILY 90 Tab 1    benazepriL (LOTENSIN) 10 mg tablet TAKE ONE TABLET BY MOUTH ONE TIME DAILY 90 Tab 1    Blood-Glucose Meter monitoring kit One touch ultra. Use to test blood sugars once daily. DX:E11.21 1 Kit 0    lancets misc One touch ultra. Use to test blood sugars once daily. DX:E11.21 100 Each 11    glucose blood VI test strips (blood glucose test) strip One touch ultra. Use to test blood sugars once daily. DX:E11.21 100 Strip 11    Blood-Glucose Meter monitoring kit Use daily as directed 1 Kit 0    Blood-Glucose Meter monitoring kit Use as directed 1 Kit 0    glucose blood VI test strips (ASCENSIA AUTODISC VI, ONE TOUCH ULTRA TEST VI) strip Use to monitor blood sugar twice daily or as directed. 100 Strip 1    IBUPROFEN (ADVIL PO) Take 2 Tabs by mouth nightly.  cetirizine (ZYRTEC) 5 mg tablet Take 10 mg by mouth daily.        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  Menopause     LMP-?    Rotator cuff tear 2011    right    S/P FERNANDO-BSO 3/2/2012     Past Surgical History:   Procedure Laterality Date    CARDIAC SURG PROCEDURE UNLIST      Pacemaker Battery Replacement    CARDIAC SURG PROCEDURE UNLIST      Pacemaker Implant    CHEST SURGERY PROCEDURE UNLISTED      Pacemaker    ENDOSCOPY, COLON, DIAGNOSTIC  08    polyp (Brand)    HX HEENT  6/7/10    ELIAZAR O.D.    HX ORTHOPAEDIC  1992    R Elbow Fx - ORIF    HX FERNANDO AND BSO      ND REMVL PERM PM PLS GEN W/REPL PLSE GEN 2 LEAD SYS  2017          Family History   Problem Relation Age of Onset    Cancer Mother     Stroke Father      Social History     Tobacco Use    Smoking status: Former Smoker     Quit date: 2007     Years since quittin.9    Smokeless tobacco: Never Used   Substance Use Topics    Alcohol use: Yes     Alcohol/week: 5.8 - 8.3 standard drinks     Types: 7 - 10 Standard drinks or equivalent per week     Comment: pt states she drinks vodka and burbon everyday        Review of Systems:   Constitutional: Negative for fever, chills, weight loss,malaise/fatigue. HEENT: Negative for nosebleeds, vision changes. Respiratory: Negative for cough, hemoptysis  Cardiovascular: Negative for chest pain, palpitations, orthopnea, claudication, leg swelling, syncope, and PND. Gastrointestinal: Negative for nausea, vomiting, diarrhea, blood in stool and melena.    Genitourinary: Negative for dysuria, and hematuria. Musculoskeletal: Negative for myalgias, + arthralgia. Skin: Negative for rash. Heme: Does not bleed or bruise easily. Neurological: Negative for speech change and focal weakness     Objective:     Visit Vitals  /82 (BP 1 Location: Left upper arm, BP Patient Position: Sitting)   Pulse 62   Resp 16   Ht 5' 4\" (1.626 m)   Wt 228 lb (103.4 kg)   LMP  (LMP Unknown)   BMI 39.14 kg/m²      Physical Exam:   Constitutional: well-developed and well-nourished. No respiratory distress. Head: Normocephalic and atraumatic. Eyes: Pupils are equal, round  ENT: hearing normal  Neck: supple. No JVD present. Cardiovascular: Normal rate, regular rhythm. Exam reveals no gallop and no friction rub. No murmur heard. Pulmonary/Chest: Effort normal and breath sounds normal. No wheezes. Abdominal: Soft + obesity  Musculoskeletal: no edema. Neurological: alert, oriented. Skin  right sided pacer with healed incision  Psychiatric: normal mood and affect. Behavior is normal. Judgment and thought content normal.         Assessment/Plan:       ICD-10-CM ICD-9-CM    1. Cardiac pacemaker in situ  Z95.0 V45.01    2. NSVT (nonsustained ventricular tachycardia) ([facility])  I47.2 427.1    3. Atrioventricular block, complete ([facility])  I44.2 426.0    4.  Hypertension, benign  I10 401.1      4 second NSVT   She did not want stress test  Battery pacer 9 years   She has 100 % RV pacing  I recommend at least 2 D echo and the patient agreed to proceed    The patient's pacemaker showed normal functions   She had hx of PAT but dependent on RV pacing so she is asymptomatic  Blood pressure is normal today  Future Appointments   Date Time Provider Caren Cotto   8/9/2021  3:00 PM REMOTE1, RHIANNA CLIFFORD AMB   11/15/2021 10:30 AM REMOTE1, RHIANNA CLIFFORD AMB   2/21/2022  9:00 AM REMOTE1, RHIANNA CLIFFORD AMB   5/24/2022  2:00 PM PACEMAKER3, RHIANNA CLIFFORD AMB   5/24/2022  2:20 PM MD ANA MARIA Nava BS AMB Thank you for involving me in this patient's care and please call with further concerns or questions. Hammad Christensen M.D.   Electrophysiology/Cardiology  [facility] [place]  [facility], [facility] [address] [place] [address] [address] [place] [address]  [phone]

## 2021-04-07 ENCOUNTER — TELEPHONE (OUTPATIENT)
Dept: INTERNAL MEDICINE CLINIC | Age: 86
End: 2021-04-07

## 2021-04-07 NOTE — TELEPHONE ENCOUNTER
Spoke with pt. She states she has a UTI and wants Dr Iraj Hatfield to send in an antibiotic for her. Asked her what type of symptoms was she having? She states same as last time - difficulty urinating and urgency. No fever. Advised her Dr Iraj Hatfield was out of the office until 4/12/21. I  Can schedule her with one of the partners tomorrow for evaluation. She declined. She states she told me what was wrong and all she needs is an antibiotic. Advised pt for her best interest and best care for her she needed to come in to give urine sample to culture. This would tell us what bacteria was growing and what antibiotic was best to treat her. She still did not want to come in. I recommended if she did not want to come for an appt here she should at least be seen at [facility] to be treated.  Will forward to Dr Iraj Hatfield.

## 2021-04-25 NOTE — PROGRESS NOTES
Subjective:      Aleshia Catalan is a 80 y.o. female who presents today for follow up of her hypertension, hyperlipidemia and diabetes mellitus type 2. Has had some increase in urinary frequency. Her last visit was 10/1/2020 with Arlen Walsh, NP  Labs ordered at that time were not completed. She is here today with her daughter, Lance Rowe. She states that she doesn't feel well. - drinking only 10 ounces water per day, etoh- vodka, couple drinks per day, very little exercise. Some back pain. Tired of being in her home 24 hours per day. Appetite is good. Memory slight impaired, short term. Sleep is stable. -lives in and independent living apartment. Uses cane for ambulation. No recent falls    Consultants:  -Dr. Severiano Benson - pacemaker, heart block    Due for:  -Medicare Wellness Exam   -eye exam    Current Outpatient Medications   Medication Sig Dispense Refill    glucose blood VI test strips (ASCENSIA AUTODISC VI, ONE TOUCH ULTRA TEST VI) strip Use to monitor blood sugar twice daily or as directed. 100 Strip 0    amLODIPine (NORVASC) 2.5 mg tablet TAKE ONE TABLET BY MOUTH ONE TIME DAILY 90 Tab 1    benazepriL (LOTENSIN) 10 mg tablet TAKE ONE TABLET BY MOUTH ONE TIME DAILY 90 Tab 1    Blood-Glucose Meter monitoring kit One touch ultra. Use to test blood sugars once daily. DX:E11.21 1 Kit 0    lancets misc One touch ultra. Use to test blood sugars once daily. DX:E11.21 100 Each 11    glucose blood VI test strips (blood glucose test) strip One touch ultra. Use to test blood sugars once daily. DX:E11.21 100 Strip 11    Blood-Glucose Meter monitoring kit Use daily as directed 1 Kit 0    Blood-Glucose Meter monitoring kit Use as directed 1 Kit 0    IBUPROFEN (ADVIL PO) Take 2 Tabs by mouth nightly.  cetirizine (ZYRTEC) 5 mg tablet Take 10 mg by mouth daily. Review of Systems    Pertinent items are noted in HPI. Objective:      Wt Readings from Last 3 Encounters:   03/23/21 228 lb (103.4 kg)   02/11/20 207 lb (93.9 kg)   11/21/19 208 lb (94.3 kg)     BP Readings from Last 3 Encounters:   03/23/21 128/82   10/01/20 120/68   02/11/20 120/66     Visit Vitals  /62   Pulse 84   Temp 97.5 °F (36.4 °C) (Temporal)   Resp 16   Ht 5' 4\" (1.626 m)   Wt 199 lb 9.6 oz (90.5 kg)   LMP  (LMP Unknown)   SpO2 95%   BMI 34.26 kg/m²     General appearance: alert, cooperative, no distress, appears stated age  Head: Normocephalic, without obvious abnormality, atraumatic  Ears: normal TM's and external ear canals AU. Bilateral hearing aids  Lungs: clear to auscultation bilaterally  Heart: regular rate and rhythm, S1, S2 normal, no murmur, click, rub or gallop  Abdomen: soft, non-tender. Bowel sounds normal. No masses,  no organomegaly  Extremities: extremities normal, atraumatic, no cyanosis or edema, varicose veins noted    Assessment/Plan:     1. Type 2 diabetes with nephropathy (HCC)  -needs refill of her test strips. -diabetes mellitus is diet controlled. - CBC WITH AUTOMATED DIFF; Future  - METABOLIC PANEL, COMPREHENSIVE; Future  - HEMOGLOBIN A1C WITH EAG; Future  - LIPID PANEL; Future    2. Hypertension, benign  -I evaluated and recommended to continue current doses of medications. - CBC WITH AUTOMATED DIFF; Future  - METABOLIC PANEL, COMPREHENSIVE; Future    3. Mixed hyperlipidemia  -due for lipid profile today.     - TSH 3RD GENERATION; Future    4. Encounter for long-term (current) use of medications      5. Medicare annual wellness visit, subsequent  -patient encouraged to work on cutting out her etoh use. - DEPRESSION SCREEN ANNUAL    6. Screening for depression  -she is feeling some depression due to environment. Quarantine and lack of interaction with others. She is going out to see some neighbors periodically. She feels tired and old. - DEPRESSION SCREEN ANNUAL    7.  Urinary frequency    - URINALYSIS W/ REFLEX CULTURE; Future     Orders Placed This Encounter   [employer] DEPRESSION SCREEN 8-15 MIN    CBC WITH AUTOMATED DIFF     Standing Status:   Future     Standing Expiration Date:   0/44/6009    METABOLIC PANEL, COMPREHENSIVE     Standing Status:   Future     Standing Expiration Date:   4/26/2022    TSH 3RD GENERATION     Standing Status:   Future     Standing Expiration Date:   4/26/2022    HEMOGLOBIN A1C WITH EAG     Standing Status:   Future     Standing Expiration Date:   4/26/2022    LIPID PANEL     Standing Status:   Future     Standing Expiration Date:   4/26/2022    URINALYSIS W/ REFLEX CULTURE     Standing Status:   Future     Standing Expiration Date:   4/26/2022    glucose blood VI test strips (blood glucose test) strip     Sig: One touch ultra. Use to test blood sugars once daily. DX:E11.21     Dispense:  100 Strip     Refill:  3      Follow-up Disposition:     Follow up 4 months      Return if symptoms worsen or fail to improve. Advised patient to call back or return to office if symptoms worsen/change/persist.     Discussed expected course/resolution/complications of diagnosis in detail with patient. Medication risks/benefits/costs/interactions/alternatives discussed with patient. Patient was given an after visit summary which includes diagnoses, current medications, & vitals. Patient expressed understanding with the diagnosis and plan. This is the Subsequent Medicare Annual Wellness Exam, performed 12 months or more after the Initial AWV or the last Subsequent AWV    I have reviewed the patient's medical history in detail and updated the computerized patient record. Assessment/Plan   Education and counseling provided:  Are appropriate based on today's review and evaluation    1. Medicare annual wellness visit, subsequent  -     [place] [address]  2. Type 2 diabetes with nephropathy ([facility]  3. Hypertension, benign  4. Mixed hyperlipidemia  5. Encounter for long-term (current) use of medications  6.  Screening for depression  - DEPRESSION SCREEN ANNUAL       Depression Risk Factor Screening     3 most recent PHQ Screens 4/26/2021   Little interest or pleasure in doing things Several days   Feeling down, depressed, irritable, or hopeless More than half the days   Total Score PHQ 2 3   Trouble falling or staying asleep, or sleeping too much Not at all   Feeling tired or having little energy More than half the days   Poor appetite, weight loss, or overeating Not at all   Feeling bad about yourself - or that you are a failure or have let yourself or your family down Not at all   Trouble concentrating on things such as school, work, reading, or watching TV Several days   Moving or speaking so slowly that other people could have noticed; or the opposite being so fidgety that others notice Not at all   Thoughts of being better off dead, or hurting yourself in some way Not at all   PHQ 9 Score 6       Alcohol Risk Screen    Do you average more than 1 drink per night or more than 7 drinks a week:  Yes    On any one occasion in the past three months have you have had more than 3 drinks containing alcohol:          Functional Ability and Level of Safety    Hearing: The patient wears hearing aids. Activities of Daily Living: The home contains: handrails and grab bars  Patient does total self care - very limited driving. Ambulation: with mild difficulty - uses cane when she is out. Fall Risk:  Fall Risk Assessment, last 12 mths 5/21/2020   Able to walk? Yes   Fall in past 12 months? No   Number of falls in past 12 months -   Fall with injury?  -      Abuse Screen:  Patient is not abused       Cognitive Screening    Has your family/caregiver stated any concerns about your memory: no         Health Maintenance Due     Health Maintenance Due   Topic Date Due    Shingrix Vaccine Age 49> (1 of 2) Never done    Eye Exam Retinal or Dilated  06/07/2020    MICROALBUMIN Q1  11/21/2020    Pneumococcal 65+ years (2 of 2 - PPSV23) 12/02/2020 Patient Care Team   Patient Care Team:  Delia Winn MD as PCP - General (Internal Medicine)  Delia Winn MD as PCP - [facility] (Cardiology)  Benjamin Bryant MD (Ophthalmology)  Elpidio Kwon MD (Cardiology)    History     Patient Active Problem List   Diagnosis Code    Atrioventricular block, 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     hyperlipidemia E78.5    Hypertension, benign I10    Cardiac pacemaker in situ Z95.0    S/P FERNANDO-BSO Z90.710, Z90.722, Z90.79    Allergy to environmental factors Z91.09    Rotator cuff tear, right M75.101    S/P right cataract extraction Z98.41    History of screening mammography Z92.89    Colon cancer screening Z12.11    Diabetes type 2, controlled ([address] E11.9    Advance directive discussed with patient Z71.89    Diabetic eye exam ([address] Z01.00, E11.9    PAT (paroxysmal atrial tachycardia) ([address] I47.1    Type 2 diabetes with nephropathy ([address] E11.21     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     Menopause     LMP-?    Rotator cuff tear 8/2011    right    S/P FERNANDO-BSO 3/2/2012      Past Surgical History:   Procedure Laterality Date    ENDOSCOPY, COLON, DIAGNOSTIC  11/18/08    polyp (Brand)    HX HEENT  6/7/10    ELIAZAR O.D.    HX ORTHOPAEDIC  1992    R Elbow Fx - ORIF    HX FERNANDO AND BSO  1975    AR CARDIAC SURG PROCEDURE UNLIST  03/08    Pacemaker Battery Replacement    AR CARDIAC SURG PROCEDURE UNLIST  1996    Pacemaker Implant    AR CHEST SURGERY PROCEDURE UNLISTED      Pacemaker    AR REMVL PERM PM PLS GEN W/REPL PLSE GEN 2 LEAD SYS  8/18/2017          Current Outpatient Medications   Medication Sig Dispense Refill    glucose blood VI test strips (ASCENSIA AUTODISC VI, ONE TOUCH ULTRA TEST VI) strip Use to monitor blood sugar twice daily or as directed. 100 Strip 0    amLODIPine (NORVASC) 2.5 mg tablet TAKE ONE TABLET BY MOUTH ONE TIME DAILY 90 Tab 1    benazepriL (LOTENSIN) 10 mg tablet TAKE ONE TABLET BY MOUTH ONE TIME DAILY 90 Tab 1    Blood-Glucose Meter monitoring kit One touch ultra. Use to test blood sugars once daily. DX:E11.21 1 Kit 0    lancets misc One touch ultra. Use to test blood sugars once daily. DX:E11.21 100 Each 11    glucose blood VI test strips (blood glucose test) strip One touch ultra. Use to test blood sugars once daily. DX:E11.21 100 Strip 11    Blood-Glucose Meter monitoring kit Use daily as directed 1 Kit 0    Blood-Glucose Meter monitoring kit Use as directed 1 Kit 0    IBUPROFEN (ADVIL PO) Take 2 Tabs by mouth nightly.  cetirizine (ZYRTEC) 5 mg tablet Take 10 mg by mouth daily. Allergies   Allergen Reactions    Ciprofloxacin Other (comments)     \"jittery\"    Lipitor [Atorvastatin] Myalgia    Mevacor [Lovastatin] Other (comments)     dyspepsia    Pcn [Penicillins] Hives    Zocor [Simvastatin] Myalgia       Family History   Problem Relation Age of Onset    Cancer Mother     Stroke Father      Social History     Tobacco Use    Smoking status: Former Smoker     Quit date: 2007     Years since quittin.0    Smokeless tobacco: Never Used   Substance Use Topics    Alcohol use:  Yes     Alcohol/week: 5.8 - 8.3 standard drinks     Types: 7 - 10 Standard drinks or equivalent per week     Comment: pt states she drinks vodka and burbon everyday         Margaret Mora MD

## 2021-04-26 ENCOUNTER — OFFICE VISIT (OUTPATIENT)
Dept: INTERNAL MEDICINE CLINIC | Age: 86
End: 2021-04-26
Payer: MEDICARE

## 2021-04-26 ENCOUNTER — TELEPHONE (OUTPATIENT)
Dept: INTERNAL MEDICINE CLINIC | Age: 86
End: 2021-04-26

## 2021-04-26 VITALS
DIASTOLIC BLOOD PRESSURE: 62 MMHG | OXYGEN SATURATION: 95 % | WEIGHT: 199.6 LBS | HEIGHT: 64 IN | SYSTOLIC BLOOD PRESSURE: 136 MMHG | BODY MASS INDEX: 34.08 KG/M2 | RESPIRATION RATE: 16 BRPM | TEMPERATURE: 97.5 F | HEART RATE: 84 BPM

## 2021-04-26 DIAGNOSIS — Z00.00 MEDICARE ANNUAL WELLNESS VISIT, SUBSEQUENT: Primary | ICD-10-CM

## 2021-04-26 DIAGNOSIS — R35.0 URINARY FREQUENCY: ICD-10-CM

## 2021-04-26 DIAGNOSIS — I10 ESSENTIAL HYPERTENSION, BENIGN: ICD-10-CM

## 2021-04-26 DIAGNOSIS — Z79.899 ENCOUNTER FOR LONG-TERM (CURRENT) USE OF MEDICATIONS: ICD-10-CM

## 2021-04-26 DIAGNOSIS — Z13.31 SCREENING FOR DEPRESSION: ICD-10-CM

## 2021-04-26 DIAGNOSIS — E78.2 MIXED HYPERLIPIDEMIA: ICD-10-CM

## 2021-04-26 DIAGNOSIS — E11.21 TYPE 2 DIABETES WITH NEPHROPATHY (HCC): ICD-10-CM

## 2021-04-26 LAB
ALBUMIN SERPL-MCNC: 4.2 G/DL (ref 3.5–5)
ALBUMIN/GLOB SERPL: 1.4 {RATIO} (ref 1.1–2.2)
ALP SERPL-CCNC: 75 U/L (ref 45–117)
ALT SERPL-CCNC: 26 U/L (ref 12–78)
ANION GAP SERPL CALC-SCNC: 7 MMOL/L (ref 5–15)
APPEARANCE UR: CLEAR
AST SERPL-CCNC: 26 U/L (ref 15–37)
BACTERIA URNS QL MICRO: NEGATIVE /HPF
BASOPHILS # BLD: 0.1 K/UL (ref 0–0.1)
BASOPHILS NFR BLD: 1 % (ref 0–1)
BILIRUB SERPL-MCNC: 0.8 MG/DL (ref 0.2–1)
BILIRUB UR QL: NEGATIVE
BUN SERPL-MCNC: 22 MG/DL (ref 6–20)
BUN/CREAT SERPL: 25 (ref 12–20)
CALCIUM SERPL-MCNC: 9.9 MG/DL (ref 8.5–10.1)
CHLORIDE SERPL-SCNC: 105 MMOL/L (ref 97–108)
CHOLEST SERPL-MCNC: 215 MG/DL
CO2 SERPL-SCNC: 28 MMOL/L (ref 21–32)
COLOR UR: NORMAL
CREAT SERPL-MCNC: 0.89 MG/DL (ref 0.55–1.02)
DIFFERENTIAL METHOD BLD: NORMAL
EOSINOPHIL # BLD: 0.1 K/UL (ref 0–0.4)
EOSINOPHIL NFR BLD: 1 % (ref 0–7)
EPITH CASTS URNS QL MICRO: NORMAL /LPF
ERYTHROCYTE [DISTWIDTH] IN BLOOD BY AUTOMATED COUNT: 12.6 % (ref 11.5–14.5)
EST. AVERAGE GLUCOSE BLD GHB EST-MCNC: 126 MG/DL
GLOBULIN SER CALC-MCNC: 2.9 G/DL (ref 2–4)
GLUCOSE SERPL-MCNC: 112 MG/DL (ref 65–100)
GLUCOSE UR STRIP.AUTO-MCNC: NEGATIVE MG/DL
HBA1C MFR BLD: 6 % (ref 4–5.6)
HCT VFR BLD AUTO: 45.8 % (ref 35–47)
HDLC SERPL-MCNC: 83 MG/DL
HDLC SERPL: 2.6 {RATIO} (ref 0–5)
HGB BLD-MCNC: 14.8 G/DL (ref 11.5–16)
HGB UR QL STRIP: NEGATIVE
HYALINE CASTS URNS QL MICRO: NORMAL /LPF (ref 0–5)
IMM GRANULOCYTES # BLD AUTO: 0 K/UL (ref 0–0.04)
IMM GRANULOCYTES NFR BLD AUTO: 0 % (ref 0–0.5)
KETONES UR QL STRIP.AUTO: NEGATIVE MG/DL
LDLC SERPL CALC-MCNC: 119.4 MG/DL (ref 0–100)
LEUKOCYTE ESTERASE UR QL STRIP.AUTO: NEGATIVE
LIPID PROFILE,FLP: ABNORMAL
LYMPHOCYTES # BLD: 1.4 K/UL (ref 0.8–3.5)
LYMPHOCYTES NFR BLD: 19 % (ref 12–49)
MCH RBC QN AUTO: 30.3 PG (ref 26–34)
MCHC RBC AUTO-ENTMCNC: 32.3 G/DL (ref 30–36.5)
MCV RBC AUTO: 93.9 FL (ref 80–99)
MONOCYTES # BLD: 0.8 K/UL (ref 0–1)
MONOCYTES NFR BLD: 11 % (ref 5–13)
NEUTS SEG # BLD: 5.1 K/UL (ref 1.8–8)
NEUTS SEG NFR BLD: 68 % (ref 32–75)
NITRITE UR QL STRIP.AUTO: NEGATIVE
NRBC # BLD: 0 K/UL (ref 0–0.01)
NRBC BLD-RTO: 0 PER 100 WBC
PH UR STRIP: 5.5 [PH] (ref 5–8)
PLATELET # BLD AUTO: 185 K/UL (ref 150–400)
PMV BLD AUTO: 11.9 FL (ref 8.9–12.9)
POTASSIUM SERPL-SCNC: 4.3 MMOL/L (ref 3.5–5.1)
PROT SERPL-MCNC: 7.1 G/DL (ref 6.4–8.2)
PROT UR STRIP-MCNC: NEGATIVE MG/DL
RBC # BLD AUTO: 4.88 M/UL (ref 3.8–5.2)
RBC #/AREA URNS HPF: NORMAL /HPF (ref 0–5)
SODIUM SERPL-SCNC: 140 MMOL/L (ref 136–145)
SP GR UR REFRACTOMETRY: 1.02 (ref 1–1.03)
TRIGL SERPL-MCNC: 63 MG/DL (ref ?–150)
TSH SERPL DL<=0.05 MIU/L-ACNC: 3.79 UIU/ML (ref 0.36–3.74)
UA: UC IF INDICATED,UAUC: NORMAL
UROBILINOGEN UR QL STRIP.AUTO: 0.2 EU/DL (ref 0.2–1)
VLDLC SERPL CALC-MCNC: 12.6 MG/DL
WBC # BLD AUTO: 7.4 K/UL (ref 3.6–11)
WBC URNS QL MICRO: NORMAL /HPF (ref 0–4)

## 2021-04-26 PROCEDURE — G0439 PPPS, SUBSEQ VISIT: HCPCS | Performed by: INTERNAL MEDICINE

## 2021-04-26 PROCEDURE — 99214 OFFICE O/P EST MOD 30 MIN: CPT | Performed by: INTERNAL MEDICINE

## 2021-04-26 PROCEDURE — G0463 HOSPITAL OUTPT CLINIC VISIT: HCPCS | Performed by: INTERNAL MEDICINE

## 2021-04-26 PROCEDURE — G8432 DEP SCR NOT DOC, RNG: HCPCS | Performed by: INTERNAL MEDICINE

## 2021-04-26 PROCEDURE — 1101F PT FALLS ASSESS-DOCD LE1/YR: CPT | Performed by: INTERNAL MEDICINE

## 2021-04-26 PROCEDURE — 1090F PRES/ABSN URINE INCON ASSESS: CPT | Performed by: INTERNAL MEDICINE

## 2021-04-26 PROCEDURE — G8536 NO DOC ELDER MAL SCRN: HCPCS | Performed by: INTERNAL MEDICINE

## 2021-04-26 PROCEDURE — G8427 DOCREV CUR MEDS BY ELIG CLIN: HCPCS | Performed by: INTERNAL MEDICINE

## 2021-04-26 PROCEDURE — G8417 CALC BMI ABV UP PARAM F/U: HCPCS | Performed by: INTERNAL MEDICINE

## 2021-04-26 RX ORDER — IBUPROFEN 200 MG
CAPSULE ORAL
Qty: 100 STRIP | Refills: 3 | Status: SHIPPED | OUTPATIENT
Start: 2021-04-26

## 2021-04-26 NOTE — TELEPHONE ENCOUNTER
Jammie Thompson () [phone] (H)     pt's daughter is calling regarding some things she she would tell dr garland before her mother's appt today at 11:20 today.

## 2021-04-26 NOTE — LETTER
4/26/2021 Ms. [address] [facility] [place] [phone] Dear [address]: 
 
Please find your most recent results below. Resulted Orders URINALYSIS W/ REFLEX CULTURE Result Value Ref Range Color YELLOW/STRAW Appearance CLEAR CLEAR Specific gravity 1.018 1.003 - 1.030    
 pH (UA) 5.5 5.0 - 8.0 Protein Negative Negative mg/dL Glucose Negative Negative mg/dL Ketone Negative Negative mg/dL Bilirubin Negative Negative Blood Negative Negative Urobilinogen 0.2 0.2 - 1.0 EU/dL Nitrites Negative Negative Leukocyte Esterase Negative Negative UA:UC IF INDICATED CULTURE NOT INDICATED BY UA RESULT CULTURE NOT INDICATED BY UA RESULT    
 WBC 0-4 0 - 4 /hpf  
 RBC 0-5 0 - 5 /hpf Epithelial cells FEW FEW /lpf Bacteria Negative Negative /hpf Hyaline cast 0-2 0 - 5 /lpf  
LIPID PANEL Result Value Ref Range LIPID PROFILE Cholesterol, total 215 (H) <200 MG/DL Triglyceride 63 <150 MG/DL  
 HDL Cholesterol 83 MG/DL  
 LDL, calculated 119.4 (H) 0 - 100 MG/DL VLDL, calculated 12.6 MG/DL  
 CHOL/HDL Ratio 2.6 0.0 - 5.0 HEMOGLOBIN A1C WITH EAG Result Value Ref Range Hemoglobin A1c 6.0 (H) 4.0 - 5.6 % Est. average glucose 126 mg/dL TSH 3RD GENERATION Result Value Ref Range TSH 3.79 (H)  ok 0.36 - 3.74 uIU/mL METABOLIC PANEL, COMPREHENSIVE Result Value Ref Range Sodium 140 136 - 145 mmol/L Potassium 4.3 3.5 - 5.1 mmol/L Chloride 105 97 - 108 mmol/L  
 CO2 28 21 - 32 mmol/L Anion gap 7 5 - 15 mmol/L Glucose 112 (H) 65 - 100 mg/dL BUN 22 (H) 6 - 20 MG/DL Creatinine 0.89 0.55 - 1.02 MG/DL  
 BUN/Creatinine ratio 25 (H) 12 - 20 GFR est AA >60 >60 ml/min/1.73m2 GFR est non-AA >60 >60 ml/min/1.73m2 Calcium 9.9 8.5 - 10.1 MG/DL Bilirubin, total 0.8 0.2 - 1.0 MG/DL  
 ALT (SGPT) 26 12 - 78 U/L  
 AST (SGOT) 26 15 - 37 U/L Alk.  phosphatase 75 45 - 117 U/L  
 Protein, total 7.1 6.4 - 8.2 g/dL Albumin 4.2 3.5 - 5.0 g/dL Globulin 2.9 2.0 - 4.0 g/dL A-G Ratio 1.4 1.1 - 2.2    
CBC WITH AUTOMATED DIFF Result Value Ref Range WBC 7.4 3.6 - 11.0 K/uL  
 RBC 4.88 3.80 - 5.20 M/uL  
 HGB 14.8 11.5 - 16.0 g/dL HCT 45.8 35.0 - 47.0 % MCV 93.9 80.0 - 99.0 FL  
 MCH 30.3 26.0 - 34.0 PG  
 MCHC 32.3 30.0 - 36.5 g/dL  
 RDW 12.6 11.5 - 14.5 % PLATELET 745 374 - 046 K/uL MPV 11.9 8.9 - 12.9 FL  
 NRBC 0.0 0  WBC ABSOLUTE NRBC 0.00 0.00 - 0.01 K/uL NEUTROPHILS 68 32 - 75 % LYMPHOCYTES 19 12 - 49 % MONOCYTES 11 5 - 13 % EOSINOPHILS 1 0 - 7 % BASOPHILS 1 0 - 1 % IMMATURE GRANULOCYTES 0 0.0 - 0.5 % ABS. NEUTROPHILS 5.1 1.8 - 8.0 K/UL  
 ABS. LYMPHOCYTES 1.4 0.8 - 3.5 K/UL  
 ABS. MONOCYTES 0.8 0.0 - 1.0 K/UL  
 ABS. EOSINOPHILS 0.1 0.0 - 0.4 K/UL  
 ABS. BASOPHILS 0.1 0.0 - 0.1 K/UL  
 ABS. IMM. GRANS. 0.0 0.00 - 0.04 K/UL  
 DF AUTOMATED    
 
 
RECOMMENDATIONS: 
Your labs are all stable. Your urinalysis does not show any evidence of a UTI. Your diabetes is very well controlled with a hemoglobin A1c of 6.0%. Your kidney function, BUN and creatinine, show that you are dehydrated. Please work on increasing your water intake and abstain from alcohol use. You are doing a GREAT job in controlling your blood sugar. No medication adjustments needed at this time. Please let me know when you are ready for physical therapy. Please call me if you have any questions: [phone] Sincerely, Sara Guajardo MD

## 2021-04-26 NOTE — PATIENT INSTRUCTIONS
Medicare Wellness Visit, Female The best way to live healthy is to have a lifestyle where you eat a well-balanced diet, exercise regularly, limit alcohol use, and quit all forms of tobacco/nicotine, if applicable. Regular preventive services are another way to keep healthy. Preventive services (vaccines, screening tests, monitoring & exams) can help personalize your care plan, which helps you manage your own care. Screening tests can find health problems at the earliest stages, when they are easiest to treat. [facility] follows the current, evidence-based guidelines published by the [facility] Swain ([facility]) when recommending preventive services for our patients. Because we follow these guidelines, sometimes recommendations change over time as research supports it. (For example, mammograms used to be recommended annually. Even though Medicare will still pay for an annual mammogram, the newer guidelines recommend a mammogram every two years for women of average risk). Of course, you and your doctor may decide to screen more often for some diseases, based on your risk and your co-morbidities (chronic disease you are already diagnosed with). Preventive services for you include: - Medicare offers their members a free annual wellness visit, which is time for you and your primary care provider to discuss and plan for your preventive service needs. Take advantage of this benefit every year! 
-All adults over the age of 72 should receive the recommended pneumonia vaccines. Current USPSTF guidelines recommend a series of two vaccines for the best pneumonia protection.  
-All adults should have a flu vaccine yearly and a tetanus vaccine every 10 years.  
-All adults age 48 and older should receive the shingles vaccines (series of two vaccines).      
-All adults age 38-68 who are overweight should have a diabetes screening test once every three years.  
-All adults born between [birth year] and 1965 should be screened once for Hepatitis C. 
-Other screening tests and preventive services for persons with diabetes include: an eye exam to screen for diabetic retinopathy, a kidney function test, a foot exam, and stricter control over your cholesterol.  
-Cardiovascular screening for adults with routine risk involves an electrocardiogram (ECG) at intervals determined by your doctor.  
-Colorectal cancer screenings should be done for adults age 54-65 with no increased risk factors for colorectal cancer. There are a number of acceptable methods of screening for this type of cancer. Each test has its own benefits and drawbacks. Discuss with your doctor what is most appropriate for you during your annual wellness visit. The different tests include: colonoscopy (considered the best screening method), a fecal occult blood test, a fecal DNA test, and sigmoidoscopy. 
 
-A bone mass density test is recommended when a woman turns 65 to screen for osteoporosis. This test is only recommended one time, as a screening. Some providers will use this same test as a disease monitoring tool if you already have osteoporosis. -Breast cancer screenings are recommended every other year for women of normal risk, age 54-69. 
-Cervical cancer screenings for women over age 72 are only recommended with certain risk factors.

## 2021-05-20 RX ORDER — BENAZEPRIL HYDROCHLORIDE 10 MG/1
TABLET ORAL
Qty: 90 TABLET | Refills: 1 | Status: SHIPPED | OUTPATIENT
Start: 2021-05-20 | End: 2021-11-16

## 2021-05-20 RX ORDER — AMLODIPINE BESYLATE 2.5 MG/1
TABLET ORAL
Qty: 90 TABLET | Refills: 1 | Status: SHIPPED | OUTPATIENT
Start: 2021-05-20 | End: 2021-11-16

## 2021-08-23 ENCOUNTER — OFFICE VISIT (OUTPATIENT)
Dept: CARDIOLOGY CLINIC | Age: 86
End: 2021-08-23
Payer: MEDICARE

## 2021-08-23 DIAGNOSIS — Z95.0 CARDIAC PACEMAKER IN SITU: Primary | ICD-10-CM

## 2021-08-23 PROCEDURE — 93296 REM INTERROG EVL PM/IDS: CPT | Performed by: INTERNAL MEDICINE

## 2021-08-23 NOTE — LETTER
8/23/2021 4:23 PM    Ms. Casey Tejada  [address] [phone]        This letter confirms that we have received your scheduled remote check of your implanted     device on 8- . Our EP team will contact you via phone if there are significant abnormal    findings. Your next remote check from home is scheduled for 11-. If you have any questions, please call [address] [place] [address] Drive at [phone].                Sincerely,    Dorcas Colorado MD [facility]

## 2021-08-23 NOTE — LETTER
2021 4:23 PM    Ms. Alyce Mckee  [address] [facility] [address]      Dear Patient,    This letter is to remind you that as of  [facility] will no longer be sending out confirmation letters for remote transmissions through the [employer]. All letters in the future will be posted in an electronic medical records format therefore we highly encourage enrollment in [employer] patient's portal. Once enrolled you will have access to any letters we send as well as appointment information that can be found in your medical record. Our EP team would contact you via phone if there are significant abnormal findings so you can discuss with Dr. Rehan Bermudez further in office. Missed transmission letters will also be digitized in [employer] but we will continue to send those out through the mail as well. How to register for [employer]:    - Visit [email or web address]  - Click Create an Account  - Provide your name, address, and   - You will then be asked a short series of questions to verify your identity. This helps to ensure that no one else can access your information.   - If you have any further questions, please contact our office at [phone]. If you choose not to enroll in [employer] or do not have internet access, please kindly let device clinic know and we will accommodate your preference. We are grateful for your understanding and looking forward to this new, improved and more efficient way of communication.            Warm Regards,  [facility] Staff

## 2022-01-03 ENCOUNTER — VIRTUAL VISIT (OUTPATIENT)
Dept: INTERNAL MEDICINE CLINIC | Age: 87
End: 2022-01-03
Payer: MEDICARE

## 2022-01-03 DIAGNOSIS — Z79.899 ENCOUNTER FOR LONG-TERM (CURRENT) USE OF MEDICATIONS: ICD-10-CM

## 2022-01-03 DIAGNOSIS — R32 URINARY INCONTINENCE, UNSPECIFIED TYPE: ICD-10-CM

## 2022-01-03 DIAGNOSIS — E11.21 TYPE 2 DIABETES WITH NEPHROPATHY (HCC): Primary | ICD-10-CM

## 2022-01-03 DIAGNOSIS — E78.2 MIXED HYPERLIPIDEMIA: ICD-10-CM

## 2022-01-03 DIAGNOSIS — I10 ESSENTIAL HYPERTENSION, BENIGN: ICD-10-CM

## 2022-01-03 PROCEDURE — G8427 DOCREV CUR MEDS BY ELIG CLIN: HCPCS | Performed by: INTERNAL MEDICINE

## 2022-01-03 PROCEDURE — G8510 SCR DEP NEG, NO PLAN REQD: HCPCS | Performed by: INTERNAL MEDICINE

## 2022-01-03 PROCEDURE — 99213 OFFICE O/P EST LOW 20 MIN: CPT | Performed by: INTERNAL MEDICINE

## 2022-01-03 PROCEDURE — 1101F PT FALLS ASSESS-DOCD LE1/YR: CPT | Performed by: INTERNAL MEDICINE

## 2022-01-03 PROCEDURE — 1090F PRES/ABSN URINE INCON ASSESS: CPT | Performed by: INTERNAL MEDICINE

## 2022-01-03 PROCEDURE — G0463 HOSPITAL OUTPT CLINIC VISIT: HCPCS | Performed by: INTERNAL MEDICINE

## 2022-01-03 PROCEDURE — G8536 NO DOC ELDER MAL SCRN: HCPCS | Performed by: INTERNAL MEDICINE

## 2022-01-03 PROCEDURE — G8417 CALC BMI ABV UP PARAM F/U: HCPCS | Performed by: INTERNAL MEDICINE

## 2022-01-03 NOTE — PROGRESS NOTES
Alysha Lea is a 80 y.o. female who was seen by synchronous (real-time) audio-video technology on 1/3/2022. She confirmed that, for purposes of billing, this is a virtual visit with her provider for which we will submit a claim for reimbursement to her insurance company. She is aware that she will be responsible for any copays, coinsurance amounts or other amounts not covered by her insurance company. Do you accept - YES    This visit was completed was completed virtually using [employer]      Subjective:   Alysha Lea was seen for follow up of her diabetes mellitus type 2, hypertension and hyperlipidemia. -seen virtually due to inclement weather.     -last visit ws 04/26/2021  -lives independently. States that she has been having increased urinary incontinence. She has seen urology in the past, does not want to go back for follow up.   -has not been checking her glucose because her glucometer broke about a year ago. She does not want a replacement. Doesn't want to check her glucose  -she does not check her blood pressures at home.  -overall she states that she has been feeling well. She is not going anywhere due to [employer]. She is vaccinated and boostered. Her daughters who are also vaccinated visits her. She is no longer driving. Prior to Admission medications    Medication Sig Start Date End Date Taking? Authorizing Provider   amLODIPine (NORVASC) 2.5 mg tablet TAKE ONE TABLET BY MOUTH ONE TIME DAILY. Need office visit for further refills 11/16/21  Yes Paco Quinones MD   benazepriL (LOTENSIN) 10 mg tablet TAKE ONE TABLET BY MOUTH ONE TIME DAILY. Need office visit for further refills 11/16/21  Yes Paco Quinones MD   IBUPROFEN (ADVIL PO) Take 2 Tabs by mouth nightly. Yes Provider, Historical   cetirizine (ZYRTEC) 5 mg tablet Take 10 mg by mouth daily. Yes Provider, Historical   glucose blood VI test strips (blood glucose test) strip One touch ultra.  Use to test blood sugars once daily. DX:E11.21 4/26/21   Liam Esparza MD   glucose blood VI test strips (ASCENSIA AUTODISC VI, ONE TOUCH ULTRA TEST VI) strip Use to monitor blood sugar twice daily or as directed. 4/23/21   Liam Esparza MD   Blood-Glucose Meter monitoring kit One touch ultra. Use to test blood sugars once daily. DX:E11.21 5/15/20   Liam Esparza MD   lancets misc One touch ultra. Use to test blood sugars once daily. DX:E11.21 5/15/20   Liam Esparza MD   Blood-Glucose Meter monitoring kit Use daily as directed 5/5/20   Liam Esparza MD   Blood-Glucose Meter monitoring kit Use as directed 5/5/20   Liam Esparza MD       Allergies   Allergen Reactions    Ciprofloxacin Other (comments)     \"jittery\"    Lipitor [Atorvastatin] Myalgia    Mevacor [Lovastatin] Other (comments)     dyspepsia    Pcn [Penicillins] Hives    Zocor [Simvastatin] Myalgia         ROS - per HPI      Objective:     General: alert, cooperative, no distress   Mental  status: pe mental status_general use: normal mood, behavior, speech, dress, motor activity, and thought processes, able to follow commands   Eyes: EOM intact, normal sclera   Mouth: not examined   Neck: no visualized mass   Resp: PULM - obs findings: normal effort and no respiratory distress   Neuro: neuro - obs: no gross deficits   Musculoskeletal: normal ROM of neck   Skin: skin exam: no discoloration or lesions of concern on visible areas   Psychiatric: normal affect, no hallucinations       Assessment & Plan:   1. Type 2 diabetes with nephropathy ([facility] [address]  2. Hypertension, benign  3. Mixed hyperlipidemia  4. Encounter for long-term (current) use of medications  5. Urinary incontinence, unspecified type    Plan:  -patient declines for me to reorder a new glucometer. She states that she does not want to check her glucose at home. Discussed with her that her urinary frequency and incontinence may be related to elevated glucose.  She declined follow up with her urologist. She is using a pad.  -recommended patient be seen in office in 1-2 months for labs and also a physical exam.  Need check on her blood pressure and labs as well. CPT Codes 24789-62723 for Established Patients may apply to this Telehealth Visit  Time-based coding, delete if not needed: I spent at least 25 minutes with this established patient, and >50% of the time was spent counseling and/or coordinating care regarding diabetes, hypertension, hyperlipidemia, incontinence    Due to this being a TeleHealth evaluation, many elements of the physical examination are unable to be assessed. Pursuant to the emergency declaration under the [address] [place] [address], Community [facility] waiver authority and the [employer] Act, this Virtual  Visit was conducted, with patient's consent, to reduce the patient's risk of exposure to COVID-19 and provide continuity of care for an established patient. Services were provided through a video synchronous discussion virtually to substitute for in-person clinic visit. We discussed the expected course, resolution and complications of the diagnosis(es) in detail. Medication risks, benefits, costs, interactions, and alternatives were discussed as indicated. I advised her to contact the office if her condition worsens, changes or fails to improve as anticipated. She expressed understanding with the diagnosis(es) and plan.      Ernesto Metz MD

## 2022-01-03 NOTE — PROGRESS NOTES
Verified name and birth date for privacy precautions. Chart reviewed in preparation for today's visit.      Chief Complaint   Patient presents with    Medication Evaluation          Health Maintenance Due   Topic    Shingrix Vaccine Age 50> (1 of 2)    Eye Exam Retinal or Dilated     MICROALBUMIN Q1     Pneumococcal 65+ years (2 of 2 - PPSV23)    COVID-19 Vaccine (3 - Booster for Zmanda series)    Flu Vaccine (1)         Wt Readings from Last 3 Encounters:   04/26/21 199 lb 9.6 oz (90.5 kg)   03/23/21 228 lb (103.4 kg)   02/11/20 207 lb (93.9 kg)     Temp Readings from Last 3 Encounters:   04/26/21 97.5 °F (36.4 °C) (Temporal)   11/21/19 97.3 °F (36.3 °C) (Oral)   06/07/19 97.8 °F (36.6 °C)     BP Readings from Last 3 Encounters:   04/26/21 136/62   03/23/21 128/82   10/01/20 120/68     Pulse Readings from Last 3 Encounters:   04/26/21 84   03/23/21 62   10/01/20 65         Learning Assessment:  :     Learning Assessment 5/23/2019 3/14/2018 8/12/2015 4/2/2014   PRIMARY LEARNER Patient Patient Patient Patient   HIGHEST LEVEL OF EDUCATION - PRIMARY LEARNER  2 YEARS OF COLLEGE 2 YEARS OF COLLEGE SOME COLLEGE SOME COLLEGE   BARRIERS PRIMARY LEARNER NONE NONE NONE NONE   CO-LEARNER CAREGIVER No No No No   PRIMARY LANGUAGE ENGLISH ENGLISH ENGLISH ENGLISH    NEED - - No No   LEARNER PREFERENCE PRIMARY READING READING DEMONSTRATION READING     - DEMONSTRATION - -   LEARNING SPECIAL TOPICS - - no no   ANSWERED BY patinet  patient patient patient   RELATIONSHIP SELF SELF SELF SELF       Depression Screening:  :     3 most recent PHQ Screens 1/3/2022   Little interest or pleasure in doing things Not at all   Feeling down, depressed, irritable, or hopeless Not at all   Total Score PHQ 2 0   Trouble falling or staying asleep, or sleeping too much -   Feeling tired or having little energy -   Poor appetite, weight loss, or overeating -   Feeling bad about yourself - or that you are a failure or have let yourself or your family down -   Trouble concentrating on things such as school, work, reading, or watching TV -   Moving or speaking so slowly that other people could have noticed; or the opposite being so fidgety that others notice -   Thoughts of being better off dead, or hurting yourself in some way -   PHQ 9 Score -       Fall Risk Assessment:  :     Fall Risk Assessment, last 12 mths 1/3/2022   Able to walk? Yes   Fall in past 12 months? 0   Do you feel unsteady? 1   Are you worried about falling 0   Number of falls in past 12 months -   Fall with injury? -       Abuse Screening:  :     Abuse Screening Questionnaire 1/3/2022 5/23/2019 9/11/2018 9/13/2017 6/1/2017 8/12/2015 4/2/2014   Do you ever feel afraid of your partner? N N N N N N N   Are you in a relationship with someone who physically or mentally threatens you? N N N N N N N   Is it safe for you to go home?  [facility]

## 2022-03-07 ENCOUNTER — OFFICE VISIT (OUTPATIENT)
Dept: CARDIOLOGY CLINIC | Age: 87
End: 2022-03-07
Payer: MEDICARE

## 2022-03-07 DIAGNOSIS — Z95.0 CARDIAC PACEMAKER IN SITU: Primary | ICD-10-CM

## 2022-03-07 PROCEDURE — 93296 REM INTERROG EVL PM/IDS: CPT | Performed by: INTERNAL MEDICINE

## 2022-03-07 NOTE — LETTER
3/7/2022 9:21 AM    Ms. Romana Rohrer  [address] [phone]            This letter confirms that we have received your scheduled remote check of your implanted     device on 3-7-22  . Our EP team will contact you via phone if there are significant abnormal    findings. Your next in-clinic device check is scheduled for 5-24-22 at 2:00pm  .                   If you have any questions, please call [address] [place] [address] at [phone].                Sincerely,    Jory Carl MD [facility]

## 2022-03-10 ENCOUNTER — OFFICE VISIT (OUTPATIENT)
Dept: INTERNAL MEDICINE CLINIC | Age: 87
End: 2022-03-10
Payer: MEDICARE

## 2022-03-10 VITALS
HEIGHT: 64 IN | DIASTOLIC BLOOD PRESSURE: 67 MMHG | OXYGEN SATURATION: 98 % | RESPIRATION RATE: 15 BRPM | SYSTOLIC BLOOD PRESSURE: 103 MMHG | TEMPERATURE: 97.1 F | HEART RATE: 86 BPM | WEIGHT: 203.6 LBS | BODY MASS INDEX: 34.76 KG/M2

## 2022-03-10 DIAGNOSIS — E11.59 TYPE 2 DIABETES MELLITUS WITH OTHER CIRCULATORY COMPLICATION, WITHOUT LONG-TERM CURRENT USE OF INSULIN (HCC): Primary | ICD-10-CM

## 2022-03-10 DIAGNOSIS — Z23 ENCOUNTER FOR IMMUNIZATION: ICD-10-CM

## 2022-03-10 DIAGNOSIS — I10 ESSENTIAL HYPERTENSION, BENIGN: ICD-10-CM

## 2022-03-10 DIAGNOSIS — Z79.899 ENCOUNTER FOR LONG-TERM (CURRENT) USE OF MEDICATIONS: ICD-10-CM

## 2022-03-10 DIAGNOSIS — E78.2 MIXED HYPERLIPIDEMIA: ICD-10-CM

## 2022-03-10 DIAGNOSIS — I44.2 ATRIOVENTRICULAR BLOCK, COMPLETE (HCC): ICD-10-CM

## 2022-03-10 PROCEDURE — G8417 CALC BMI ABV UP PARAM F/U: HCPCS | Performed by: INTERNAL MEDICINE

## 2022-03-10 PROCEDURE — G8536 NO DOC ELDER MAL SCRN: HCPCS | Performed by: INTERNAL MEDICINE

## 2022-03-10 PROCEDURE — 1101F PT FALLS ASSESS-DOCD LE1/YR: CPT | Performed by: INTERNAL MEDICINE

## 2022-03-10 PROCEDURE — G0463 HOSPITAL OUTPT CLINIC VISIT: HCPCS | Performed by: INTERNAL MEDICINE

## 2022-03-10 PROCEDURE — 1090F PRES/ABSN URINE INCON ASSESS: CPT | Performed by: INTERNAL MEDICINE

## 2022-03-10 PROCEDURE — G8432 DEP SCR NOT DOC, RNG: HCPCS | Performed by: INTERNAL MEDICINE

## 2022-03-10 PROCEDURE — 99214 OFFICE O/P EST MOD 30 MIN: CPT | Performed by: INTERNAL MEDICINE

## 2022-03-10 PROCEDURE — 90732 PPSV23 VACC 2 YRS+ SUBQ/IM: CPT | Performed by: INTERNAL MEDICINE

## 2022-03-10 PROCEDURE — G8427 DOCREV CUR MEDS BY ELIG CLIN: HCPCS | Performed by: INTERNAL MEDICINE

## 2022-03-10 NOTE — PROGRESS NOTES
Assessment/Plan:     1. Type 2 diabetes   -encouraged reduction of alcohol use.   -glucose appears stable and controlled.   -check labs today. - CBC WITH AUTOMATED DIFF; Future  - METABOLIC PANEL, COMPREHENSIVE; Future  - HEMOGLOBIN A1C WITH EAG; Future  - MICROALBUMIN, UR, RAND W/ MICROALB/CREAT RATIO; Future    2. Hypertension, benign  -I evaluated and recommended to continue current doses of medications. 3. Mixed hyperlipidemia  -diet controlled     4. Encounter for long-term (current) use of medications      5. Atrioventricular block, complete (HCC)  -with pacemaker. Follows with Dr. Tressa Perera    6. Encounter for immunization  -Patient received pneumovax today without any complications.      - PNEUMOCOCCAL POLYSACCHARIDE VACCINE, 23-VALENT, ADULT OR IMMUNOSUPPRESSED PT DOSE,  - ADMIN PNEUMOCOCCAL VACCINE     Orders Placed This Encounter    Pneumococcal Polysaccharide vaccine, 23-Valent, Adult or Immunocompromised    CBC WITH AUTOMATED DIFF     Standing Status:   Future     Standing Expiration Date:   0/40/1544    METABOLIC PANEL, COMPREHENSIVE     Standing Status:   Future     Standing Expiration Date:   3/10/2023    HEMOGLOBIN A1C WITH EAG     Standing Status:   Future     Standing Expiration Date:   3/10/2023    MICROALBUMIN, UR, RAND W/ MICROALB/CREAT RATIO     Standing Status:   Future     Standing Expiration Date:   3/10/2023    ADMIN PNEUMOCOCCAL VACCINE  Medicare Injection Admin Charge        Follow-up Disposition:       Follow up 4 months              Subjective:      Dulce Maria Mueller is a 80 y.o. female who presents today for follow up of her diabetes mellitus type 2, hypertension and hyperlipidemia     Here with her daughter, Abel Hodgson    Since last visit 1/3/2022 (remote):  -glucose in range of 120-190.  -has urinary urgency. Has had evaluation with urologist in the past.  -drinking 1-2 alcoholic beverages every night.  -pacemaker checks are remote.      Patient Care Team:  -Dr. Tressa Perera - pacemaker, heart block         Objective: Wt Readings from Last 3 Encounters:   03/10/22 203 lb 9.6 oz (92.4 kg)   04/26/21 199 lb 9.6 oz (90.5 kg)   03/23/21 228 lb (103.4 kg)     BP Readings from Last 3 Encounters:   03/10/22 103/67   04/26/21 136/62   03/23/21 128/82     Visit Vitals  /67   Pulse 86   Temp 97.1 °F (36.2 °C) (Temporal)   Resp 15   Ht 5' 4\" (1.626 m)   Wt 203 lb 9.6 oz (92.4 kg)   LMP  (LMP Unknown)   SpO2 98%   BMI 34.95 kg/m²     General appearance: alert, cooperative, no distress, appears stated age  Head: Normocephalic, without obvious abnormality, atraumatic  Neck: supple, symmetrical, trachea midline, no adenopathy, no carotid bruit and no JVD  Lungs: clear to auscultation bilaterally  Heart: regular rate and rhythm  Extremities: varicose veins noted, edema 1+ nonpitting              Disclaimer:  Return if symptoms worsen or fail to improve. Advised patient to call back or return to office if symptoms worsen/change/persist.     Discussed expected course/resolution/complications of diagnosis in detail with patient. Medication risks/benefits/costs/interactions/alternatives discussed with patient. Patient was given an after visit summary which includes diagnoses, current medications, & vitals. Patient expressed understanding with the diagnosis and plan.

## 2022-03-10 NOTE — PATIENT INSTRUCTIONS
Vaccine Information Statement    Pneumococcal Polysaccharide Vaccine (PPSV23): What You Need to Know    Many Vaccine Information Statements are available in [place]ym[place] and other languages. See www.immunize.org/vis  Hojas de información sobre vacunas están disponibles en español y en muchos otros idiomas. Visite www.immunize.org/vis    1. Why get vaccinated? Pneumococcal polysaccharide vaccine (PPSV23) can prevent pneumococcal disease. Pneumococcal disease refers to any illness caused by pneumococcal bacteria. These bacteria can cause many types of illnesses, including pneumonia, which is an infection of the lungs. Pneumococcal bacteria are one of the most common causes of pneumonia. Besides pneumonia, pneumococcal bacteria can also cause:   Ear infections   Sinus infections   Meningitis (infection of the tissue covering the brain and spinal cord)   Bacteremia (bloodstream infection)    Anyone can get pneumococcal disease, but children under 3years of age, people with certain medical conditions, adults 72 years or older, and cigarette smokers are at the highest risk. Most pneumococcal infections are mild. However, some can result in long-term problems, such as brain damage or hearing loss. Meningitis, bacteremia, and pneumonia caused by pneumococcal disease can be fatal.     2. PPSV23     PPSV23 protects against 23 types of bacteria that cause pneumococcal disease. PPSV23 is recommended for:   All adults 72 years or older,   Anyone 2 years or older with certain medical conditions that can lead to an increased risk for pneumococcal disease. Most people need only one dose of PPSV23. A second dose of PPSV23, and another type of pneumococcal vaccine called PCV13, are recommended for certain high-risk groups. Your health care provider can give you more information.     People 65 years or older should get a dose of PPSV23 even if they have already gotten one or more doses of the vaccine before they turned 72.    3. Talk with your health care provider    Tell your vaccine provider if the person getting the vaccine:   Has had an allergic reaction after a previous dose of PPSV23, or has any severe, life-threatening allergies. In some cases, your health care provider may decide to postpone PPSV23 vaccination to a future visit. People with minor illnesses, such as a cold, may be vaccinated. People who are moderately or severely ill should usually wait until they recover before getting PPSV23. Your health care provider can give you more information. 4. Risks of a vaccine reaction     Redness or pain where the shot is given, feeling tired, fever, or muscle aches can happen after PPSV23. People sometimes faint after medical procedures, including vaccination. Tell your provider if you feel dizzy or have vision changes or ringing in the ears. As with any medicine, there is a very remote chance of a vaccine causing a severe allergic reaction, other serious injury, or death. 5. What if there is a serious problem? An allergic reaction could occur after the vaccinated person leaves the clinic. If you see signs of a severe allergic reaction (hives, swelling of the face and throat, difficulty breathing, a fast heartbeat, dizziness, or weakness), call 9-1-1 and get the person to the nearest hospital.    For other signs that concern you, call your health care provider. Adverse reactions should be reported to the Vaccine Adverse Event Reporting System (VAERS). Your health care provider will usually file this report, or you can do it yourself. Visit the VAERS website at www.vaers. hhs.gov or call 7-885.481.9736. VAERS is only for reporting reactions, and VAERS staff do not give medical advice. 6. How can I learn more?  Ask your health care provider.  Call your local or state health department.    Contact the Centers for Disease Control and Prevention (CDC):  - Call 2-282.841.5964 (2-507-OSY-INFO) or  - Visit CDCs website at www.cdc.gov/vaccines    Vaccine Information Statement   PPSV23   10/30/2019    Department of Health and [facility] for Disease Control and Prevention    Office Use Only

## 2022-03-18 PROBLEM — I47.19 PAT (PAROXYSMAL ATRIAL TACHYCARDIA): Status: ACTIVE | Noted: 2017-12-19

## 2022-03-18 PROBLEM — I47.1 PAT (PAROXYSMAL ATRIAL TACHYCARDIA) (HCC): Status: ACTIVE | Noted: 2017-12-19

## 2022-03-19 PROBLEM — E11.21 TYPE 2 DIABETES WITH NEPHROPATHY (HCC): Status: ACTIVE | Noted: 2019-05-23

## 2022-06-12 NOTE — PROGRESS NOTES
Cardiac Electrophysiology Office Note     Subjective:      Qian Rosario is a 80 y.o. patient who presents for follow up, is s/p Medtronic dual chamber pacemaker (gen change 08/18/2017, leads 12/18/1996). She reports doing well, denies any chest pain, palpitations, SOB, PND, orthopnea, syncope, or edema. No NSVT on today's device check, but has been noted on prior checks. Lexiscan cardiolite stress test in 12/2007 showed LVEF 76% with no ischemia. BP controlled. Previous:  PAT noted via device checks. Medtronic dual chamber pacemaker (gen change 08/18/2017, leads 12/18/1996). RV pacing threshold chronically elevated. Problem List  Date Reviewed: 3/10/2022          Codes Class Noted    Type 2 diabetes with nephropathy ([facility] ICD-10-CM: E11.21  ICD-9-CM: 250.40, 583.81  5/23/2019        PAT (paroxysmal atrial tachycardia) ([facility] ICD-10-CM: I47.1  ICD-9-CM: 427.0  12/19/2017        Diabetic eye exam ([facility] (Chronic) ICD-10-CM: Z01.00, E11.9  ICD-9-CM: V72.0, 250.00  8/26/2016    Overview Addendum 9/20/2018  5:55 PM by MD KATELYN Hubbard, Dr. Charlotte Pickett, 6/7/18             Advance directive discussed with patient (Chronic) ICD-10-CM: Z71.89  ICD-9-CM: V65.49  4/13/2016    Overview Signed 4/13/2016 12:45 PM by Mitzy Seymour MD     4/13/2016 . Has one established and will bring in for chart. Diabetes type 2, controlled ([facility] ICD-10-CM: E11.9  ICD-9-CM: 250.00  10/10/2014    Overview Signed 10/10/2014  1:49 PM by Mitzy Seymour MD     Diet controlled. Colon cancer screening (Chronic) ICD-10-CM: Z12.11  ICD-9-CM: V76.51  9/5/2012    Overview Addendum 3/29/2013 11:34 AM by MD Dr. Kat Hubbard. 2010, normal.  Pt declines to have another.              History of screening mammography (Chronic) ICD-10-CM: X86.32  ICD-9-CM: V15.89  6/27/2012    Overview Addendum 7/21/2017  5:09 PM by Mitzy Seymour MD     7/19/17, [facility]             S/P FERNANDO-BSO ICD-10-CM: Z90.710, F14.944, Z90.79  ICD-9-CM: V88.01  3/2/2012    Overview Signed 3/2/2012  2:40 PM by Tyrone Ga MD     1977             Allergy to environmental factors ICD-10-CM: Z91.09  ICD-9-CM: V15.09  3/2/2012        Rotator cuff tear, right ICD-10-CM: M75.101  ICD-9-CM: 840.4  3/2/2012        S/P right cataract extraction ICD-10-CM: Z98.41  ICD-9-CM: V45.61  3/2/2012    Overview Signed 3/2/2012  2:41 PM by MD Dr. Hossein Brady 6/7/10, with lens implant             Atrioventricular block, complete ([facility] ICD-10-CM: I44.2  ICD-9-CM: 426.0  4/4/2011    Overview Signed 3/2/2012  2:40 PM by Tyrone Ga MD     Pacemaker 1996, replaced 3/08              hyperlipidemia ICD-10-CM: E78.5  ICD-9-CM: 272.4  4/4/2011        Hypertension, benign ICD-10-CM: I10  ICD-9-CM: 401.1  4/4/2011        Cardiac pacemaker in situ ICD-10-CM: Z95.0  ICD-9-CM: V45.01  4/4/2011    Overview Signed 8/18/2017  8:15 AM by Robinson Vogt NP     Generator change 8/18/17 Dr. Abraham Liao                   Current Outpatient Medications   Medication Sig Dispense Refill    amLODIPine (NORVASC) 2.5 mg tablet TAKE ONE TABLET BY MOUTH ONE TIME DAILY 90 Tablet 1    benazepriL (LOTENSIN) 10 mg tablet TAKE ONE TABLET BY MOUTH ONE TIME DAILY 90 Tablet 1    glucose blood VI test strips (blood glucose test) strip One touch ultra. Use to test blood sugars once daily. DX:E11.21 100 Strip 3    glucose blood VI test strips (ASCENSIA AUTODISC VI, ONE TOUCH ULTRA TEST VI) strip Use to monitor blood sugar twice daily or as directed. 100 Strip 0    Blood-Glucose Meter monitoring kit One touch ultra. Use to test blood sugars once daily. DX:E11.21 1 Kit 0    lancets misc One touch ultra. Use to test blood sugars once daily. DX:E11.21 100 Each 11    Blood-Glucose Meter monitoring kit Use daily as directed 1 Kit 0    Blood-Glucose Meter monitoring kit Use as directed 1 Kit 0    IBUPROFEN (ADVIL PO) Take 2 Tabs by mouth nightly.       cetirizine (ZYRTEC) 5 mg tablet Take 10 mg by mouth daily. 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  Menopause     LMP-?    Rotator cuff tear 8/2011    right    S/P FERNANDO-BSO 3/2/2012     Past Surgical History:   Procedure Laterality Date    ENDOSCOPY, COLON, DIAGNOSTIC  11/18/08    polyp (Brand)    HX HEENT  6/7/10    ELIAZAR O.D.    HX ORTHOPAEDIC  1992    R Elbow Fx - ORIF    HX FERNANDO AND BSO  1975    ID CARDIAC SURG PROCEDURE UNLIST  03/08    Pacemaker Battery Replacement    ID CARDIAC SURG PROCEDURE UNLIST  1996    Pacemaker Implant    ID CHEST SURGERY PROCEDURE UNLISTED      Pacemaker    ID REMVL PERM PM PLS GEN W/REPL PLSE GEN 2 LEAD SYS  8/18/2017          Family History   Problem Relation Age of Onset    Cancer Mother     Stroke Father      Social History     Tobacco Use    Smoking status: Former Smoker     Quit date: 4/8/2007     Years since quitting: 15.1    Smokeless tobacco: Never Used   Substance Use Topics    Alcohol use: Yes     Alcohol/week: 5.8 - 8.3 standard drinks     Types: 7 - 10 Standard drinks or equivalent per week     Comment: pt states she drinks vodka and burbon everyday        Review of Systems:   Constitutional: Negative for fever, chills, weight loss,malaise/fatigue. HEENT: Negative for nosebleeds, vision changes. Respiratory: Negative for cough, hemoptysis.   Cardiovascular: Negative for chest pain, palpitations, orthopnea, claudication, leg swelling, syncope, and PND. Gastrointestinal: Negative for nausea, vomiting, diarrhea, blood in stool and melena. Genitourinary: Negative for dysuria, and hematuria. Musculoskeletal: Negative for myalgias, + arthralgia. Skin: Negative for rash. Heme: Does not bleed or bruise easily. Neurological: Negative for speech change and focal weakness. Objective:     Visit Vitals  /80 (BP 1 Location: Left upper arm, BP Patient Position: Sitting, BP Cuff Size: Adult)   Pulse 92   Resp 18   Ht 5' 4\" (1.626 m)   Wt 203 lb (92.1 kg)   LMP  (LMP Unknown)   SpO2 96%   BMI 34.84 kg/m²      Physical Exam:   Constitutional: Well-developed and well-nourished. No respiratory distress. Head: Normocephalic and atraumatic. Eyes: Pupils are equal, round. ENT: Hearing grossly normal.  Neck: Supple. No JVD present. Cardiovascular: Normal rate, regular rhythm. Exam reveals no gallop and no friction rub. No murmur heard. Pulmonary/Chest: Effort normal and breath sounds normal. No wheezes. Abdominal: Soft, + obesity  Musculoskeletal: Moves extremities independently. Vasc/lymphatic: No edema. Neurological: Alert, oriented. Skin: Warm & dry. Right sided pacer site well healed. Psychiatric: Normal mood and affect. Behavior is normal. Judgment and thought content normal.         Assessment/Plan:     Imaging/Studies:  Lexiscan cardiolite stress (12/2007): LVEF 76%, no ischemia. ICD-10-CM ICD-9-CM    1. Pacemaker  Z95.0 V45.01    2. PAT (paroxysmal atrial tachycardia) ([facility])  I47.1 427.0 ECHO ADULT COMPLETE   3. NSVT (nonsustained ventricular tachycardia) ([facility])  I47.2 427.1 ECHO ADULT COMPLETE   4. AV block, 3rd degree ([facility])  I44.2 426.0    5. Primary hypertension  I10 401.9           Medtronic dual chamber pacemaker (gen change 08/18/2017, leads 12/18/1996): Device check today shows proper lead & generator function. Generator longevity estimated 7 yrs, 5 mos. Pacer dependent.   Since 03/06/2022, no episodes noted. PAT: Noted via device checks, asymptomatic due to pacer dependency. NSVT: Noted via device checks, asymptomatic. Lexiscan cardiolite stress test in 12/2007 showed no ischemia. She has declined repeat stress test, but will order 2D echo. HTN: BP controlled. Continue current medication regimen. Remote pacer checks q 3 months. Follow up with Dr. Olman Horn in 1 year. Future Appointments   Date Time Provider Caren Cotto   7/14/2022 12:40 PM MD HELGA Stern BS AMB   7/15/2022  1:00 PM ECHO, RHIANNA RODGERS BS AMB   10/10/2022 10:30 AM REMOTE1, RHIANNA RODGERS BS AMB   1/16/2023 10:15 AM REMOTE1, RHIANNA RODGERS BS AMB   4/19/2023  9:00 AM REMOTE1, RHIANNA RODGERS BS AMB   7/18/2023  2:00 PM PACEMAKER3, RHIANNA RODGERS BS AMB   7/18/2023  2:20 PM Denver Hiss, MD RODGERS BS AMB     Thank you for involving me in this patient's care and please call with further concerns or questions.     ALESSIO Espinoza  [facility] [place]  06/14/22

## 2022-06-14 ENCOUNTER — OFFICE VISIT (OUTPATIENT)
Dept: CARDIOLOGY CLINIC | Age: 87
End: 2022-06-14
Payer: MEDICARE

## 2022-06-14 ENCOUNTER — CLINICAL SUPPORT (OUTPATIENT)
Dept: CARDIOLOGY CLINIC | Age: 87
End: 2022-06-14
Payer: MEDICARE

## 2022-06-14 VITALS
SYSTOLIC BLOOD PRESSURE: 122 MMHG | BODY MASS INDEX: 34.66 KG/M2 | HEART RATE: 92 BPM | WEIGHT: 203 LBS | OXYGEN SATURATION: 96 % | HEIGHT: 64 IN | DIASTOLIC BLOOD PRESSURE: 80 MMHG | RESPIRATION RATE: 18 BRPM

## 2022-06-14 DIAGNOSIS — I44.2 AV BLOCK, 3RD DEGREE (HCC): ICD-10-CM

## 2022-06-14 DIAGNOSIS — I47.1 PAT (PAROXYSMAL ATRIAL TACHYCARDIA) (HCC): ICD-10-CM

## 2022-06-14 DIAGNOSIS — Z95.0 CARDIAC PACEMAKER IN SITU: Primary | ICD-10-CM

## 2022-06-14 DIAGNOSIS — I47.29 NSVT (NONSUSTAINED VENTRICULAR TACHYCARDIA): ICD-10-CM

## 2022-06-14 DIAGNOSIS — I10 PRIMARY HYPERTENSION: ICD-10-CM

## 2022-06-14 DIAGNOSIS — Z95.0 PACEMAKER: Primary | ICD-10-CM

## 2022-06-14 PROCEDURE — 1123F ACP DISCUSS/DSCN MKR DOCD: CPT | Performed by: NURSE PRACTITIONER

## 2022-06-14 PROCEDURE — G8432 DEP SCR NOT DOC, RNG: HCPCS | Performed by: NURSE PRACTITIONER

## 2022-06-14 PROCEDURE — 93280 PM DEVICE PROGR EVAL DUAL: CPT | Performed by: INTERNAL MEDICINE

## 2022-06-14 PROCEDURE — 93288 INTERROG EVL PM/LDLS PM IP: CPT | Performed by: INTERNAL MEDICINE

## 2022-06-14 PROCEDURE — 99214 OFFICE O/P EST MOD 30 MIN: CPT | Performed by: NURSE PRACTITIONER

## 2022-06-14 PROCEDURE — G8536 NO DOC ELDER MAL SCRN: HCPCS | Performed by: NURSE PRACTITIONER

## 2022-06-14 PROCEDURE — G0463 HOSPITAL OUTPT CLINIC VISIT: HCPCS | Performed by: NURSE PRACTITIONER

## 2022-06-14 PROCEDURE — G8427 DOCREV CUR MEDS BY ELIG CLIN: HCPCS | Performed by: NURSE PRACTITIONER

## 2022-06-14 PROCEDURE — 1090F PRES/ABSN URINE INCON ASSESS: CPT | Performed by: NURSE PRACTITIONER

## 2022-06-14 PROCEDURE — G8417 CALC BMI ABV UP PARAM F/U: HCPCS | Performed by: NURSE PRACTITIONER

## 2022-06-14 PROCEDURE — 1101F PT FALLS ASSESS-DOCD LE1/YR: CPT | Performed by: NURSE PRACTITIONER

## 2022-06-20 ENCOUNTER — HOSPITAL ENCOUNTER (EMERGENCY)
Age: 87
Discharge: HOME | End: 2022-06-20
Attending: EMERGENCY MEDICINE
Payer: MEDICARE

## 2022-06-20 ENCOUNTER — APPOINTMENT (OUTPATIENT)
Dept: CT IMAGING | Age: 87
End: 2022-06-20
Attending: EMERGENCY MEDICINE
Payer: MEDICARE

## 2022-06-20 VITALS
OXYGEN SATURATION: 95 % | BODY MASS INDEX: 32.56 KG/M2 | SYSTOLIC BLOOD PRESSURE: 123 MMHG | WEIGHT: 207.45 LBS | RESPIRATION RATE: 18 BRPM | TEMPERATURE: 97.7 F | HEART RATE: 79 BPM | HEIGHT: 67 IN | DIASTOLIC BLOOD PRESSURE: 64 MMHG

## 2022-06-20 DIAGNOSIS — F32.A DEPRESSION, UNSPECIFIED DEPRESSION TYPE: Primary | ICD-10-CM

## 2022-06-20 DIAGNOSIS — R45.4 ANGER: ICD-10-CM

## 2022-06-20 DIAGNOSIS — F10.920 ALCOHOLIC INTOXICATION WITHOUT COMPLICATION (HCC): ICD-10-CM

## 2022-06-20 LAB
ALBUMIN SERPL-MCNC: 3.9 G/DL (ref 3.5–5)
ALBUMIN/GLOB SERPL: 1.2 {RATIO} (ref 1.1–2.2)
ALP SERPL-CCNC: 77 U/L (ref 45–117)
ALT SERPL-CCNC: 24 U/L (ref 12–78)
AMMONIA PLAS-SCNC: <10 UMOL/L
AMPHET UR QL SCN: NEGATIVE
ANION GAP SERPL CALC-SCNC: 11 MMOL/L (ref 5–15)
APAP SERPL-MCNC: <2 UG/ML (ref 10–30)
APPEARANCE UR: CLEAR
AST SERPL-CCNC: 21 U/L (ref 15–37)
BACTERIA URNS QL MICRO: NEGATIVE /HPF
BARBITURATES UR QL SCN: NEGATIVE
BASOPHILS # BLD: 0.1 K/UL (ref 0–0.1)
BASOPHILS NFR BLD: 1 % (ref 0–1)
BENZODIAZ UR QL: NEGATIVE
BILIRUB SERPL-MCNC: 0.4 MG/DL (ref 0.2–1)
BILIRUB UR QL: NEGATIVE
BUN SERPL-MCNC: 17 MG/DL (ref 6–20)
BUN/CREAT SERPL: 15 (ref 12–20)
CALCIUM SERPL-MCNC: 9.2 MG/DL (ref 8.5–10.1)
CANNABINOIDS UR QL SCN: NEGATIVE
CHLORIDE SERPL-SCNC: 103 MMOL/L (ref 97–108)
CO2 SERPL-SCNC: 26 MMOL/L (ref 21–32)
COCAINE UR QL SCN: NEGATIVE
COLOR UR: NORMAL
CREAT SERPL-MCNC: 1.17 MG/DL (ref 0.55–1.02)
DIFFERENTIAL METHOD BLD: ABNORMAL
DRUG SCRN COMMENT,DRGCM: NORMAL
EOSINOPHIL # BLD: 0.1 K/UL (ref 0–0.4)
EOSINOPHIL NFR BLD: 1 % (ref 0–7)
EPITH CASTS URNS QL MICRO: NORMAL /LPF
ERYTHROCYTE [DISTWIDTH] IN BLOOD BY AUTOMATED COUNT: 12.8 % (ref 11.5–14.5)
ETHANOL SERPL-MCNC: 35 MG/DL
GLOBULIN SER CALC-MCNC: 3.3 G/DL (ref 2–4)
GLUCOSE BLD STRIP.AUTO-MCNC: 140 MG/DL (ref 65–117)
GLUCOSE SERPL-MCNC: 174 MG/DL (ref 65–100)
GLUCOSE UR STRIP.AUTO-MCNC: NEGATIVE MG/DL
HCT VFR BLD AUTO: 43.9 % (ref 35–47)
HGB BLD-MCNC: 14.3 G/DL (ref 11.5–16)
HGB UR QL STRIP: NEGATIVE
IMM GRANULOCYTES # BLD AUTO: 0 K/UL (ref 0–0.04)
IMM GRANULOCYTES NFR BLD AUTO: 1 % (ref 0–0.5)
KETONES UR QL STRIP.AUTO: NEGATIVE MG/DL
LEUKOCYTE ESTERASE UR QL STRIP.AUTO: NEGATIVE
LYMPHOCYTES # BLD: 1.4 K/UL (ref 0.8–3.5)
LYMPHOCYTES NFR BLD: 18 % (ref 12–49)
MAGNESIUM SERPL-MCNC: 2.3 MG/DL (ref 1.6–2.4)
MCH RBC QN AUTO: 30.6 PG (ref 26–34)
MCHC RBC AUTO-ENTMCNC: 32.6 G/DL (ref 30–36.5)
MCV RBC AUTO: 93.8 FL (ref 80–99)
METHADONE UR QL: NEGATIVE
MONOCYTES # BLD: 0.8 K/UL (ref 0–1)
MONOCYTES NFR BLD: 10 % (ref 5–13)
NEUTS SEG # BLD: 5.3 K/UL (ref 1.8–8)
NEUTS SEG NFR BLD: 69 % (ref 32–75)
NITRITE UR QL STRIP.AUTO: NEGATIVE
NRBC # BLD: 0 K/UL (ref 0–0.01)
NRBC BLD-RTO: 0 PER 100 WBC
OPIATES UR QL: NEGATIVE
PCP UR QL: NEGATIVE
PH UR STRIP: 6 [PH] (ref 5–8)
PLATELET # BLD AUTO: 179 K/UL (ref 150–400)
PMV BLD AUTO: 11.5 FL (ref 8.9–12.9)
POTASSIUM SERPL-SCNC: 3.7 MMOL/L (ref 3.5–5.1)
PROT SERPL-MCNC: 7.2 G/DL (ref 6.4–8.2)
PROT UR STRIP-MCNC: NEGATIVE MG/DL
RBC # BLD AUTO: 4.68 M/UL (ref 3.8–5.2)
RBC #/AREA URNS HPF: NORMAL /HPF (ref 0–5)
SALICYLATES SERPL-MCNC: <1.7 MG/DL (ref 2.8–20)
SERVICE CMNT-IMP: ABNORMAL
SODIUM SERPL-SCNC: 140 MMOL/L (ref 136–145)
SP GR UR REFRACTOMETRY: 1.01 (ref 1–1.03)
TROPONIN-HIGH SENSITIVITY: 8 NG/L (ref 0–51)
UR CULT HOLD, URHOLD: NORMAL
UROBILINOGEN UR QL STRIP.AUTO: 0.2 EU/DL (ref 0.2–1)
WBC # BLD AUTO: 7.6 K/UL (ref 3.6–11)
WBC URNS QL MICRO: NORMAL /HPF (ref 0–4)

## 2022-06-20 PROCEDURE — 80143 DRUG ASSAY ACETAMINOPHEN: CPT

## 2022-06-20 PROCEDURE — 82962 GLUCOSE BLOOD TEST: CPT

## 2022-06-20 PROCEDURE — 82077 ASSAY SPEC XCP UR&BREATH IA: CPT

## 2022-06-20 PROCEDURE — 70450 CT HEAD/BRAIN W/O DYE: CPT

## 2022-06-20 PROCEDURE — 80307 DRUG TEST PRSMV CHEM ANLYZR: CPT

## 2022-06-20 PROCEDURE — 80179 DRUG ASSAY SALICYLATE: CPT

## 2022-06-20 PROCEDURE — 85025 COMPLETE CBC W/AUTO DIFF WBC: CPT

## 2022-06-20 PROCEDURE — 80053 COMPREHEN METABOLIC PANEL: CPT

## 2022-06-20 PROCEDURE — 83735 ASSAY OF MAGNESIUM: CPT

## 2022-06-20 PROCEDURE — 82140 ASSAY OF AMMONIA: CPT

## 2022-06-20 PROCEDURE — 84484 ASSAY OF TROPONIN QUANT: CPT

## 2022-06-20 PROCEDURE — 93005 ELECTROCARDIOGRAM TRACING: CPT

## 2022-06-20 PROCEDURE — 36415 COLL VENOUS BLD VENIPUNCTURE: CPT

## 2022-06-20 PROCEDURE — 81001 URINALYSIS AUTO W/SCOPE: CPT

## 2022-06-20 PROCEDURE — 99284 EMERGENCY DEPT VISIT MOD MDM: CPT

## 2022-06-20 NOTE — ED TRIAGE NOTES
Patient arrives via wheelchair with complaints of repetitive speech and not acting herself since 0615. Daughter states she spoke to patient at [phone] and was acting baseline then. . Patient alert and oriented at this time. Speaking in clear complete sentences. Dominique SOARES at bedside.

## 2022-06-21 LAB
ATRIAL RATE: 71 BPM
CALCULATED P AXIS, ECG09: 84 DEGREES
CALCULATED R AXIS, ECG10: -83 DEGREES
CALCULATED T AXIS, ECG11: 86 DEGREES
DIAGNOSIS, 93000: NORMAL
P-R INTERVAL, ECG05: 160 MS
Q-T INTERVAL, ECG07: 466 MS
QRS DURATION, ECG06: 174 MS
QTC CALCULATION (BEZET), ECG08: 506 MS
VENTRICULAR RATE, ECG03: 71 BPM

## 2022-06-21 NOTE — ED NOTES
Discharge instructions provided. Pt verbalized understanding. Opportunity provided for questions. Pt discharged with daughters.

## 2022-06-21 NOTE — ED PROVIDER NOTES
78-year-old female presents to the emergency department with her daughters who state that she has not been acting like herself since about 615 this evening. Her daughter states that she spoke to the patient at around [phone] and seemed to be at her baseline and then. This evening patient became agitated with her daughter at the bedside and they state that she was repeating herself more than she normally does. History of dementia, alert and oriented GCS 15 on arrival.  She states that she feels shaky almost like her blood sugar is low but on arrival her blood sugar is 140. She denies any headache, numbness, weakness, chest pain, shortness of breath, or any other medical concerns. On further discussion she states that she is feeling depressed. She stated previously during the argument that she wished that she was dead but denies any SI or plan to harm herself, no HI or AVH. She denies taking any medications for her mental health and does not follow with a mental health provider. No history of prior mental health admissions. 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     Menopause     LMP-?    Rotator cuff tear 8/2011    right    S/P FERNANDO-BSO 3/2/2012       Past Surgical History:   Procedure Laterality Date    ENDOSCOPY, COLON, DIAGNOSTIC  11/18/08    polyp (Brand)    HX HEENT  6/7/10    ELIAZAR O.D.    HX ORTHOPAEDIC  1992    R Elbow Fx - ORIF    HX FERNANDO AND BSO  1975    CO CARDIAC SURG PROCEDURE UNLIST  03/08    Pacemaker Battery Replacement    CO CARDIAC SURG PROCEDURE UNLIST  1996    Pacemaker Implant    CO CHEST SURGERY PROCEDURE UNLISTED Pacemaker    CA REMVL PERM PM PLS GEN W/REPL PLSE GEN 2 LEAD SYS  8/18/2017              Family History:   Problem Relation Age of Onset    Cancer Mother     Stroke Father        Social History     Socioeconomic History    Marital status:      Spouse name: Not on file    Number of children: Not on file    Years of education: Not on file    Highest education level: Not on file   Occupational History    Not on file   Tobacco Use    Smoking status: Former Smoker     Quit date: 4/8/2007     Years since quitting: 15.2    Smokeless tobacco: Never Used   Vaping Use    Vaping Use: Never used   Substance and Sexual Activity    Alcohol use: Yes     Alcohol/week: 5.8 - 8.3 standard drinks     Types: 7 - 10 Standard drinks or equivalent per week     Comment: pt states she drinks vodka and burbon everyday    Drug use: No    Sexual activity: Never   Other Topics Concern    Not on file   Social History Narrative    Not on file     Social Determinants of Health     Financial Resource Strain:     Difficulty of Paying Living Expenses: Not on file   Food Insecurity:     Worried About Running Out of Food in the Last Year: Not on file    Michelle of Food in the Last Year: Not on file   Transportation Needs:     Lack of Transportation (Medical): Not on file    Lack of Transportation (Non-Medical):  Not on file   Physical Activity:     Days of Exercise per Week: Not on file    Minutes of Exercise per Session: Not on file   Stress:     Feeling of Stress : Not on file   Social Connections:     Frequency of Communication with Friends and Family: Not on file    Frequency of Social Gatherings with Friends and Family: Not on file    Attends Faith Services: Not on file    Active Member of Clubs or Organizations: Not on file    Attends Club or Organization Meetings: Not on file    Marital Status: Not on file   Intimate Partner Violence:     Fear of Current or Ex-Partner: Not on file    Emotionally Abused: Not on file    Physically Abused: Not on file    Sexually Abused: Not on file   Housing Stability:     Unable to Pay for Housing in the Last Year: Not on file    Number of Places Lived in the Last Year: Not on file    Unstable Housing in the Last Year: Not on file         ALLERGIES: Ciprofloxacin, Lipitor [atorvastatin], Mevacor [lovastatin], Pcn [penicillins], and Zocor [simvastatin]    Review of Systems   Constitutional: Positive for activity change. Negative for appetite change, chills and fever. HENT: Negative for congestion, rhinorrhea, sinus pressure, sneezing and sore throat. Eyes: Negative for photophobia and visual disturbance. Respiratory: Negative for cough and shortness of breath. Cardiovascular: Negative for chest pain. Gastrointestinal: Negative for abdominal pain, blood in stool, constipation, diarrhea, nausea and vomiting. Genitourinary: Negative for difficulty urinating, dysuria, flank pain, frequency, hematuria, menstrual problem, urgency, vaginal bleeding and vaginal discharge. Musculoskeletal: Negative for arthralgias, back pain, myalgias and neck pain. Skin: Negative for rash and wound. Neurological: Negative for dizziness, seizures, syncope, facial asymmetry, speech difficulty, weakness, light-headedness, numbness and headaches. Psychiatric/Behavioral: Positive for dysphoric mood and suicidal ideas. Negative for self-injury. The patient is nervous/anxious. All other systems reviewed and are negative. Vitals:    06/20/22 1950 06/20/22 2000 06/20/22 2023   BP: 127/64 (!) 133/59 123/64   Pulse: 71 70 75   Resp: 16 15 26   Temp: 97.7 °F (36.5 °C)     SpO2: 97% 95% 96%   Weight: 94.1 kg (207 lb 7.3 oz)     Height: 5' 7\" (1.702 m)              Physical Exam  Vitals and nursing note reviewed. Constitutional:       General: She is not in acute distress. Appearance: Normal appearance. She is well-developed. She is obese. She is not diaphoretic.    HENT:      Head: Normocephalic and atraumatic. Nose: Nose normal.   Eyes:      Extraocular Movements: Extraocular movements intact. Conjunctiva/sclera: Conjunctivae normal.      Pupils: Pupils are equal, round, and reactive to light. Cardiovascular:      Rate and Rhythm: Normal rate and regular rhythm. Heart sounds: Normal heart sounds. Pulmonary:      Effort: Pulmonary effort is normal.      Breath sounds: Normal breath sounds. Abdominal:      General: There is no distension. Palpations: Abdomen is soft. Tenderness: There is no abdominal tenderness. Musculoskeletal:         General: No tenderness. Cervical back: Neck supple. Skin:     General: Skin is warm and dry. Neurological:      General: No focal deficit present. Mental Status: She is alert and oriented to person, place, and time. Cranial Nerves: No cranial nerve deficit. Sensory: No sensory deficit. Motor: No weakness. Coordination: Coordination normal.      Comments: Intact sensation, 5/5 strength in all 4 extremities, intact finger to nose, neg pronator drift, fluent speech, CN intact. Psychiatric:         Attention and Perception: Attention normal.         Mood and Affect: Mood is anxious and depressed. Speech: Speech normal.         Behavior: Behavior normal. Behavior is cooperative. Thought Content: Thought content does not include homicidal or suicidal ideation. MDM   [de-identified] female presents with anger and depression and anxiety. States that she has not been acting right. Patient admits to EtOH use today. CT head shows no acute intracranial abnormalities, does show some mild white matter disease secondary to chronic small vessel changes. UA negative, UDS negative, labs returned showing trace elevated EtOH level, but otherwise reassuring, feel that EtOH likely significantly contributed to her symptoms earlier.     [facility] was consulted and saw the patient at the bedside. Provided her with outpatient resources. No indication for admission at this time. Symptoms resolved while in the ED as she was talking with her family. Recommended outpatient follow-up for further evaluation and return precautions were given for worsening or concerns. Procedures    2014 EKG shows atrial sensed ventricular paced rhythm with a rate of 71 bpm with no acute ST or T wave abnormalities suggestive of ischemia.

## 2022-06-21 NOTE — ED NOTES
Pt's daughter reported prior to arrival patient had said something along the lines of \"I wish I was dead\". Patient reports she only said that in anger in the moment but that she is not suicidal. I spoke with the patient in private, away from daughters. Pt reports being depressed but denies ever being suicidal or having any thoughts of wanting to hurt herself or anyone. CSSRS Screening done. Information discussed with Dr. Víctor Rodriguez. [facility] consult placed but pt does not need suicide precautions as patient is not suicidal at this time, and pt's daughters remain in the room.

## 2022-06-21 NOTE — BSMART NOTE
Comprehensive Assessment Form Part 1      Section I - Disposition    DDx; No current or previous mental health diagnosis per Pt's report      The Medical Doctor to Psychiatrist conference was not completed. The Medical Doctor is in agreement with Psychiatrist disposition because of (reason) Pt not meeting criteria for voluntary inpatient hospitalization . The plan is discharge Pt with resources and refer to care management  The on-call Psychiatrist consulted was /A  The admitting Psychiatrist will be Dr. Winston Veliz. The admitting Diagnosis is N/A. The Payor source is [facility] [address] MEDICARE PART A & B  BSMART assessment completed, and suicide risk level noted to be No Risk Primary Nurse Rob Welch  and Physician Lu Perera notified. Concerns not observed. Security/Off- has not been notified. Section II - Integrated Summary  Summary:     Per Ed Triage Note:  Pt's daughter reported prior to arrival patient had said something along the lines of \"I wish I was dead\". Patient reports she only said that in anger in the moment but that she is not suicidal. I spoke with the patient in private, away from daughters. Pt reports being depressed but denies ever being suicidal or having any thoughts of wanting to hurt herself or anyone. CSSRS Screening done. Information discussed with Dr. Lu Perera. BSMART consult placed but pt does not need suicide precautions as patient is not suicidal at this time, and pt's daughters remain in the room. Pt is 7 y/o women who was transported to [facility] by daughters(May and Hilary Smalls) who were present at bedside during interview. Pt provided consent for writer to speak with daughters present in the room and reported \"I'm fine, this is a medical problem with my sugar\". Pt appeared A&Ox4 denying SI,HI, AH/VH, sleep disturbance, or concerns wit appetite.  Pt appeared irritable and frustrated to speak with writer, due to her reporting \"I don't have any mental health problems, I have a bad case going on with my blood sugar\". Pt reported to \"plan on going home to night if the doctor medical clears me \". Pt avoided discussion regarding mental health reporting \"I've never had those kinds of problems \" and \"That's not the case\". Writer encouraged all members in the room to express any concerns, although both daughters and pt reported no current mental health concerns but instead plans of talking as a family regarding in-home care. Pt not interested in seeking admission but did accept resources for senior connection, care management, and counseling. Pt appeared future oriented evident by her reporting plans to attend PCP appointment with Dr. Douglas Hairston July 10th and prepare to discuss in-home supports for care with family as a whole. Pt agreed to allow her daughters to supervise her at home for the next few days once discharged due to medical concerns. Pt reported \"I would never hurt myself \" and insisted that \"I said what I said earlier during an argument\". Pt and daughter reported no current concerns for returning home and endorsed no current SI. Pt not meeting criteria for hospitalization during this time. ED, Provider in agreement for Pt discharge with resources. No grounds to seek TDO       The patienthas demonstrated mental capacity to provide informed consent. The information is given by the patient. The Chief Complaint is medical concerns . The Precipitant Factors are argument with daughter earlier during the day, living alone, daughter using Pt's car regularly, lack of in-home supports   Previous Hospitalizations: No  The patient has not previously been in restraints. Current Psychiatrist and/or  is None . Lethality Assessment:    The potential for suicide noted by the following: None per Pt report and daughter's reports . The potential for homicide is not noted. The patient has not been a perpetrator of sexual or physical abuse. There are not pending charges.   The patient is not felt to be at risk for self harm or harm to others. The attending nurse was advised that security has not been notified. Section III - Psychosocial  The patient's overall mood and attitude is irritable and uncooperative . Feelings of helplessness and hopelessness are not observed. Generalized anxiety is not observed. Panic is not observed. Phobias are not observed. Obsessive compulsive tendencies are not observed. Section IV - Mental Status Exam  The patient's appearance is tense. The patient's behavior is rigid. The patient is oriented to time, place, person and situation. The patient's speech shows no evidence of impairment. The patient's mood is withdrawn and is irritable. The range of affect is labile. The patient's thought content demonstrates no evidence of impairment. The thought process shows no evidence of impairment. The patient's perception shows no evidence of impairment. The patient's memory shows no evidence of impairment. The patient's appetite shows no evidence of impairment. The patient's sleep shows no evidence of impairment. The patient's insight shows no evidence of impairment. The patient's judgement shows no evidence of impairment. Section V - Substance Abuse  The patient is not using substances. Section VI - Living Arrangements  The patient is . The patient lives alone. The patient has 2 adult children . The patient does plan to return home upon discharge. The patient does not have legal issues pending. The patient's source of income comes from social security. Druze and cultural practices have not been voiced at this time. The patient's greatest support comes from daughters and this person will be involved with the treatment. The patient has not been in an event described as horrible or outside the realm of ordinary life experience either currently or in the past.  The patient has not been a victim of sexual/physical abuse.     Section VII - Other Areas of Clinical Concern  The highest grade achieved is N/A with the overall quality of school experience being described as N/A. The patient is currently unemployed and speaks [place] as a primary language. The patient has no communication impairments affecting communication. The patient's preference for learning can be described as: can read and write adequately. The patient's hearing is uses a hearing aid. The patient's vision is impaired and  wears glasses or contacts.       Brayan Rowe [facility]

## 2022-06-21 NOTE — BSMART NOTE
BSMART assessment completed, and suicide risk level noted to be No RIsk. Primary Nurse Erickson Thao  and Physician Warren Fothergill notified. Concerns not observed. Security/Off- has not been notified.

## 2022-06-22 ENCOUNTER — DOCUMENTATION ONLY (OUTPATIENT)
Dept: CASE MANAGEMENT | Age: 87
End: 2022-06-22

## 2022-06-22 NOTE — PROGRESS NOTES
SSED/CM referral received and appreciated. Noted patient assessed by [facility] and resources provided during Emergency Center visit. Call placed to patient noted initial poor phone connection. CM placed call using another phone. Introduced to role of CM.  states \" I'm embarrassed I had an anger attack w/ my daughters. \" Patient endorses being \"fine\" and will follow up w/ Dr. Cachorro Mari PCP in a few weeks. This writer offered additional resources including home health services. Patient does not express transitional care needs at this time.

## 2022-07-14 ENCOUNTER — OFFICE VISIT (OUTPATIENT)
Dept: INTERNAL MEDICINE CLINIC | Age: 87
End: 2022-07-14
Payer: MEDICARE

## 2022-07-14 VITALS
WEIGHT: 203 LBS | OXYGEN SATURATION: 95 % | RESPIRATION RATE: 16 BRPM | SYSTOLIC BLOOD PRESSURE: 110 MMHG | HEIGHT: 67 IN | HEART RATE: 88 BPM | DIASTOLIC BLOOD PRESSURE: 80 MMHG | TEMPERATURE: 98.4 F | BODY MASS INDEX: 31.86 KG/M2

## 2022-07-14 DIAGNOSIS — M15.9 PRIMARY OSTEOARTHRITIS INVOLVING MULTIPLE JOINTS: ICD-10-CM

## 2022-07-14 DIAGNOSIS — R27.8 GAIT DYSPRAXIA: ICD-10-CM

## 2022-07-14 DIAGNOSIS — F33.1 MAJOR DEPRESSIVE DISORDER, RECURRENT, MODERATE (HCC): Primary | ICD-10-CM

## 2022-07-14 PROCEDURE — G8510 SCR DEP NEG, NO PLAN REQD: HCPCS | Performed by: INTERNAL MEDICINE

## 2022-07-14 PROCEDURE — 1090F PRES/ABSN URINE INCON ASSESS: CPT | Performed by: INTERNAL MEDICINE

## 2022-07-14 PROCEDURE — 99215 OFFICE O/P EST HI 40 MIN: CPT | Performed by: INTERNAL MEDICINE

## 2022-07-14 PROCEDURE — G8417 CALC BMI ABV UP PARAM F/U: HCPCS | Performed by: INTERNAL MEDICINE

## 2022-07-14 PROCEDURE — G8427 DOCREV CUR MEDS BY ELIG CLIN: HCPCS | Performed by: INTERNAL MEDICINE

## 2022-07-14 PROCEDURE — G0463 HOSPITAL OUTPT CLINIC VISIT: HCPCS | Performed by: INTERNAL MEDICINE

## 2022-07-14 PROCEDURE — 1101F PT FALLS ASSESS-DOCD LE1/YR: CPT | Performed by: INTERNAL MEDICINE

## 2022-07-14 PROCEDURE — G8536 NO DOC ELDER MAL SCRN: HCPCS | Performed by: INTERNAL MEDICINE

## 2022-07-14 RX ORDER — FLUOXETINE 20 MG/1
20 TABLET ORAL DAILY
Qty: 30 TABLET | Refills: 1 | Status: SHIPPED | OUTPATIENT
Start: 2022-07-14 | End: 2022-09-12

## 2022-07-14 NOTE — PROGRESS NOTES
Chief Complaint   Patient presents with   [facility] Wellness Visit     Reviewed record in preparation for visit and have obtained necessary documentation. Identified pt with two pt identifiers(name and ).       Health Maintenance Due   Topic    Shingrix Vaccine Age 49> (1 of 2)    Eye Exam Retinal or Dilated     Medicare Yearly Exam          Chief Complaint   Patient presents with    Annual Wellness Visit        Wt Readings from Last 3 Encounters:   22 203 lb (92.1 kg)   22 207 lb 7.3 oz (94.1 kg)   22 203 lb (92.1 kg)     Temp Readings from Last 3 Encounters:   22 98.4 °F (36.9 °C)   22 97.7 °F (36.5 °C)   03/10/22 97.1 °F (36.2 °C) (Temporal)     BP Readings from Last 3 Encounters:   22 110/80   22 123/64   22 122/80     Pulse Readings from Last 3 Encounters:   22 88   22 79   22 92           Learning Assessment:  :     Learning Assessment 2019 3/14/2018 2015 2014   PRIMARY LEARNER Patient Patient Patient Patient   HIGHEST LEVEL OF EDUCATION - PRIMARY LEARNER  2 YEARS OF COLLEGE 2 YEARS OF COLLEGE SOME COLLEGE SOME COLLEGE   BARRIERS PRIMARY LEARNER NONE NONE NONE NONE   CO-LEARNER CAREGIVER No No No No   PRIMARY LANGUAGE ENGLISH ENGLISH ENGLISH ENGLISH    NEED - - No No   LEARNER PREFERENCE PRIMARY READING READING DEMONSTRATION READING     - DEMONSTRATION - -   LEARNING SPECIAL TOPICS - - no no   ANSWERED BY patinet  patient patient patient   RELATIONSHIP SELF SELF SELF SELF       Depression Screening:  :     3 most recent PHQ Screens 2022   Little interest or pleasure in doing things Several days   Feeling down, depressed, irritable, or hopeless Several days   Total Score PHQ 2 2   Trouble falling or staying asleep, or sleeping too much -   Feeling tired or having little energy -   Poor appetite, weight loss, or overeating -   Feeling bad about yourself - or that you are a failure or have let yourself or your family down -   Trouble concentrating on things such as school, work, reading, or watching TV -   Moving or speaking so slowly that other people could have noticed; or the opposite being so fidgety that others notice -   Thoughts of being better off dead, or hurting yourself in some way -   PHQ 9 Score -       Fall Risk Assessment:  :     Fall Risk Assessment, last 12 mths 7/14/2022   Able to walk? Yes   Fall in past 12 months? 0   Do you feel unsteady? 0   Are you worried about falling 0   Number of falls in past 12 months -   Fall with injury? -       Abuse Screening:  :     Abuse Screening Questionnaire 7/14/2022 1/3/2022 5/23/2019 9/11/2018 9/13/2017 6/1/2017 8/12/2015   Do you ever feel afraid of your partner? N N N N N N N   Are you in a relationship with someone who physically or mentally threatens you? N N N N N N N   Is it safe for you to go home? Y Y Y Y Y Y Y       Coordination of Care Questionnaire:  :     1) Have you been to an emergency room, urgent care clinic since your last visit? no   Hospitalized since your last visit? no             2) Have you seen or consulted any other health care providers outside of [address] [place] [address] since your last visit? no  (Include any pap smears or colon screenings in this section.)    3) Do you have an Advance Directive on file? no    4) Are you interested in receiving information on Advance Directives? NO      Patient is accompanied by daughter I have received verbal consent from [address] to discuss any/all medical information while they are present in the room. Reviewed record  In preparation for visit and have obtained necessary documentation.

## 2022-07-15 ENCOUNTER — ANCILLARY PROCEDURE (OUTPATIENT)
Dept: CARDIOLOGY CLINIC | Age: 87
End: 2022-07-15
Payer: MEDICARE

## 2022-07-15 ENCOUNTER — TELEPHONE (OUTPATIENT)
Dept: INTERNAL MEDICINE CLINIC | Age: 87
End: 2022-07-15

## 2022-07-15 VITALS
DIASTOLIC BLOOD PRESSURE: 80 MMHG | BODY MASS INDEX: 31.86 KG/M2 | HEIGHT: 67 IN | WEIGHT: 203 LBS | SYSTOLIC BLOOD PRESSURE: 110 MMHG

## 2022-07-15 DIAGNOSIS — I47.1 PAT (PAROXYSMAL ATRIAL TACHYCARDIA) (HCC): ICD-10-CM

## 2022-07-15 DIAGNOSIS — I47.29 NSVT (NONSUSTAINED VENTRICULAR TACHYCARDIA): ICD-10-CM

## 2022-07-15 PROCEDURE — 93306 TTE W/DOPPLER COMPLETE: CPT | Performed by: INTERNAL MEDICINE

## 2022-07-15 NOTE — TELEPHONE ENCOUNTER
What ever is causing the gait issues needs to be a diagnosis in the visit and a referral must be resubmitted with new diagnos

## 2022-07-15 NOTE — TELEPHONE ENCOUNTER
Reason for call:  Says that dr garland needs to make an addendum to diagnosis for home care    Is this a new problem: yes     Date of last appointment:  7/14/2022     Can we respond via PEAK-IT: no    Best call back number:    yadira with [facility] [phone]

## 2022-07-16 ENCOUNTER — HOME HEALTH ADMISSION (OUTPATIENT)
Dept: HOME HEALTH SERVICES | Facility: HOME HEALTH | Age: 87
End: 2022-07-16

## 2022-07-18 ENCOUNTER — TELEPHONE (OUTPATIENT)
Dept: CARDIOLOGY CLINIC | Age: 87
End: 2022-07-18

## 2022-07-18 LAB
ECHO AO ASC DIAM: 3.4 CM
ECHO AO ASCENDING AORTA INDEX: 1.67 CM/M2
ECHO AO ROOT DIAM: 3.1 CM
ECHO AO ROOT INDEX: 1.53 CM/M2
ECHO AV PEAK GRADIENT: 12 MMHG
ECHO AV PEAK VELOCITY: 1.7 M/S
ECHO AV VELOCITY RATIO: 0.53
ECHO EST RA PRESSURE: 3 MMHG
ECHO LA DIAMETER INDEX: 1.63 CM/M2
ECHO LA DIAMETER: 3.3 CM
ECHO LA TO AORTIC ROOT RATIO: 1.06
ECHO LA VOL 2C: 47 ML (ref 22–52)
ECHO LA VOL 4C: 36 ML (ref 22–52)
ECHO LA VOL BP: 42 ML (ref 22–52)
ECHO LA VOL/BSA BIPLANE: 21 ML/M2 (ref 16–34)
ECHO LA VOLUME AREA LENGTH: 43 ML
ECHO LA VOLUME INDEX A2C: 23 ML/M2 (ref 16–34)
ECHO LA VOLUME INDEX A4C: 18 ML/M2 (ref 16–34)
ECHO LA VOLUME INDEX AREA LENGTH: 21 ML/M2 (ref 16–34)
ECHO LV E' LATERAL VELOCITY: 8 CM/S
ECHO LV E' SEPTAL VELOCITY: 4 CM/S
ECHO LV FRACTIONAL SHORTENING: 42 % (ref 28–44)
ECHO LV INTERNAL DIMENSION DIASTOLE INDEX: 2.22 CM/M2
ECHO LV INTERNAL DIMENSION DIASTOLIC: 4.5 CM (ref 3.9–5.3)
ECHO LV INTERNAL DIMENSION SYSTOLIC INDEX: 1.28 CM/M2
ECHO LV INTERNAL DIMENSION SYSTOLIC: 2.6 CM
ECHO LV ISOVOLUMETRIC RELAXATION TIME (IVRT): 104.7 MS
ECHO LV IVSD: 1.1 CM (ref 0.6–0.9)
ECHO LV MASS 2D: 186.4 G (ref 67–162)
ECHO LV MASS INDEX 2D: 91.8 G/M2 (ref 43–95)
ECHO LV POSTERIOR WALL DIASTOLIC: 1.2 CM (ref 0.6–0.9)
ECHO LV RELATIVE WALL THICKNESS RATIO: 0.53
ECHO LVOT PEAK GRADIENT: 3 MMHG
ECHO LVOT PEAK VELOCITY: 0.9 M/S
ECHO MV "A" WAVE DURATION: 140.8 MSEC
ECHO MV A VELOCITY: 1.08 M/S
ECHO MV E DECELERATION TIME (DT): 234.6 MS
ECHO MV E VELOCITY: 0.63 M/S
ECHO MV E/A RATIO: 0.58
ECHO MV E/E' LATERAL: 7.88
ECHO MV E/E' RATIO (AVERAGED): 11.81
ECHO MV E/E' SEPTAL: 15.75
ECHO RIGHT VENTRICULAR SYSTOLIC PRESSURE (RVSP): 25 MMHG
ECHO RV FREE WALL PEAK S': 12 CM/S
ECHO RV TAPSE: 1.8 CM (ref 1.7–?)
ECHO TV REGURGITANT MAX VELOCITY: 2.34 M/S
ECHO TV REGURGITANT PEAK GRADIENT: 22 MMHG

## 2022-07-18 PROCEDURE — 93306 TTE W/DOPPLER COMPLETE: CPT | Performed by: INTERNAL MEDICINE

## 2022-07-18 NOTE — PROGRESS NOTES
LVEF WNL, mild concentric LVH, grade 1 diastolic dysfunction. Mild sclerosis of aortic valve cusp. Mild TR. No change in treatment plan based on results.
No

## 2022-07-18 NOTE — TELEPHONE ENCOUNTER
Verified patient with two types of identifiers. Notified of results and MD recommendations. Patient verbalized understanding and will call with any other questions.         Future Appointments   Date Time Provider Caren Kirti   8/4/2022  1:40 PM Pranay Shea MD [facility] YANIV BS AMB   10/10/2022 10:30 AM REMOTE1, RHIANNA RODGERS BS AMB   1/16/2023 10:15 AM REMOTE1, RHIANNA RODGERS BS AMB   4/19/2023  9:00 AM REMOTE1, RHIANNA CLIFFORD AMB   7/18/2023  2:00 PM PACEMAKER3, RHIANNA RODGERS BS AMB   7/18/2023  2:20 PM MD ANA MARIA Nava BS AMB

## 2022-07-18 NOTE — TELEPHONE ENCOUNTER
----- Message from John Hua NP sent at 7/18/2022 10:00 AM EDT -----  LVEF WNL, mild concentric LVH, grade 1 diastolic dysfunction. Mild sclerosis of aortic valve cusp. Mild TR. No change in treatment plan based on results.

## 2022-07-19 ENCOUNTER — HOME CARE VISIT (OUTPATIENT)
Dept: HOME HEALTH SERVICES | Facility: HOME HEALTH | Age: 87
End: 2022-07-19

## 2022-08-04 ENCOUNTER — OFFICE VISIT (OUTPATIENT)
Dept: INTERNAL MEDICINE CLINIC | Age: 87
End: 2022-08-04
Payer: MEDICARE

## 2022-08-04 VITALS
OXYGEN SATURATION: 95 % | WEIGHT: 201 LBS | HEART RATE: 84 BPM | BODY MASS INDEX: 31.55 KG/M2 | DIASTOLIC BLOOD PRESSURE: 59 MMHG | SYSTOLIC BLOOD PRESSURE: 123 MMHG | HEIGHT: 67 IN | RESPIRATION RATE: 16 BRPM | TEMPERATURE: 97.5 F

## 2022-08-04 DIAGNOSIS — R27.8 GAIT DYSPRAXIA: ICD-10-CM

## 2022-08-04 DIAGNOSIS — M15.9 PRIMARY OSTEOARTHRITIS INVOLVING MULTIPLE JOINTS: ICD-10-CM

## 2022-08-04 DIAGNOSIS — F33.1 MAJOR DEPRESSIVE DISORDER, RECURRENT, MODERATE (HCC): Primary | ICD-10-CM

## 2022-08-04 PROCEDURE — G0463 HOSPITAL OUTPT CLINIC VISIT: HCPCS | Performed by: INTERNAL MEDICINE

## 2022-08-04 PROCEDURE — G8427 DOCREV CUR MEDS BY ELIG CLIN: HCPCS | Performed by: INTERNAL MEDICINE

## 2022-08-04 PROCEDURE — 99213 OFFICE O/P EST LOW 20 MIN: CPT | Performed by: INTERNAL MEDICINE

## 2022-08-04 PROCEDURE — 1090F PRES/ABSN URINE INCON ASSESS: CPT | Performed by: INTERNAL MEDICINE

## 2022-08-04 PROCEDURE — G8536 NO DOC ELDER MAL SCRN: HCPCS | Performed by: INTERNAL MEDICINE

## 2022-08-04 PROCEDURE — 1101F PT FALLS ASSESS-DOCD LE1/YR: CPT | Performed by: INTERNAL MEDICINE

## 2022-08-04 PROCEDURE — G9717 DOC PT DX DEP/BP F/U NT REQ: HCPCS | Performed by: INTERNAL MEDICINE

## 2022-08-04 PROCEDURE — G8417 CALC BMI ABV UP PARAM F/U: HCPCS | Performed by: INTERNAL MEDICINE

## 2022-08-04 NOTE — PROGRESS NOTES
Verified name and birth date for privacy precautions. Chart reviewed in preparation for today's visit.      Chief Complaint   Patient presents with    Hypertension          Health Maintenance Due   Topic    Shingrix Vaccine Age 49> (1 of 2)    Eye Exam Retinal or Dilated     COVID-19 Vaccine (4 - Booster for Pfizer series)    Medicare Yearly Exam          Wt Readings from Last 3 Encounters:   08/04/22 201 lb (91.2 kg)   07/15/22 203 lb (92.1 kg)   07/14/22 203 lb (92.1 kg)     Temp Readings from Last 3 Encounters:   08/04/22 97.5 °F (36.4 °C) (Temporal)   07/14/22 98.4 °F (36.9 °C)   06/20/22 97.7 °F (36.5 °C)     BP Readings from Last 3 Encounters:   08/04/22 (!) 123/59   07/15/22 110/80   07/14/22 110/80     Pulse Readings from Last 3 Encounters:   08/04/22 84   07/14/22 88   06/20/22 79         Learning Assessment:  :     Learning Assessment 5/23/2019 3/14/2018 8/12/2015 4/2/2014   PRIMARY LEARNER Patient Patient Patient Patient   HIGHEST LEVEL OF EDUCATION - PRIMARY LEARNER  2 YEARS OF COLLEGE 2 YEARS OF COLLEGE SOME COLLEGE SOME COLLEGE   BARRIERS PRIMARY LEARNER NONE NONE NONE NONE   CO-LEARNER CAREGIVER No No No No   PRIMARY LANGUAGE ENGLISH ENGLISH ENGLISH ENGLISH    NEED - - No No   LEARNER PREFERENCE PRIMARY READING READING DEMONSTRATION READING     - DEMONSTRATION - -   LEARNING SPECIAL TOPICS - - no no   ANSWERED BY patinet  patient patient patient   RELATIONSHIP SELF SELF SELF SELF       Depression Screening:  :     3 most recent PHQ Screens 8/4/2022   Little interest or pleasure in doing things Not at all   Feeling down, depressed, irritable, or hopeless Not at all   Total Score PHQ 2 0   Trouble falling or staying asleep, or sleeping too much -   Feeling tired or having little energy -   Poor appetite, weight loss, or overeating -   Feeling bad about yourself - or that you are a failure or have let yourself or your family down -   Trouble concentrating on things such as school, work, reading, or watching TV -   Moving or speaking so slowly that other people could have noticed; or the opposite being so fidgety that others notice -   Thoughts of being better off dead, or hurting yourself in some way -   PHQ 9 Score -       Fall Risk Assessment:  :     Fall Risk Assessment, last 12 mths 7/14/2022   Able to walk? Yes   Fall in past 12 months? 0   Do you feel unsteady? 0   Are you worried about falling 0   Number of falls in past 12 months -   Fall with injury? -       Abuse Screening:  :     Abuse Screening Questionnaire 8/4/2022 7/14/2022 1/3/2022 5/23/2019 9/11/2018 9/13/2017 6/1/2017   Do you ever feel afraid of your partner? N N N N N N N   Are you in a relationship with someone who physically or mentally threatens you? N N N N N N N   Is it safe for you to go home?  [place] Reeves

## 2022-08-04 NOTE — PROGRESS NOTES
Assessment/Plan:     1. Major depressive disorder, recurrent, moderate (HCC)  -recommended that she take her prozac in the evening. If it is causing some sleepiness, may help her sleep in the evening.   -follow up in 2 months. Patient would like to do this a a virtual visit as it is difficult to find rides    2. Gait dyspraxia  -in process of getting a rollator walker to help with her stability.   -son will contact me with the additional forms needed. 3. Primary osteoarthritis involving multiple joints             Follow-up Disposition:     follow up 2 months, virtual        Disclaimer:  Return if symptoms worsen or fail to improve. Advised patient to call back or return to office if symptoms worsen/change/persist.     Discussed expected course/resolution/complications of diagnosis in detail with patient. Medication risks/benefits/costs/interactions/alternatives discussed with patient. Patient was given an after visit summary which includes diagnoses, current medications, & vitals. Patient expressed understanding with the diagnosis and plan. Subjective:      Ric Medeiros is a 80 y.o. female who presents today for follow up of her depression and etoh use      -started use of prozac 20mg daily on 7/14/2022. She is taking the medication in the morning. It is causing some fatigue.    -she has also stopped alcohol use.      -she is in process of getting a rollator walker. Her son is working on this. May need forms for me to complete. Prescription already given to patient.       Objective:       Visit Vitals  BP (!) 123/59 (BP 1 Location: Left upper arm, BP Patient Position: Sitting, BP Cuff Size: Large adult)   Pulse 84   Temp 97.5 °F (36.4 °C) (Temporal)   Resp 16   Ht 5' 7\" (1.702 m)   Wt 201 lb (91.2 kg)   LMP  (LMP Unknown)   SpO2 95%   BMI 31.48 kg/m²     General appearance: alert, cooperative, no distress, appears stated age  Neurologic: Mental status: Alert, oriented, thought content appropriate, affect: stable and normal

## 2022-10-06 ENCOUNTER — VIRTUAL VISIT (OUTPATIENT)
Dept: INTERNAL MEDICINE CLINIC | Age: 87
End: 2022-10-06
Payer: MEDICARE

## 2022-10-06 DIAGNOSIS — R61 NIGHT SWEAT: ICD-10-CM

## 2022-10-06 DIAGNOSIS — F32.A DEPRESSION, UNSPECIFIED DEPRESSION TYPE: Primary | ICD-10-CM

## 2022-10-06 PROCEDURE — G8427 DOCREV CUR MEDS BY ELIG CLIN: HCPCS | Performed by: INTERNAL MEDICINE

## 2022-10-06 PROCEDURE — G8536 NO DOC ELDER MAL SCRN: HCPCS | Performed by: INTERNAL MEDICINE

## 2022-10-06 PROCEDURE — 99213 OFFICE O/P EST LOW 20 MIN: CPT | Performed by: INTERNAL MEDICINE

## 2022-10-06 PROCEDURE — G9717 DOC PT DX DEP/BP F/U NT REQ: HCPCS | Performed by: INTERNAL MEDICINE

## 2022-10-06 PROCEDURE — G8417 CALC BMI ABV UP PARAM F/U: HCPCS | Performed by: INTERNAL MEDICINE

## 2022-10-06 PROCEDURE — 1090F PRES/ABSN URINE INCON ASSESS: CPT | Performed by: INTERNAL MEDICINE

## 2022-10-06 PROCEDURE — 1101F PT FALLS ASSESS-DOCD LE1/YR: CPT | Performed by: INTERNAL MEDICINE

## 2022-10-06 PROCEDURE — G0463 HOSPITAL OUTPT CLINIC VISIT: HCPCS | Performed by: INTERNAL MEDICINE

## 2022-10-06 NOTE — PROGRESS NOTES
Dulce Maria Mueller is a 80 y.o. female who was seen by synchronous (real-time) audio-video technology on 10/6/2022. She confirmed that, for purposes of billing, this is a virtual visit with her provider for which we will submit a claim for reimbursement to her insurance company. She is aware that she will be responsible for any copays, coinsurance amounts or other amounts not covered by her insurance company. Do you accept - YES    This visit was completed was completed virtually using DTVCast      Subjective:   Dulce Maria Mueller was seen for follow up of her depression. -patient was started on prozac 20mg daily in July, 2022. Upon follow up on 8/4/2022, she noted that the medication may be contributing to increased fatigue. I recommended she take this medication at bedtime instead of morning when she was taking it.     -she reports today, that she did not switch use of her medication to the evening, but the fatigue has resolved.    -she is noting that she has night sweats. This has been ongoing for the past year. She is still drinking alcohol every night. She denies use of alcohol during the day. Night sweat only occurs at 4am in the morning when she gets up to use the restroom. Prior to Admission medications    Medication Sig Start Date End Date Taking? Authorizing Provider   FLUoxetine (PROzac) 20 mg capsule TAKE ONE CAPSULE BY MOUTH ONE TIME DAILY 9/12/22  Yes Ash Tong MD   Wheel Chair fide R27.8 7/14/22  Yes Ash Tong MD   amLODIPine (NORVASC) 2.5 mg tablet TAKE ONE TABLET BY MOUTH ONE TIME DAILY 5/15/22  Yes Ash Tong MD   benazepriL (LOTENSIN) 10 mg tablet TAKE ONE TABLET BY MOUTH ONE TIME DAILY 5/15/22  Yes Ash Tong MD   IBUPROFEN (ADVIL PO) Take 1 Tablet by mouth nightly. Yes Provider, Historical   cetirizine (ZYRTEC) 5 mg tablet Take 10 mg by mouth daily as needed. Yes Provider, Historical   Walker (Ultra-Light Rollator) misc Use daily as directed.  R27.8 7/14/22 10/6/22 Flo Hutchins MD   glucose blood VI test strips (blood glucose test) strip One touch ultra. Use to test blood sugars once daily. DX:E11.21  Patient not taking: No sig reported 4/26/21   Flo Hutchins MD   glucose blood VI test strips (ASCENSIA AUTODISC VI, ONE TOUCH ULTRA TEST VI) strip Use to monitor blood sugar twice daily or as directed. Patient not taking: No sig reported 4/23/21   Flo Hutchins MD   Blood-Glucose Meter monitoring kit One touch ultra. Use to test blood sugars once daily. DX:E11.21  Patient not taking: No sig reported 5/15/20   Flo Hutchins MD   lancets misc One touch ultra. Use to test blood sugars once daily. DX:E11.21  Patient not taking: No sig reported 5/15/20   Flo Hutchins MD   Blood-Glucose Meter monitoring kit Use daily as directed  Patient not taking: Reported on 10/6/2022 5/5/20   Flo Hutchins MD   Blood-Glucose Meter monitoring kit Use as directed  Patient not taking: No sig reported 5/5/20   Flo Hutchins MD       Allergies   Allergen Reactions    Ciprofloxacin Other (comments)     \"jittery\"    Lipitor [Atorvastatin] Myalgia    Mevacor [Lovastatin] Other (comments)     dyspepsia    Pcn [Penicillins] Hives    Zocor [Simvastatin] Myalgia         ROS - per HPI      Objective:     General: alert, cooperative, no distress   Mental  status: pe mental status_general use: normal mood, behavior, speech, dress, motor activity, and thought processes, able to follow commands   Eyes: EOM intact, normal sclera   Mouth: not examined   Neck: no visualized mass   Resp: PULM - obs findings: normal effort and no respiratory distress   Neuro: neuro - obs: no gross deficits   Musculoskeletal: normal ROM of neck   Skin: skin exam: no discoloration or lesions of concern on visible areas   Psychiatric: normal affect, no hallucinations       Assessment & Plan:   1. Depression, unspecified depression type  2. Night sweat    Plan:  -will continue prozac 20mg daily for depression.    -encouraged her to stop alcohol use at this time. Likely may be contributing to her night sweats. Will get labs at next visit. -recommended she follow up in 3 months. CPT Codes 20392-63675 for Established Patients may apply to this Telehealth Visit  Time-based coding, delete if not needed: I spent at least 15 minutes with this established patient, and >50% of the time was spent counseling and/or coordinating care regarding her depression    Due to this being a TeleHealth evaluation, many elements of the physical examination are unable to be assessed. Pursuant to the emergency declaration under the [address] [place] [address], [facility] waiver authority and the [employer] Act, this Virtual  Visit was conducted, with patient's consent, to reduce the patient's risk of exposure to COVID-19 and provide continuity of care for an established patient. Services were provided through a video synchronous discussion virtually to substitute for in-person clinic visit. We discussed the expected course, resolution and complications of the diagnosis(es) in detail. Medication risks, benefits, costs, interactions, and alternatives were discussed as indicated. I advised her to contact the office if her condition worsens, changes or fails to improve as anticipated. She expressed understanding with the diagnosis(es) and plan.      Mimi Fisher MD

## 2022-12-27 ENCOUNTER — TELEPHONE (OUTPATIENT)
Dept: INTERNAL MEDICINE CLINIC | Age: 87
End: 2022-12-27

## 2022-12-27 NOTE — TELEPHONE ENCOUNTER
Needs to get an order at her appt. As a face to face what she's tried and failed and that Pt. In agreement, can be done as ambulatory supply and daughter can check pricing or if insurance has a contract with any medical supply.

## 2022-12-27 NOTE — TELEPHONE ENCOUNTER
----- Message from Meghann Jones sent at 12/27/2022  3:47 PM EST -----  Subject: Message to Provider    QUESTIONS  Information for Provider? Pt's daughter called wanting to make an appt   this week with Dr. Nereida Pascual. She states that she has concerns since her mom took   a fall a while back. She was medically cleared but since then her mom has   had real bad shakes and a loss of appetite. An appt was booked on 12/30   with Lucita Gagnon, but would rather see Dr. Nereida Pascual instead if at all possible. Please   return call to Mara Pacheco.   ---------------------------------------------------------------------------  --------------  [address] [employer]  [phone]; OK to leave message on voicemail  ---------------------------------------------------------------------------  --------------  SCRIPT ANSWERS  Relationship to Patient?  Self

## 2022-12-27 NOTE — TELEPHONE ENCOUNTER
----- Message from Praveen Dangelo sent at 12/27/2022  3:16 PM EST -----  Subject: Message to Provider    QUESTIONS  Information for Provider? Jacqueline need a call back regarding her Will Neiltravis with   Felice Do. . If need a prescription note that be cover thru her   insurance. Enrique Body can be reached @ [phone]  ---------------------------------------------------------------------------  --------------  Starr Marques INFO  [phone]; OK to leave message on voicemail  ---------------------------------------------------------------------------  --------------  SCRIPT ANSWERS  Relationship to Patient? Third Party  Third Party Type? Other  Other Third Party Type? daughter  Representative Name?  Enrique Body

## 2022-12-29 ENCOUNTER — OFFICE VISIT (OUTPATIENT)
Dept: INTERNAL MEDICINE CLINIC | Age: 87
End: 2022-12-29
Payer: MEDICARE

## 2022-12-29 VITALS
RESPIRATION RATE: 16 BRPM | BODY MASS INDEX: 30.76 KG/M2 | OXYGEN SATURATION: 96 % | TEMPERATURE: 97.5 F | WEIGHT: 196 LBS | HEART RATE: 92 BPM | SYSTOLIC BLOOD PRESSURE: 121 MMHG | DIASTOLIC BLOOD PRESSURE: 77 MMHG | HEIGHT: 67 IN

## 2022-12-29 DIAGNOSIS — I10 ESSENTIAL HYPERTENSION, BENIGN: ICD-10-CM

## 2022-12-29 DIAGNOSIS — F32.A DEPRESSION, UNSPECIFIED DEPRESSION TYPE: ICD-10-CM

## 2022-12-29 DIAGNOSIS — F10.20 UNCOMPLICATED ALCOHOL DEPENDENCE (HCC): ICD-10-CM

## 2022-12-29 DIAGNOSIS — E78.2 MIXED HYPERLIPIDEMIA: ICD-10-CM

## 2022-12-29 DIAGNOSIS — E11.59 TYPE 2 DIABETES MELLITUS WITH OTHER CIRCULATORY COMPLICATION, WITHOUT LONG-TERM CURRENT USE OF INSULIN (HCC): ICD-10-CM

## 2022-12-29 DIAGNOSIS — Z79.899 ENCOUNTER FOR LONG-TERM (CURRENT) USE OF MEDICATIONS: ICD-10-CM

## 2022-12-29 DIAGNOSIS — Z00.00 MEDICARE ANNUAL WELLNESS VISIT, SUBSEQUENT: Primary | ICD-10-CM

## 2022-12-29 DIAGNOSIS — R26.89 IMBALANCE: ICD-10-CM

## 2022-12-29 DIAGNOSIS — Z13.31 SCREENING FOR DEPRESSION: ICD-10-CM

## 2022-12-29 DIAGNOSIS — I44.2 ATRIOVENTRICULAR BLOCK, COMPLETE (HCC): ICD-10-CM

## 2022-12-29 DIAGNOSIS — R25.1 TREMOR OF BOTH OUTSTRETCHED HANDS: ICD-10-CM

## 2022-12-29 LAB
ALBUMIN SERPL-MCNC: 3.9 G/DL (ref 3.5–5)
ALBUMIN/GLOB SERPL: 1.3 {RATIO} (ref 1.1–2.2)
ALP SERPL-CCNC: 78 U/L (ref 45–117)
ALT SERPL-CCNC: 24 U/L (ref 12–78)
ANION GAP SERPL CALC-SCNC: 6 MMOL/L (ref 5–15)
AST SERPL-CCNC: 18 U/L (ref 15–37)
BASOPHILS # BLD: 0.1 K/UL (ref 0–0.1)
BASOPHILS NFR BLD: 1 % (ref 0–1)
BILIRUB SERPL-MCNC: 0.6 MG/DL (ref 0.2–1)
BUN SERPL-MCNC: 28 MG/DL (ref 6–20)
BUN/CREAT SERPL: 22 (ref 12–20)
CALCIUM SERPL-MCNC: 9.4 MG/DL (ref 8.5–10.1)
CHLORIDE SERPL-SCNC: 107 MMOL/L (ref 97–108)
CO2 SERPL-SCNC: 26 MMOL/L (ref 21–32)
CREAT SERPL-MCNC: 1.3 MG/DL (ref 0.55–1.02)
CREAT UR-MCNC: 287 MG/DL
DIFFERENTIAL METHOD BLD: ABNORMAL
EOSINOPHIL # BLD: 0.2 K/UL (ref 0–0.4)
EOSINOPHIL NFR BLD: 2 % (ref 0–7)
ERYTHROCYTE [DISTWIDTH] IN BLOOD BY AUTOMATED COUNT: 13.1 % (ref 11.5–14.5)
EST. AVERAGE GLUCOSE BLD GHB EST-MCNC: 120 MG/DL
ETHANOL SERPL-MCNC: <10 MG/DL
GLOBULIN SER CALC-MCNC: 2.9 G/DL (ref 2–4)
GLUCOSE SERPL-MCNC: 144 MG/DL (ref 65–100)
HBA1C MFR BLD: 5.8 % (ref 4–5.6)
HCT VFR BLD AUTO: 44.6 % (ref 35–47)
HGB BLD-MCNC: 14.2 G/DL (ref 11.5–16)
IMM GRANULOCYTES # BLD AUTO: 0 K/UL (ref 0–0.04)
IMM GRANULOCYTES NFR BLD AUTO: 1 % (ref 0–0.5)
LYMPHOCYTES # BLD: 1.2 K/UL (ref 0.8–3.5)
LYMPHOCYTES NFR BLD: 14 % (ref 12–49)
MCH RBC QN AUTO: 30.3 PG (ref 26–34)
MCHC RBC AUTO-ENTMCNC: 31.8 G/DL (ref 30–36.5)
MCV RBC AUTO: 95.3 FL (ref 80–99)
MICROALBUMIN UR-MCNC: 2.19 MG/DL
MICROALBUMIN/CREAT UR-RTO: 8 MG/G (ref 0–30)
MONOCYTES # BLD: 1 K/UL (ref 0–1)
MONOCYTES NFR BLD: 12 % (ref 5–13)
NEUTS SEG # BLD: 5.9 K/UL (ref 1.8–8)
NEUTS SEG NFR BLD: 70 % (ref 32–75)
NRBC # BLD: 0 K/UL (ref 0–0.01)
NRBC BLD-RTO: 0 PER 100 WBC
PLATELET # BLD AUTO: 237 K/UL (ref 150–400)
PMV BLD AUTO: 11.8 FL (ref 8.9–12.9)
POTASSIUM SERPL-SCNC: 4.2 MMOL/L (ref 3.5–5.1)
PROT SERPL-MCNC: 6.8 G/DL (ref 6.4–8.2)
RBC # BLD AUTO: 4.68 M/UL (ref 3.8–5.2)
SODIUM SERPL-SCNC: 139 MMOL/L (ref 136–145)
TSH SERPL DL<=0.05 MIU/L-ACNC: 3.19 UIU/ML (ref 0.36–3.74)
WBC # BLD AUTO: 8.4 K/UL (ref 3.6–11)

## 2022-12-29 PROCEDURE — 1101F PT FALLS ASSESS-DOCD LE1/YR: CPT | Performed by: INTERNAL MEDICINE

## 2022-12-29 PROCEDURE — G0463 HOSPITAL OUTPT CLINIC VISIT: HCPCS | Performed by: INTERNAL MEDICINE

## 2022-12-29 PROCEDURE — 99213 OFFICE O/P EST LOW 20 MIN: CPT | Performed by: INTERNAL MEDICINE

## 2022-12-29 PROCEDURE — G8536 NO DOC ELDER MAL SCRN: HCPCS | Performed by: INTERNAL MEDICINE

## 2022-12-29 PROCEDURE — G9717 DOC PT DX DEP/BP F/U NT REQ: HCPCS | Performed by: INTERNAL MEDICINE

## 2022-12-29 PROCEDURE — G0439 PPPS, SUBSEQ VISIT: HCPCS | Performed by: INTERNAL MEDICINE

## 2022-12-29 PROCEDURE — G8427 DOCREV CUR MEDS BY ELIG CLIN: HCPCS | Performed by: INTERNAL MEDICINE

## 2022-12-29 PROCEDURE — G8417 CALC BMI ABV UP PARAM F/U: HCPCS | Performed by: INTERNAL MEDICINE

## 2022-12-29 PROCEDURE — 1090F PRES/ABSN URINE INCON ASSESS: CPT | Performed by: INTERNAL MEDICINE

## 2022-12-29 NOTE — PROGRESS NOTES
Verified name and birth date for privacy precautions. Chart reviewed in preparation for today's visit.      Chief Complaint   Patient presents with    Fall     12/6/22          Health Maintenance Due   Topic    Shingles Vaccine (1 of 2)    COVID-19 Vaccine (4 - Booster for [employer] series)    Medicare Yearly Exam     Flu Vaccine (1)         Wt Readings from Last 3 Encounters:   12/29/22 196 lb (88.9 kg)   08/04/22 201 lb (91.2 kg)   07/15/22 203 lb (92.1 kg)     Temp Readings from Last 3 Encounters:   12/29/22 97.5 °F (36.4 °C) (Temporal)   08/04/22 97.5 °F (36.4 °C) (Temporal)   07/14/22 98.4 °F (36.9 °C)     BP Readings from Last 3 Encounters:   12/29/22 121/77   08/04/22 (!) 123/59   07/15/22 110/80     Pulse Readings from Last 3 Encounters:   12/29/22 92   08/04/22 84   07/14/22 88         Learning Assessment:  :     Learning Assessment 5/23/2019 3/14/2018 8/12/2015 4/2/2014   PRIMARY LEARNER Patient Patient Patient Patient   HIGHEST LEVEL OF EDUCATION - PRIMARY LEARNER  2 YEARS OF COLLEGE 2 YEARS OF COLLEGE SOME COLLEGE SOME COLLEGE   BARRIERS PRIMARY LEARNER NONE NONE NONE NONE   CO-LEARNER CAREGIVER No No No No   PRIMARY LANGUAGE ENGLISH ENGLISH ENGLISH ENGLISH    NEED - - No No   LEARNER PREFERENCE PRIMARY READING READING DEMONSTRATION READING     - DEMONSTRATION - -   LEARNING SPECIAL TOPICS - - no no   ANSWERED BY patinet  patient patient patient   RELATIONSHIP SELF SELF SELF SELF       Depression Screening:  :     3 most recent PHQ Screens 12/29/2022   Little interest or pleasure in doing things Not at all   Feeling down, depressed, irritable, or hopeless Not at all   Total Score PHQ 2 0   Trouble falling or staying asleep, or sleeping too much -   Feeling tired or having little energy -   Poor appetite, weight loss, or overeating -   Feeling bad about yourself - or that you are a failure or have let yourself or your family down -   Trouble concentrating on things such as school, work, reading, or watching TV -   Moving or speaking so slowly that other people could have noticed; or the opposite being so fidgety that others notice -   Thoughts of being better off dead, or hurting yourself in some way -   PHQ 9 Score -       Fall Risk Assessment:  :     Fall Risk Assessment, last 12 mths 12/29/2022   Able to walk? Yes   Fall in past 12 months? 1   Do you feel unsteady? 1   Are you worried about falling 0   Number of falls in past 12 months 1   Fall with injury? 1       Abuse Screening:  :     Abuse Screening Questionnaire 12/29/2022 8/4/2022 7/14/2022 1/3/2022 5/23/2019 9/11/2018 9/13/2017   Do you ever feel afraid of your partner? N N N N N N N   Are you in a relationship with someone who physically or mentally threatens you? N N N N N N N   Is it safe for you to go home?  Kelly Christensen

## 2022-12-29 NOTE — PATIENT INSTRUCTIONS
Medicare Wellness Visit, Female     The best way to live healthy is to have a lifestyle where you eat a well-balanced diet, exercise regularly, limit alcohol use, and quit all forms of tobacco/nicotine, if applicable. Regular preventive services are another way to keep healthy. Preventive services (vaccines, screening tests, monitoring & exams) can help personalize your care plan, which helps you manage your own care. Screening tests can find health problems at the earliest stages, when they are easiest to treat. [facility] follows the current, evidence-based guidelines published by the [facility] Lio Swain ([facility]) when recommending preventive services for our patients. Because we follow these guidelines, sometimes recommendations change over time as research supports it. (For example, mammograms used to be recommended annually. Even though Medicare will still pay for an annual mammogram, the newer guidelines recommend a mammogram every two years for women of average risk). Of course, you and your doctor may decide to screen more often for some diseases, based on your risk and your co-morbidities (chronic disease you are already diagnosed with). Preventive services for you include:  - Medicare offers their members a free annual wellness visit, which is time for you and your primary care provider to discuss and plan for your preventive service needs.  Take advantage of this benefit every year!    -Over the age of 72 should receive the recommended pneumonia vaccines.    -All adults should have a flu vaccine yearly.  -All adults should have a tetanus vaccine every 10 years.   -Over the age 48 should receive the shingles vaccines.        -All adults should be screened once for Hepatitis C.  -All adults age 38-68 who are overweight should have a diabetes screening test once every three years.   -Other screening tests and preventive services for persons with diabetes include: an eye exam to screen for diabetic retinopathy, a kidney function test, a foot exam, and stricter control over your cholesterol.   -Cardiovascular screening for adults with routine risk involves an electrocardiogram (ECG) at intervals determined by your doctor.     -Colorectal cancer screenings should be done for adults age 39-70 with no increased risk factors for colorectal cancer. There are a number of acceptable methods of screening for this type of cancer. Each test has its own benefits and drawbacks. Discuss with your doctor what is most appropriate for you during your annual wellness visit. The different tests include: colonoscopy (considered the best screening method), a fecal occult blood test, a fecal DNA test, and sigmoidoscopy.    -Lung cancer screening is recommended annually with a low dose CT scan for adults between age 54 and 68, who have smoked at least 30 pack years (equivalent of 1 pack per day for 30 days), and who is a current smoker or quit less than 15 years ago.    -A bone mass density test is recommended when a woman turns 65 to screen for osteoporosis. This test is only recommended one time, as a screening. Some providers will use this same test as a disease monitoring tool if you already have osteoporosis. -Breast cancer screenings are recommended every other year for women of normal risk, age 54-69.    -Cervical cancer screenings for women over age 72 are only recommended with certain risk factors.

## 2022-12-29 NOTE — PROGRESS NOTES
Assessment/Plan:     1. Type 2 diabetes mellitus with other circulatory complication, without long-term current use of insulin ([facility]  -encouraged maintaining diabetes mellitus diet. - CBC WITH AUTOMATED DIFF; Future  - METABOLIC PANEL, COMPREHENSIVE; Future  - HEMOGLOBIN A1C WITH EAG; Future  - MICROALBUMIN, UR, RAND W/ MICROALB/CREAT RATIO; Future  - MICROALBUMIN, UR, RAND W/ MICROALB/CREAT RATIO  - HEMOGLOBIN A1C WITH EAG  - METABOLIC PANEL, COMPREHENSIVE  - CBC WITH AUTOMATED DIFF    2. Hypertension, benign  -controlled with use of benazepril 10mg daily, amlodipine 2.5mg daily     3. Mixed hyperlipidemia  -diet controlled. 4. Atrioventricular block, complete ([facility] [address]  -pacemaker followed by cardiology    - TSH 3RD GENERATION; Future  - TSH 3RD GENERATION    5. Depression, unspecified depression type  -stable with use of prozac 20mg daily.     - TSH 3RD GENERATION; Future  - TSH 3RD GENERATION    6. Encounter for long-term (current) use of medications      7. Medicare annual wellness visit, subsequent  -completed [facility] [place] decision maker reviewed with patient and updated. - DEPRESSION SCREEN ANNUAL    8. Screening for depression    - DEPRESSION SCREEN ANNUAL    9. Tremor of both outstretched hands  -also tremor of her head at rest  -requesting neurology evaluation.     - REFERRAL TO NEUROLOGY    10. Imbalance  -sedentary  -encouraged regular exercise for gait imbalance  -recommended PT    - REFERRAL TO NEUROLOGY    11. Uncomplicated alcohol dependence (HCC)  -still using alcohol daily. Reports just 1 glass of wine nightly.     - ETHYL ALCOHOL;  Future  - ETHYL ALCOHOL       Orders Placed This Encounter    ANNUAL DEPRESSION SCREEN 8-15 MIN    CBC WITH AUTOMATED DIFF     Standing Status:   Future     Number of Occurrences:   1     Standing Expiration Date:   11/32/2498    METABOLIC PANEL, COMPREHENSIVE     Standing Status:   Future     Number of Occurrences:   1     Standing Expiration Date: 12/29/2023    HEMOGLOBIN A1C WITH EAG     Standing Status:   Future     Number of Occurrences:   1     Standing Expiration Date:   12/29/2023    MICROALBUMIN, UR, RAND W/ MICROALB/CREAT RATIO     Standing Status:   Future     Number of Occurrences:   1     Standing Expiration Date:   12/29/2023    ETHYL ALCOHOL     Standing Status:   Future     Number of Occurrences:   1     Standing Expiration Date:   12/29/2023    TSH 3RD GENERATION     Standing Status:   Future     Number of Occurrences:   1     Standing Expiration Date:   12/29/2023    REFERRAL TO NEUROLOGY     Referral Priority:   Routine     Referral Type:   Consultation     Referral Reason:   Specialty Services Required     Referred to Provider:   Yo Leach MD     Number of Visits Requested:   1          Follow-up Disposition:     follow up 4 months. Subjective:      Nida Oconnell is a 80 y.o. female who presents today for her Medicare Wellness Exam and follow up of her diabetes mellitus type 2, hypertension, hyperlipidemia, history of complete AV block with pacemaker, depression and etoh dependence.     -here today with her Daughter, Yumiko. Since last visit :  -on December 6, 2022 - around 11pm at night , getting ready to go to bed. Was locking front door. She fell while moving a small piece of furniture with her foot. Not using her walker when she fell. She couldn't get up on her own. She slept on the floor until morning because she didn't want to disturb anyone. She was able to scootch to phone in the morning and called daughter. EMS was called to help her up. She refused ER. Ribs sore for a few days. She reports having had 1 drink earlier that night. No lightheadedness or chest pains. She tripped. -doesn't check glucose at home.     -has intermittent episodes of confusion her daughter has noted.     -tremors in hands and she notes some with her head. Notes that it is worse with being hungry.  Tremors only with movement.     -gait imbalance -     -appetite decreased -       Patient Care Team:  -Dr. Ольга Campos - pacemaker, heart block         Objective: Wt Readings from Last 3 Encounters:   12/29/22 196 lb (88.9 kg)   08/04/22 201 lb (91.2 kg)   07/15/22 203 lb (92.1 kg)     BP Readings from Last 3 Encounters:   08/04/22 (!) 123/59   07/15/22 110/80   07/14/22 110/80     Visit Vitals  /77   Pulse 92   Temp 97.5 °F (36.4 °C) (Temporal)   Resp 16   Ht 5' 7\" (1.702 m)   Wt 196 lb (88.9 kg)   LMP  (LMP Unknown)   SpO2 96%   BMI 30.70 kg/m²     General appearance: alert, cooperative, no distress, appears stated age  Head: Normocephalic, without obvious abnormality, atraumatic  Neck: supple, symmetrical, trachea midline, no adenopathy, thyroid: not enlarged, symmetric, no tenderness/mass/nodules, no carotid bruit, and no JVD  Lungs: clear to auscultation bilaterally  Heart: regular rate and rhythm, systolic murmur: systolic ejection 1/6,  at 2nd left intercostal space, at 2nd right intercostal space              Disclaimer:  Return if symptoms worsen or fail to improve. Advised patient to call back or return to office if symptoms worsen/change/persist.     Discussed expected course/resolution/complications of diagnosis in detail with patient. Medication risks/benefits/costs/interactions/alternatives discussed with patient. Patient was given an after visit summary which includes diagnoses, current medications, & vitals. Patient expressed understanding with the diagnosis and plan. This is the Subsequent Medicare Annual Wellness Exam, performed 12 months or more after the Initial AWV or the last Subsequent AWV    I have reviewed the patient's medical history in detail and updated the computerized patient record. Assessment/Plan   Education and counseling provided:  Are appropriate based on today's review and evaluation    1. Medicare annual wellness visit, subsequent  -     Blane Chavira  2.  Type 2 diabetes mellitus with other circulatory complication, without long-term current use of insulin ([facility] [address]  3. Hypertension, benign  4. Mixed hyperlipidemia  5. Atrioventricular block, complete ([address]  6. Depression, unspecified depression type  7. Encounter for long-term (current) use of medications  8. Screening for depression  -     DEPRESSION SCREEN ANNUAL       Depression Risk Factor Screening     3 most recent PHQ Screens 12/29/2022   Little interest or pleasure in doing things Not at all   Feeling down, depressed, irritable, or hopeless Not at all   Total Score PHQ 2 0   Trouble falling or staying asleep, or sleeping too much -   Feeling tired or having little energy -   Poor appetite, weight loss, or overeating -   Feeling bad about yourself - or that you are a failure or have let yourself or your family down -   Trouble concentrating on things such as school, work, reading, or watching TV -   Moving or speaking so slowly that other people could have noticed; or the opposite being so fidgety that others notice -   Thoughts of being better off dead, or hurting yourself in some way -   PHQ 9 Score -       Alcohol & Drug Abuse Risk Screen    Do you average more than 1 drink per night or more than 7 drinks a week:  Yes    On any one occasion in the past three months have you have had more than 3 drinks containing alcohol:  No          Functional Ability and Level of Safety    Hearing: Hearing is good. Activities of Daily Living: The home contains: grab bars  Patient needs help with:  transportation and shopping      Ambulation: with difficulty, uses a cane and walker     Fall Risk:  Fall Risk Assessment, last 12 mths 12/29/2022   Able to walk? Yes   Fall in past 12 months? 1   Do you feel unsteady? 1   Are you worried about falling 0   Number of falls in past 12 months 1   Fall with injury?  1      Abuse Screen:  Patient is not abused       Cognitive Screening    Has your family/caregiver stated any concerns about your memory: yes - having intermittent episodes of confusion per daughter         Health Maintenance Due     Health Maintenance Due   Topic Date Due    Shingles Vaccine (1 of 2) Never done    COVID-19 Vaccine (4 - Booster for Pfizer series) 12/09/2021    Flu Vaccine (1) 08/01/2022       Patient Care Team   Patient Care Team:  Sascha Ballesteros MD as PCP - General (Internal Medicine Physician)  Sascha Ballesteros MD as PCP - [facility] (Cardiovascular Disease Physician)  Ana Laura Shane MD (Ophthalmology)  Cee Guadarrama MD (Cardiovascular Disease Physician)    History     Patient Active Problem List   Diagnosis Code    Atrioventricular block, 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     hyperlipidemia E78.5    Hypertension, benign I10    Cardiac pacemaker in situ Z95.0    S/P FERNANDO-BSO Z90.710, Z90.722, Z90.79    Allergy to environmental factors Z91.09    Rotator cuff tear, right M75.101    S/P right cataract extraction Z98.41    History of screening mammography Z92.89    Colon cancer screening Z12.11    Diabetes type 2, controlled ([address] E11.9    Advance directive discussed with patient Z71.89    Diabetic eye exam ([address] Z01.00, E11.9    PAT (paroxysmal atrial tachycardia) ([address] I47.1    Type 2 diabetes with nephropathy ([address] E11.21    Major depressive disorder, recurrent, moderate F33.1     Past Medical History:   Diagnosis Date     hyperlipidemia 4/4/2011    Allergy to environmental factors 3/2/2012    Atrioventricular block, complete ([address] 4/4/2011    Cardiac pacemaker in situ 4/4/2011    Diabetes ([address]     Hypercholesterolemia     Hypertension     Hypertension, benign 4/4/2011    Inverted nipple     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 Menopause     LMP-?     Rotator cuff tear 8/2011    right    S/P FERNANDO-BSO 3/2/2012      Past Surgical History:   Procedure Laterality Date    ENDOSCOPY, COLON, DIAGNOSTIC  11/18/08    polyp (Brand)    HX HEENT  6/7/10    ELIAZAR O.D.    HX ORTHOPAEDIC  1992    R Elbow Fx - ORIF    HX FERNANDO AND BSO  1975    MT CARDIAC SURG PROCEDURE UNLIST  03/08    Pacemaker Battery Replacement    MT CARDIAC SURG PROCEDURE UNLIST  1996    Pacemaker Implant    MT CHEST SURGERY PROCEDURE UNLISTED      Pacemaker    MT REMVL PERM PM PLS GEN W/REPL PLSE GEN 2 LEAD SYS  8/18/2017          Current Outpatient Medications   Medication Sig Dispense Refill    benazepriL (LOTENSIN) 10 mg tablet TAKE ONE TABLET BY MOUTH ONE TIME DAILY 90 Tablet 1    amLODIPine (NORVASC) 2.5 mg tablet TAKE ONE TABLET BY MOUTH ONE TIME DAILY 90 Tablet 1    FLUoxetine (PROzac) 20 mg capsule TAKE ONE CAPSULE BY MOUTH ONE TIME DAILY 90 Capsule 1    IBUPROFEN (ADVIL PO) Take 1 Tablet by mouth nightly. cetirizine (ZYRTEC) 5 mg tablet Take 10 mg by mouth daily as needed. Allergies   Allergen Reactions    Ciprofloxacin Other (comments)     \"jittery\"    Lipitor [Atorvastatin] Myalgia    Mevacor [Lovastatin] Other (comments)     dyspepsia    Pcn [Penicillins] Hives    Zocor [Simvastatin] Myalgia       Family History   Problem Relation Age of Onset    Cancer Mother     Stroke Father      Social History     Tobacco Use    Smoking status: Former     Types: Cigarettes     Quit date: 4/8/2007     Years since quitting: 15.7    Smokeless tobacco: Never   Substance Use Topics    Alcohol use:  Yes     Alcohol/week: 5.8 - 8.3 standard drinks     Types: 7 - 10 Standard drinks or equivalent per week     Comment: pt states she drinks vodka and burbon everyday         Ammy Rossi MD

## 2023-01-06 ENCOUNTER — TELEPHONE (OUTPATIENT)
Dept: INTERNAL MEDICINE CLINIC | Age: 88
End: 2023-01-06

## 2023-01-06 NOTE — TELEPHONE ENCOUNTER
Srini with [facility] was close by the office so just stopped in. Informed him that there is no indicator on the form requiring signature. Gave signed order and insurance information for pt. No further action required.

## 2023-01-08 ENCOUNTER — PATIENT MESSAGE (OUTPATIENT)
Dept: INTERNAL MEDICINE CLINIC | Age: 88
End: 2023-01-08

## 2023-05-09 RX ORDER — BENAZEPRIL HYDROCHLORIDE 10 MG/1
TABLET ORAL
Qty: 90 TABLET | Refills: 1 | Status: SHIPPED | OUTPATIENT
Start: 2023-05-09

## 2023-05-09 RX ORDER — AMLODIPINE BESYLATE 2.5 MG/1
TABLET ORAL
Qty: 90 TABLET | Refills: 1 | Status: SHIPPED | OUTPATIENT
Start: 2023-05-09

## 2023-05-10 RX ORDER — BENAZEPRIL HYDROCHLORIDE 10 MG/1
TABLET ORAL
Qty: 90 TABLET | Refills: 1 | OUTPATIENT
Start: 2023-05-10

## 2023-05-10 RX ORDER — AMLODIPINE BESYLATE 2.5 MG/1
TABLET ORAL
Qty: 90 TABLET | Refills: 1 | OUTPATIENT
Start: 2023-05-10

## 2023-06-12 DIAGNOSIS — E11.59 TYPE 2 DIABETES MELLITUS WITH OTHER CIRCULATORY COMPLICATIONS (HCC): ICD-10-CM

## 2023-06-12 PROBLEM — F10.20 ALCOHOL DEPENDENCE, UNCOMPLICATED (HCC): Status: ACTIVE | Noted: 2023-06-12

## 2023-06-12 PROBLEM — N18.30 CHRONIC RENAL DISEASE, STAGE III (HCC): Status: ACTIVE | Noted: 2023-06-12

## 2023-06-13 LAB
ALBUMIN SERPL-MCNC: 3.8 G/DL (ref 3.5–5)
ALBUMIN/GLOB SERPL: 1.4 (ref 1.1–2.2)
ALP SERPL-CCNC: 65 U/L (ref 45–117)
ALT SERPL-CCNC: 21 U/L (ref 12–78)
ANION GAP SERPL CALC-SCNC: 4 MMOL/L (ref 5–15)
AST SERPL-CCNC: 15 U/L (ref 15–37)
BASOPHILS # BLD: 0.1 K/UL (ref 0–0.1)
BASOPHILS NFR BLD: 1 % (ref 0–1)
BILIRUB SERPL-MCNC: 0.6 MG/DL (ref 0.2–1)
BUN SERPL-MCNC: 19 MG/DL (ref 6–20)
BUN/CREAT SERPL: 20 (ref 12–20)
CALCIUM SERPL-MCNC: 9.3 MG/DL (ref 8.5–10.1)
CHLORIDE SERPL-SCNC: 107 MMOL/L (ref 97–108)
CHOLEST SERPL-MCNC: 228 MG/DL
CO2 SERPL-SCNC: 29 MMOL/L (ref 21–32)
CREAT SERPL-MCNC: 0.97 MG/DL (ref 0.55–1.02)
DIFFERENTIAL METHOD BLD: NORMAL
EOSINOPHIL # BLD: 0.1 K/UL (ref 0–0.4)
EOSINOPHIL NFR BLD: 1 % (ref 0–7)
ERYTHROCYTE [DISTWIDTH] IN BLOOD BY AUTOMATED COUNT: 13.4 % (ref 11.5–14.5)
EST. AVERAGE GLUCOSE BLD GHB EST-MCNC: 108 MG/DL
GLOBULIN SER CALC-MCNC: 2.8 G/DL (ref 2–4)
GLUCOSE SERPL-MCNC: 133 MG/DL (ref 65–100)
HBA1C MFR BLD: 5.4 % (ref 4–5.6)
HCT VFR BLD AUTO: 42.9 % (ref 35–47)
HDLC SERPL-MCNC: 81 MG/DL
HDLC SERPL: 2.8 (ref 0–5)
HGB BLD-MCNC: 13.6 G/DL (ref 11.5–16)
IMM GRANULOCYTES # BLD AUTO: 0 K/UL (ref 0–0.04)
IMM GRANULOCYTES NFR BLD AUTO: 0 % (ref 0–0.5)
LDLC SERPL CALC-MCNC: 129.6 MG/DL (ref 0–100)
LYMPHOCYTES # BLD: 1 K/UL (ref 0.8–3.5)
LYMPHOCYTES NFR BLD: 14 % (ref 12–49)
MCH RBC QN AUTO: 30.5 PG (ref 26–34)
MCHC RBC AUTO-ENTMCNC: 31.7 G/DL (ref 30–36.5)
MCV RBC AUTO: 96.2 FL (ref 80–99)
MONOCYTES # BLD: 0.8 K/UL (ref 0–1)
MONOCYTES NFR BLD: 12 % (ref 5–13)
NEUTS SEG # BLD: 4.8 K/UL (ref 1.8–8)
NEUTS SEG NFR BLD: 72 % (ref 32–75)
NRBC # BLD: 0 K/UL (ref 0–0.01)
NRBC BLD-RTO: 0 PER 100 WBC
PLATELET # BLD AUTO: 182 K/UL (ref 150–400)
PMV BLD AUTO: 11.6 FL (ref 8.9–12.9)
POTASSIUM SERPL-SCNC: 4.4 MMOL/L (ref 3.5–5.1)
PROT SERPL-MCNC: 6.6 G/DL (ref 6.4–8.2)
RBC # BLD AUTO: 4.46 M/UL (ref 3.8–5.2)
SODIUM SERPL-SCNC: 140 MMOL/L (ref 136–145)
TRIGL SERPL-MCNC: 87 MG/DL
VLDLC SERPL CALC-MCNC: 17.4 MG/DL
WBC # BLD AUTO: 6.8 K/UL (ref 3.6–11)

## 2023-07-09 DIAGNOSIS — F33.1 MAJOR DEPRESSIVE DISORDER, RECURRENT, MODERATE (HCC): Primary | ICD-10-CM

## 2023-07-10 RX ORDER — FLUOXETINE HYDROCHLORIDE 20 MG/1
CAPSULE ORAL
Qty: 90 CAPSULE | Refills: 0 | Status: SHIPPED | OUTPATIENT
Start: 2023-07-10

## 2023-09-14 ENCOUNTER — HOSPITAL ENCOUNTER (EMERGENCY)
Facility: HOSPITAL | Age: 88
Discharge: HOME | End: 2023-09-15
Attending: EMERGENCY MEDICINE
Payer: MEDICARE

## 2023-09-14 DIAGNOSIS — S22.080A COMPRESSION FRACTURE OF T11 VERTEBRA, INITIAL ENCOUNTER (HCC): ICD-10-CM
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
## 2023-09-14 PROCEDURE — 99284 EMERGENCY DEPT VISIT MOD MDM: CPT

## 2023-09-14 ASSESSMENT — PAIN SCALES - GENERAL: PAINLEVEL_OUTOF10: 7

## 2023-09-14 ASSESSMENT — PAIN DESCRIPTION - LOCATION: LOCATION: BACK

## 2023-09-14 ASSESSMENT — PAIN - FUNCTIONAL ASSESSMENT: PAIN_FUNCTIONAL_ASSESSMENT: 0-10

## 2023-09-14 ASSESSMENT — PAIN DESCRIPTION - DESCRIPTORS: DESCRIPTORS: ACHING

## 2023-09-14 ASSESSMENT — PAIN DESCRIPTION - ORIENTATION: ORIENTATION: LOWER

## 2023-09-15 ENCOUNTER — APPOINTMENT (OUTPATIENT)
Facility: HOSPITAL | Age: 88
End: 2023-09-15
Payer: MEDICARE

## 2023-09-15 VITALS
BODY MASS INDEX: 30.1 KG/M2 | HEART RATE: 78 BPM | DIASTOLIC BLOOD PRESSURE: 51 MMHG | RESPIRATION RATE: 18 BRPM | WEIGHT: 198.63 LBS | SYSTOLIC BLOOD PRESSURE: 115 MMHG | HEIGHT: 68 IN | OXYGEN SATURATION: 92 % | TEMPERATURE: 97.6 F

## 2023-09-15 PROCEDURE — 96372 THER/PROPH/DIAG INJ SC/IM: CPT

## 2023-09-15 PROCEDURE — 6370000000 HC RX 637 (ALT 250 FOR IP): Performed by: EMERGENCY MEDICINE

## 2023-09-15 PROCEDURE — 72100 X-RAY EXAM L-S SPINE 2/3 VWS: CPT

## 2023-09-15 PROCEDURE — 6360000002 HC RX W HCPCS: Performed by: EMERGENCY MEDICINE

## 2023-09-15 RX ORDER — ONDANSETRON 4 MG/1
4 TABLET, ORALLY DISINTEGRATING ORAL
Status: COMPLETED | OUTPATIENT
Start: 2023-09-15 | End: 2023-09-15

## 2023-09-15 RX ORDER — NAPROXEN 500 MG/1
500 TABLET ORAL 2 TIMES DAILY WITH MEALS
Qty: 20 TABLET | Refills: 0 | Status: SHIPPED | OUTPATIENT
Start: 2023-09-15 | End: 2023-09-15

## 2023-09-15 RX ORDER — LIDOCAINE 50 MG/G
1 PATCH TOPICAL DAILY
Qty: 10 PATCH | Refills: 0 | Status: SHIPPED | OUTPATIENT
Start: 2023-09-15 | End: 2023-09-25

## 2023-09-15 RX ORDER — LIDOCAINE 4 G/G
1 PATCH TOPICAL DAILY
Status: DISCONTINUED | OUTPATIENT
Start: 2023-09-15 | End: 2023-09-15 | Stop reason: HOSPADM

## 2023-09-15 RX ORDER — LIDOCAINE 50 MG/G
1 PATCH TOPICAL DAILY
Qty: 10 PATCH | Refills: 0 | Status: SHIPPED | OUTPATIENT
Start: 2023-09-15 | End: 2023-09-15

## 2023-09-15 RX ORDER — KETOROLAC TROMETHAMINE 30 MG/ML
15 INJECTION, SOLUTION INTRAMUSCULAR; INTRAVENOUS
Status: DISCONTINUED | OUTPATIENT
Start: 2023-09-15 | End: 2023-09-15

## 2023-09-15 RX ORDER — DIAZEPAM 5 MG/1
2.5 TABLET ORAL EVERY 6 HOURS PRN
Qty: 12 TABLET | Refills: 0 | Status: SHIPPED | OUTPATIENT
Start: 2023-09-15 | End: 2023-09-15

## 2023-09-15 RX ORDER — DIAZEPAM 5 MG/1
2.5 TABLET ORAL EVERY 6 HOURS PRN
Status: DISCONTINUED | OUTPATIENT
Start: 2023-09-15 | End: 2023-09-15 | Stop reason: HOSPADM

## 2023-09-15 RX ORDER — ACETAMINOPHEN 325 MG/1
650 TABLET ORAL
Status: COMPLETED | OUTPATIENT
Start: 2023-09-15 | End: 2023-09-15

## 2023-09-15 RX ORDER — DIAZEPAM 5 MG/1
2.5 TABLET ORAL ONCE
Status: COMPLETED | OUTPATIENT
Start: 2023-09-15 | End: 2023-09-15

## 2023-09-15 RX ORDER — KETOROLAC TROMETHAMINE 30 MG/ML
30 INJECTION, SOLUTION INTRAMUSCULAR; INTRAVENOUS
Status: COMPLETED | OUTPATIENT
Start: 2023-09-15 | End: 2023-09-15

## 2023-09-15 RX ORDER — DIAZEPAM 5 MG/1
5 TABLET ORAL EVERY 6 HOURS PRN
Qty: 12 TABLET | Refills: 0 | Status: SHIPPED | OUTPATIENT
Start: 2023-09-15 | End: 2023-09-25

## 2023-09-15 RX ORDER — NAPROXEN 500 MG/1
500 TABLET ORAL 2 TIMES DAILY
Qty: 60 TABLET | Refills: 0 | Status: SHIPPED | OUTPATIENT
Start: 2023-09-15

## 2023-09-15 RX ADMIN — ONDANSETRON 4 MG: 4 TABLET, ORALLY DISINTEGRATING ORAL at 01:25

## 2023-09-15 RX ADMIN — KETOROLAC TROMETHAMINE 30 MG: 30 INJECTION, SOLUTION INTRAMUSCULAR at 01:00

## 2023-09-15 RX ADMIN — DIAZEPAM 2.5 MG: 5 TABLET ORAL at 01:20

## 2023-09-15 RX ADMIN — ACETAMINOPHEN 650 MG: 325 TABLET ORAL at 00:51

## 2023-09-15 RX ADMIN — DIAZEPAM 2.5 MG: 5 TABLET ORAL at 03:05

## 2023-09-15 NOTE — ED TRIAGE NOTES
Patient arrived to ED from home via EMS. Per EMS patient had fall tonight, tripped over her feet , no LOC, A/Ox4, lower back pain 7/10, no blood thinner medications, no head injury. Patient was able to stand and take a few steps to the stretcher.

## 2023-09-15 NOTE — ED NOTES
Pt states \"I'm relaxing some\".   Able to move around in bed with less pain     Pereira [place], RN  09/15/23 [id number]

## 2023-09-15 NOTE — ED NOTES
Discharge instructions provided. Pt verbalized understanding. Opportunity provided for questions. Pt discharged home.       Arun Worley RN  09/15/23 3574

## 2023-09-15 NOTE — ED NOTES
Pt resting quietly in bed. Awaiting ED MD evaluation.   Daughters at [address] [place], [place]  09/14/23 7665

## 2023-09-15 NOTE — DISCHARGE INSTRUCTIONS
Your xray shows a compression fracture at T11 (thoracic vertebrae). Pleae follow up with [facility]. Thank you.

## 2023-09-15 NOTE — ED NOTES
Pt given meds as ordered. Pt complaining of nausea/  given zofran as ordered. Encouraging patient to eat crackers once nausea relieved. Family remains at bedside.      Kimber Greenberg RN  09/15/23 [id number]

## 2023-09-20 ENCOUNTER — TELEPHONE (OUTPATIENT)
Age: 88
End: 2023-09-20

## 2023-09-22 ENCOUNTER — OFFICE VISIT (OUTPATIENT)
Age: 88
End: 2023-09-22
Payer: MEDICARE

## 2023-09-22 VITALS
TEMPERATURE: 97 F | HEART RATE: 96 BPM | OXYGEN SATURATION: 90 % | RESPIRATION RATE: 16 BRPM | DIASTOLIC BLOOD PRESSURE: 66 MMHG | SYSTOLIC BLOOD PRESSURE: 100 MMHG
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
## 2023-09-22 DIAGNOSIS — Z23 NEEDS FLU SHOT: ICD-10-CM

## 2023-09-22 DIAGNOSIS — R10.13 DYSPEPSIA: Primary | ICD-10-CM

## 2023-09-22 DIAGNOSIS — K59.00 CONSTIPATION, UNSPECIFIED CONSTIPATION TYPE: ICD-10-CM

## 2023-09-22 PROCEDURE — 99213 OFFICE O/P EST LOW 20 MIN: CPT | Performed by: INTERNAL MEDICINE

## 2023-09-22 PROCEDURE — 90471 IMMUNIZATION ADMIN: CPT | Performed by: INTERNAL MEDICINE

## 2023-09-22 NOTE — PATIENT INSTRUCTIONS
Avoid following foods for the next 2-3 weeks. Coffee    Tea    Sodas   Alcohol   Cheese   Salads - raw onions, peppers, & beans    [place] food   [place] foods   [place] foods   No vinegars or citrus foods   No dairy other than yogurt   Chips   Fried foods   Red meats   Chocolate   Peppermints           Chewing Gum       For next 3 weeks, please take pepcid 20mg in the morning and in the evening. For constipation   -may use miralax 17 grams daily and/or add prunes daily to diet.

## 2023-09-25 ENCOUNTER — TELEPHONE (OUTPATIENT)
Age: 88
End: 2023-09-25

## 2023-09-26 ENCOUNTER — TRANSCRIBE ORDERS (OUTPATIENT)
Facility: HOSPITAL | Age: 88
End: 2023-09-26

## 2023-09-26 DIAGNOSIS — M51.36 DDD (DEGENERATIVE DISC DISEASE), LUMBAR: ICD-10-CM

## 2023-09-26 DIAGNOSIS — S22.000A COMPRESSION FRACTURE OF THORACIC VERTEBRA, UNSPECIFIED THORACIC VERTEBRAL LEVEL, INITIAL ENCOUNTER (HCC): ICD-10-CM

## 2023-09-26 DIAGNOSIS — M54.50 LUMBAR PAIN: ICD-10-CM

## 2023-09-26 DIAGNOSIS — S32.010A COMPRESSION FRACTURE OF L1 VERTEBRA, INITIAL ENCOUNTER (HCC): Primary | ICD-10-CM

## 2023-09-27 ENCOUNTER — HOSPITAL ENCOUNTER (OUTPATIENT)
Facility: HOSPITAL | Age: 88
Discharge: HOME | End: 2023-09-30
Attending: ORTHOPAEDIC SURGERY
Payer: MEDICARE

## 2023-09-27 DIAGNOSIS — M54.50 LUMBAR PAIN: ICD-10-CM

## 2023-09-27 DIAGNOSIS — M51.36 DDD (DEGENERATIVE DISC DISEASE), LUMBAR: ICD-10-CM

## 2023-09-27 DIAGNOSIS — S32.010A COMPRESSION FRACTURE OF L1 VERTEBRA, INITIAL ENCOUNTER (HCC): ICD-10-CM

## 2023-09-27 DIAGNOSIS — S22.000A COMPRESSION FRACTURE OF THORACIC VERTEBRA, UNSPECIFIED THORACIC VERTEBRAL LEVEL, INITIAL ENCOUNTER (HCC): ICD-10-CM

## 2023-09-27 PROCEDURE — 72131 CT LUMBAR SPINE W/O DYE: CPT

## 2023-10-02 ENCOUNTER — TELEPHONE (OUTPATIENT)
Age: 88
End: 2023-10-02

## 2023-10-02 DIAGNOSIS — S32.010S COMPRESSION FRACTURE OF L1 VERTEBRA, SEQUELA: Primary | ICD-10-CM

## 2023-10-02 NOTE — TELEPHONE ENCOUNTER
----- Message from Zane Crawford sent at 10/2/2023  4:27 PM EDT -----  Subject: Referral Request    Reason for referral request? The patient's daughter Elvi Napoleon called on   10/2/2023. She is requesting a referral for home healthcare care and home   health Physcial Therapy for the patients legs and back. please contact the   patient daughter Mao Schafer when the referral is approved. She would   prefer a company that accepts the patient insurance   Provider patient wants to be referred to(if known):     Provider Phone Number(if known):     Additional Information for Provider?   ---------------------------------------------------------------------------  --------------  [address] [place] [address] Matias    [phone]; OK to leave message on voicemail  ---------------------------------------------------------------------------  --------------

## 2023-10-03 ENCOUNTER — TELEPHONE (OUTPATIENT)
Age: 88
End: 2023-10-03

## 2023-10-03 ENCOUNTER — HOME HEALTH ADMISSION (OUTPATIENT)
Dept: HOME HEALTH SERVICES | Facility: HOME HEALTH | Age: 88
End: 2023-10-03
Payer: MEDICARE

## 2023-10-03 NOTE — TELEPHONE ENCOUNTER
MD Derek [facility] 15 minutes ago (9:31 AM)       Please let her know that [facility] health also ordered.      Paul Figueroa MD

## 2023-10-03 NOTE — TELEPHONE ENCOUNTER
Spoke with shayla, orders for home health, pt & ot must  come from the same company in order for insurance to cover it. Unable to  reach daughter to discuss.  Sent message to pcp

## 2023-10-03 NOTE — TELEPHONE ENCOUNTER
Spoke with patient's daughter. She is requesting an order for home health also.  She reports that her mom needs help with daily activities like bathing and dressing

## 2023-10-03 NOTE — TELEPHONE ENCOUNTER
MD Derek [facility] 17 minutes ago (8:19 AM)       Please let her know that I sent a referral to [facility] PT. They should be contacting them soon.      Jovanni Bahena MD

## 2023-10-03 NOTE — TELEPHONE ENCOUNTER
Nisha from [facility] called regarding an order she received for home health for the patient. She said that the patient is already receiving home health from [facility] and cannot use two agencies.     Melba Pearce - [phone]

## 2023-10-03 NOTE — TELEPHONE ENCOUNTER
Paul Figueroa MD  You 1 hour ago (2:59 PM)       Can you cancel services with [facility] and have [facility] do the pt/ot and home health?      Thanks   Paul Figueroa MD

## 2023-10-05 ENCOUNTER — HOME CARE VISIT (OUTPATIENT)
Dept: HOME HEALTH SERVICES | Facility: HOME HEALTH | Age: 88
End: 2023-10-05

## 2023-10-07 DIAGNOSIS — F33.1 MAJOR DEPRESSIVE DISORDER, RECURRENT, MODERATE (HCC): ICD-10-CM

## 2023-10-08 RX ORDER — FLUOXETINE HYDROCHLORIDE 20 MG/1
CAPSULE ORAL
Qty: 90 CAPSULE | Refills: 1 | Status: SHIPPED | OUTPATIENT
Start: 2023-10-08

## 2023-10-09 ENCOUNTER — TELEPHONE (OUTPATIENT)
Age: 88
End: 2023-10-09

## 2023-10-09 ENCOUNTER — HOME CARE VISIT (OUTPATIENT)
Facility: HOME HEALTH | Age: 88
End: 2023-10-09

## 2023-10-09 VITALS
DIASTOLIC BLOOD PRESSURE: 64 MMHG | TEMPERATURE: 98 F | OXYGEN SATURATION: 98 % | RESPIRATION RATE: 18 BRPM | SYSTOLIC BLOOD PRESSURE: 98 MMHG | WEIGHT: 190 LBS | HEIGHT: 68 IN | BODY MASS INDEX: 28.79 KG/M2 | HEART RATE: 108 BPM

## 2023-10-09 DIAGNOSIS — R19.09 ABDOMINAL MASS OF OTHER SITE: Primary | ICD-10-CM

## 2023-10-09 PROCEDURE — 0221000100 HH NO PAY CLAIM PROCEDURE

## 2023-10-09 PROCEDURE — G0151 HHCP-SERV OF PT,EA 15 MIN: HCPCS

## 2023-10-09 ASSESSMENT — ENCOUNTER SYMPTOMS
PAIN LOCATION - PAIN QUALITY: ACHY
DYSPNEA ACTIVITY LEVEL: AFTER AMBULATING 10 - 20 FT

## 2023-10-09 NOTE — TELEPHONE ENCOUNTER
Reason for call:  daughter wants to know about the soft tissue mass in the gastro splenic region and would like someone to call to discuss this before she schedules an appt to bring her mom into the office    Is this a new problem: Yes    Date of last appointment:  9/22/2023     Can we respond via Quepasat: No    Best call back number:    Channing Boggs ([id number] [facility]) [phone] [facility]

## 2023-10-09 NOTE — TELEPHONE ENCOUNTER
Patient has appt tomorrow to see Nikole Luevano re CT scan results (in care everywhere)- do you want to see patient regarding this instead?

## 2023-10-10 ENCOUNTER — TELEPHONE (OUTPATIENT)
Age: 88
End: 2023-10-10

## 2023-10-10 ENCOUNTER — HOME CARE VISIT (OUTPATIENT)
Facility: HOME HEALTH | Age: 88
End: 2023-10-10

## 2023-10-10 VITALS
OXYGEN SATURATION: 96 % | TEMPERATURE: 97.5 F | SYSTOLIC BLOOD PRESSURE: 100 MMHG | DIASTOLIC BLOOD PRESSURE: 60 MMHG | HEART RATE: 71 BPM

## 2023-10-10 PROCEDURE — G0152 HHCP-SERV OF OT,EA 15 MIN: HCPCS

## 2023-10-10 NOTE — TELEPHONE ENCOUNTER
Reason for call:  Needs a verbal order for a home health aid    Is this a new problem: Yes    Date of last appointment:  9/22/2023     Can we respond via panpant: No    Best call back number:   Martine Rea [phone]

## 2023-10-10 NOTE — TELEPHONE ENCOUNTER
10/10/2023 spoke with patient's daughter, Lisette Kelly done with Dr. Love Rice, ortho reviewed. 3.  Indeterminate ovoid soft tissue density focus in the gastrosplenic region. This could reflect a lymph node, atypical splenule, etc. Correlate with any   available prior dedicated abdominal imaging to document stability of this   finding or consider a dedicated nonemergent contrast enhanced abdominal MRI for   further characterization.     -she is not able to get MRI due to having a pacemaker.  -will order CT abdomen with contrast.     Patient stated understanding of the instructions and plan for medical care as outlined.       Orders Placed This Encounter    CT ABDOMEN W WO CONTRAST     Standing Status:   Future     Standing Expiration Date:   10/10/2024     Order Specific Question:   Additional Contrast?     Answer:   Radiologist Recommendation     Order Specific Question:   STAT Creatinine as needed:     Answer:   No     Order Specific Question:   Reason for exam:     Answer:   abnormal CT done with ortho showing ovoid soft tissue density in gastrosplenic region

## 2023-10-10 NOTE — HOME HEALTH
Reason for referral, PMH SUMMARY of clinical condition:      Patient is a 81yo female referred to [address] [place] [address],[address] services following fall 9.14.23 with patient sustaining T11-L1 compression fx. Patient provided with brace with dtr reporting patient only has to wear brace for long distance support. PMH significant for complete AV block with pacemaker, DM, HTN, DDD, hyperlipidemia. Dtr also indicates patient has some cognitive deficits although not identified as part of patient's hx. Patient has experienced multiple falls with patient moving in with dtr after latest fall where she is expected to remain living long term. Clinical Assessment/Skilled reason for admission to home health (What this means for the patient overall and need for ongoing skilled care):   Patient presents this visit reporting no pain at rest or with activity and able to follow directions/cues with hearing aids in place. Patient currently not wearing brace for inhome ambulation. Dtr present throughout and provided majority of patient medical and social hx due to patient's inaccurate reports according to dtr. Patient currently receiving assist of family for shower bathing 2x week with assist required for bathing, dressing safely although patient able to manage basic toileting without assist.  Patient has no interest in resuming any level of IADLs with family available to manage IADLs including medication management for accuracy. No new DME needs identified. OT educated dtr/patient on role of HHOT and what HHA could provide as service to patient. Dtr is interested in having HHA assist to relieve her of shower tasks 2x week as long as PT is following. OT will request order for HHA from PCP and complete care plan but PT will need to supervise. They may benefit from MSW referral to assist with identifying resources for in home care.   No further skilled OT service planned at this time due to patient's long term need for ADL assist for safety based on

## 2023-10-10 NOTE — HOME HEALTH
Pt trying to give urine specimen Karen Cardenas, VIRAJ  05/25/20 8251     training. Strength Gross B LES 2+-3/5  Bed mob- Bed is high- pt has diff getting in and just lays on her stomach and tried to turn around. Has diff rolling and scooting- so Pt tries to reach for the head board which puts stress on her back. Will benefit from bed rail. Transfers- pt has diff getting up- uses a stand up recliner to a higher position. Uses both hand to push down. Amb- pt uses quad cane- wide NICHOLAS, unsteady. HR was 109 after walk. sob. Pt does have a Rolator and is not using that currently. May benefit from using Rolator. Endurance- gets tried and SOB easily. 6/10 modified alana's. Balance- [facility] 10- 9/10, Tinetti-    8/28 indicating high risk for falls. Diagnosis: fall and s/p Compression Fx of Spine age undetermined    Subjective (statement from pt/cg that is relative to why you are there): I want to be able to walk without pain. Caregiver: daughter Edil Carvajal. Caregiver assists with: Medications, Meals, Bathing, ADL, IADL, Transportation and Housekeeping Caregiver unable to assist with: NA. Caregiver is available 24 hours/day Caregiver is  present at this visit and did participate with clinician. Medications reconciled and all medications are available in the home this visit. The following education was provided regarding medications:   High alert medication teaching on Prozac (enter name of medication), Antipsychotic medication education Purpose, dose, and frequency, Side effects such as dizziness, lightheadedness, dry mouth, constipation, nausea, vomiting, headache, and weight gain and Potential complications such as suicidal ideation/behaviors, metabolic abnormalities, sedation, movement disorders, hormone changes, and sexual dysfunction   Patient/CG able to demonstrate knowledge through teach back with 75 percent accuracy. A list of reconciled medications has been given to the patient/caregiver  and uploaded to media. High risk med teaching was performed on Prozac.     Patient at

## 2023-10-11 ENCOUNTER — HOME CARE VISIT (OUTPATIENT)
Facility: HOME HEALTH | Age: 88
End: 2023-10-11

## 2023-10-11 ENCOUNTER — TELEPHONE (OUTPATIENT)
Age: 88
End: 2023-10-11

## 2023-10-11 VITALS
HEART RATE: 90 BPM | SYSTOLIC BLOOD PRESSURE: 104 MMHG | TEMPERATURE: 98 F | DIASTOLIC BLOOD PRESSURE: 62 MMHG | OXYGEN SATURATION: 96 % | RESPIRATION RATE: 16 BRPM

## 2023-10-11 PROCEDURE — G0151 HHCP-SERV OF PT,EA 15 MIN: HCPCS

## 2023-10-11 NOTE — HOME HEALTH
Subjective: I am doing well- just tired. PT enquired if they had ordered a bed rail for her. Daughter said she will order it tonight. Falls since last visit No(if yes complete the Fall Tracking Form and include bsrifallreport):   Caregiver involvement changes: none  Home health supplies by type and quantity ordered/delivered this visit include: none    Clinician asked if patient has had any physician contact since last home care visit and patient states: No  Clinician asked if patient has any new or changed medications and patient states:  NO   If Yes, were medications reconciled? N/A   Was the certifying physician notified of changes in medications? N/A     Clinical assessment (what this visit means for the patient overall and need for ongoing skilled care) and progress or lack of progress towards SPECIFIC goals:  B le exs in sitting x 10 with rests. Needs vc and visual cues to complete end ranges of exs. Stand march and Heel raises x 10- -was dizzy x 2.    Amb- pt walked 20 x 2  to kitchen and back with  Rolator and one long sitting rest. Was sob and tired. I am ready to take a nap now. Per daughter she is  Not drinking enough water. PT  explainer to pt importance of drinking fluids to prevent dehydration and UTI. She has verbalized understanding. Written Teaching Material Utilized: PT issued written Hep and instructed pt/cg to do only the sitting exs at this time. Interdisciplinary communication with: Jose Luis Leon for the purpose of POC collaboration    Discharge planning as follows: Is no longer homebound, Per physician order, When patient is no longer able to participate or progresses to SNF/Hospice, Will discharge when the patient has reached their maximum functional potential and maximum safety in their home and When goals are met    Specific plan for next visit: increase strength in b LE,s transfers, gait.

## 2023-10-11 NOTE — TELEPHONE ENCOUNTER
James Moore, from [facility],  called regarding getting an order for a home health aid for the patient.     James Moore - [phone]

## 2023-10-12 ENCOUNTER — HOSPITAL ENCOUNTER (OUTPATIENT)
Facility: HOSPITAL | Age: 88
Discharge: HOME | End: 2023-10-12
Attending: INTERNAL MEDICINE
Payer: MEDICARE

## 2023-10-12 DIAGNOSIS — R19.09 ABDOMINAL MASS OF OTHER SITE: ICD-10-CM

## 2023-10-12 LAB — CREAT BLD-MCNC: 1 MG/DL (ref 0.6–1.3)

## 2023-10-12 PROCEDURE — 74160 CT ABDOMEN W/CONTRAST: CPT

## 2023-10-12 PROCEDURE — 6360000004 HC RX CONTRAST MEDICATION: Performed by: INTERNAL MEDICINE

## 2023-10-12 PROCEDURE — 82565 ASSAY OF CREATININE: CPT

## 2023-10-12 RX ADMIN — IOPAMIDOL 100 ML: 755 INJECTION, SOLUTION INTRAVENOUS at 16:15

## 2023-10-13 ENCOUNTER — TELEPHONE (OUTPATIENT)
Age: 88
End: 2023-10-13

## 2023-10-13 ENCOUNTER — HOME CARE VISIT (OUTPATIENT)
Facility: HOME HEALTH | Age: 88
End: 2023-10-13

## 2023-10-13 DIAGNOSIS — R91.8 RIGHT LOWER LOBE LUNG MASS: Primary | ICD-10-CM

## 2023-10-13 NOTE — TELEPHONE ENCOUNTER
10/13/2023 spoke with daughter Marcelino Lake,    - discussed results of abdominal Ct. -spleen splenule noted              - right lower lobe spiculated mass noted. Plan  -will consult pulmonology  - will get CT of lung. She agrees with plan. Given number to pulmonology associates.

## 2023-10-16 ENCOUNTER — HOME CARE VISIT (OUTPATIENT)
Facility: HOME HEALTH | Age: 88
End: 2023-10-16

## 2023-10-17 ENCOUNTER — HOSPITAL ENCOUNTER (OUTPATIENT)
Facility: HOSPITAL | Age: 88
Discharge: HOME | End: 2023-10-20
Attending: INTERNAL MEDICINE
Payer: MEDICARE

## 2023-10-17 ENCOUNTER — HOME CARE VISIT (OUTPATIENT)
Facility: HOME HEALTH | Age: 88
End: 2023-10-17

## 2023-10-17 DIAGNOSIS — R91.8 RIGHT LOWER LOBE LUNG MASS: ICD-10-CM

## 2023-10-17 PROCEDURE — 6360000004 HC RX CONTRAST MEDICATION: Performed by: INTERNAL MEDICINE

## 2023-10-17 PROCEDURE — 71260 CT THORAX DX C+: CPT

## 2023-10-17 RX ADMIN — IOPAMIDOL 100 ML: 755 INJECTION, SOLUTION INTRAVENOUS at 12:55

## 2023-10-18 ENCOUNTER — HOME CARE VISIT (OUTPATIENT)
Facility: HOME HEALTH | Age: 88
End: 2023-10-18

## 2023-10-18 VITALS
HEART RATE: 103 BPM | OXYGEN SATURATION: 97 % | SYSTOLIC BLOOD PRESSURE: 82 MMHG | DIASTOLIC BLOOD PRESSURE: 60 MMHG | RESPIRATION RATE: 18 BRPM | TEMPERATURE: 97.5 F

## 2023-10-18 PROCEDURE — G0151 HHCP-SERV OF PT,EA 15 MIN: HCPCS

## 2023-10-18 ASSESSMENT — ENCOUNTER SYMPTOMS: PAIN LOCATION - PAIN QUALITY: ACHY

## 2023-10-18 NOTE — HOME HEALTH
Subjective: Pt was tired from yesterday- dtr said she had to go to [facility] to get ID because her license had , had to get ct done. Falls since last visit Yes(if yes complete the Fall Tracking Form and include bsrifallreport):   Caregiver involvement changes: none  Home health supplies by type and quantity ordered/delivered this visit include: none    Clinician asked if patient has had any physician contact since last home care visit and patient states: NO  Clinician asked if patient has any new or changed medications and patient states:  N/A   If Yes, were medications reconciled? N/A   Was the certifying physician notified of changes in medications? N/A     Clinical assessment (what this visit means for the patient overall and need for ongoing skilled care) and progress or lack of progress towards SPECIFIC goals:   PT reports that she was tired today. Today's session included B Le exs in sitting 2 x 10 with rests. Attempted stand exs with walker but pt c/o back pain and had to sit down. Amb with Rolator 50 x 1 vc to stay close to walker. reports that she still has the pain and she wants to sit and rest. Pt's daughter gave her Tylenol just before we started the exs. Written Teaching Material Utilized: written Hep    Interdisciplinary communication with: OT/HHA for the purpose of POC collaboration    Discharge planning as follows:  Is no longer homebound, Per physician order, When patient is no longer able to participate or progresses to SNF/Hospice, Will discharge when the patient has reached their maximum functional potential and maximum safety in their home and When goals are met    Specific plan for next visit: increase strength in B LE,s transfers, gait

## 2023-10-19 ENCOUNTER — HOME CARE VISIT (OUTPATIENT)
Facility: HOME HEALTH | Age: 88
End: 2023-10-19

## 2023-10-19 ENCOUNTER — TELEPHONE (OUTPATIENT)
Age: 88
End: 2023-10-19

## 2023-10-19 DIAGNOSIS — R91.8 ABNORMAL CT SCAN OF LUNG: Primary | ICD-10-CM

## 2023-10-19 PROCEDURE — G0151 HHCP-SERV OF PT,EA 15 MIN: HCPCS

## 2023-10-19 NOTE — TELEPHONE ENCOUNTER
Reason for call:  TC from Joann Greene, Daughter. Daughter on PHI. Pt id verified. Mr. Cathryn Ramirez calling to get pt's latest CT scan results.      Is this a new problem: No    Date of last appointment:  9/22/2023     Can we respond via [email or web address]: No    Best call back number: [phone]

## 2023-10-20 ENCOUNTER — HOME CARE VISIT (OUTPATIENT)
Facility: HOME HEALTH | Age: 88
End: 2023-10-20

## 2023-10-20 VITALS
TEMPERATURE: 97.4 F | SYSTOLIC BLOOD PRESSURE: 94 MMHG | HEART RATE: 91 BPM | OXYGEN SATURATION: 96 % | DIASTOLIC BLOOD PRESSURE: 60 MMHG | RESPIRATION RATE: 18 BRPM

## 2023-10-20 NOTE — HOME HEALTH
Subjective: I am doing good today. Just had my shower and had lunch. Falls since last visit No(if yes complete the Fall Tracking Form and include bsrifallreport):   Caregiver involvement changes: none  Home health supplies by type and quantity ordered/delivered this visit include: none    Clinician asked if patient has had any physician contact since last home care visit and patient states: NO  Clinician asked if patient has any new or changed medications and patient states:  NO   If Yes, were medications reconciled? N/A   Was the certifying physician notified of changes in medications? N/A     Clinical assessment (what this visit means for the patient overall and need for ongoing skilled care) and progress or lack of progress towards SPECIFIC goals: Today's session included B Le exs in sitting  2 x 15 LAQ,HF, H ABD, Ap with 1 long rest in between. Attempted stand exs at the sink- heel raises x 10- but then pt reported that she was feeling dizzy. Had her sit down. O 2 was 97 and . ( came down to 88 in one min). Amb with Rolator to the kitchen and back with one sitting rest - instructed pt to sit down in her recliner with her feet elevated. Pt reports feeling better after sometime. Pt's tolerance for therapy is limited due to unstable vitals. Needs long rests in between. Kept saying that she has to get her pacemaker checked. Written Teaching Material Utilized: written Hep    Interdisciplinary communication with: deandra sal for the purpose of POC collaboration    Discharge planning as follows:  Is no longer homebound, Per physician order, When patient is no longer able to participate or progresses to SNF/Hospice, Will discharge when the patient has reached their maximum functional potential and maximum safety in their home and When goals are met    Specific plan for next visit: increase strength in B LEs, transfers and gait

## 2023-10-23 ENCOUNTER — HOME CARE VISIT (OUTPATIENT)
Facility: HOME HEALTH | Age: 88
End: 2023-10-23
Payer: MEDICARE

## 2023-10-23 VITALS
RESPIRATION RATE: 18 BRPM | HEART RATE: 80 BPM | TEMPERATURE: 97.8 F | SYSTOLIC BLOOD PRESSURE: 96 MMHG | OXYGEN SATURATION: 97 % | DIASTOLIC BLOOD PRESSURE: 62 MMHG

## 2023-10-23 PROCEDURE — G0151 HHCP-SERV OF PT,EA 15 MIN: HCPCS

## 2023-10-23 NOTE — HOME HEALTH
Subjective: Last week Family decided that they would like to discontinue HHA- the timing was not suiting their schedule. Pt said her back has been bothering her a lot today. Falls since last visit No(if yes complete the Fall Tracking Form and include bsrifallreport):   Caregiver involvement changes: none  Home health supplies by type and quantity ordered/delivered this visit include: none    Clinician asked if patient has had any physician contact since last home care visit and patient states: NO  Clinician asked if patient has any new or changed medications and patient states:  NO   If Yes, were medications reconciled? N/A   Was the certifying physician notified of changes in medications? N/A     Clinical assessment (what this visit means for the patient overall and need for ongoing skilled care) and progress or lack of progress towards SPECIFIC goals: Today's session included B le ex sin sitting and=attempted to stand exs but pt c/o pain in her back and wanted to sit down. Pt did not reach back for the chair and flopped back. PT had to remind her that she has to slow down  and do controlled descent because it is not good for her back. pt said -she forgets and I reminded her that she can bring her Chair up next time so she does not have to go down too far. Pt has agreed. Also the dtr said they had not given her a tylenol today. Will give her one as soon as I leave. Attempted- Amb with Rolator indoors 40 x 2 slow antalgic gait pattern. Visual and tactile cues to relax shoulder girdle. Written Teaching Material Utilized: written Hep    Interdisciplinary communication with: na   Discharge planning as follows:  Is no longer homebound, Per physician order, When patient is no longer able to participate or progresses to SNF/Hospice, Will discharge when the patient has reached their maximum functional potential and maximum safety in their home and When goals are met    Specific plan for next visit: increase strength

## 2023-10-24 ENCOUNTER — TELEPHONE (OUTPATIENT)
Age: 88
End: 2023-10-24

## 2023-10-24 NOTE — TELEPHONE ENCOUNTER
----- Message from [facility] sent at 10/23/2023  2:05 PM EDT -----  Subject: Message to Provider    QUESTIONS  Information for Provider? Mao Schafer called to obtain test results for   patient and stated she called last Thursday and Friday. Please call her at   [phone].   ---------------------------------------------------------------------------  --------------  [address]  [phone]; OK to leave message on voicemail  ---------------------------------------------------------------------------  --------------  SCRIPT ANSWERS  Relationship to Patient? Other/Third Party  Representative Name? Mao Schafer  Is the representative on the Communication Release of Information (NINA)   form in Epic?  Yes

## 2023-10-25 ENCOUNTER — HOME CARE VISIT (OUTPATIENT)
Facility: HOME HEALTH | Age: 88
End: 2023-10-25
Payer: MEDICARE

## 2023-10-25 ENCOUNTER — HOME CARE VISIT (OUTPATIENT)
Dept: HOME HEALTH SERVICES | Facility: HOME HEALTH | Age: 88
End: 2023-10-25
Payer: MEDICARE

## 2023-10-25 VITALS
DIASTOLIC BLOOD PRESSURE: 60 MMHG | OXYGEN SATURATION: 95 % | TEMPERATURE: 97.8 F | RESPIRATION RATE: 16 BRPM | SYSTOLIC BLOOD PRESSURE: 90 MMHG | HEART RATE: 115 BPM

## 2023-10-25 PROCEDURE — G0151 HHCP-SERV OF PT,EA 15 MIN: HCPCS

## 2023-10-26 ENCOUNTER — HOME CARE VISIT (OUTPATIENT)
Facility: HOME HEALTH | Age: 88
End: 2023-10-26
Payer: MEDICARE

## 2023-10-26 VITALS
DIASTOLIC BLOOD PRESSURE: 62 MMHG | RESPIRATION RATE: 18 BRPM | SYSTOLIC BLOOD PRESSURE: 100 MMHG | TEMPERATURE: 97.4 F | OXYGEN SATURATION: 93 % | HEART RATE: 77 BPM

## 2023-10-26 PROCEDURE — G0151 HHCP-SERV OF PT,EA 15 MIN: HCPCS

## 2023-10-26 ASSESSMENT — ENCOUNTER SYMPTOMS: PAIN LOCATION - PAIN QUALITY: ACHY

## 2023-10-26 NOTE — HOME HEALTH
Subjective: I haven't moved much today so my back is better. I also took my tylenol before you came. Falls since last visit No(if yes complete the Fall Tracking Form and include bsrifallreport):   Caregiver involvement changes: none  Home health supplies by type and quantity ordered/delivered this visit include: none    Clinician asked if patient has had any physician contact since last home care visit and patient states: NO  Clinician asked if patient has any new or changed medications and patient states:  N/A   If Yes, were medications reconciled? N/A   Was the certifying physician notified of changes in medications? N/A     Clinical assessment (what this visit means for the patient overall and need for ongoing skilled care) and progress or lack of progress towards SPECIFIC goals: PT instructed pt on B LE exs in sitting 2 x 15 today with rests in between. Stand exs with support- heel raises, marching, H abd x 10. Amb with Rollator indoors VC to stay close to walker 40 x 2. Written Teaching Material Utilized: Written HEP    Interdisciplinary communication with: N/A     Discharge planning as follows:  Is no longer homebound, Per physician order, When patient is no longer able to participate or progresses to SNF/Hospice, Will discharge when the patient has reached their maximum functional potential and maximum safety in their home and When goals are met    Specific plan for next visit: Instruct caregiver/patient in Safe tnrasfers and B Le exs in sitting and standing

## 2023-10-26 NOTE — TELEPHONE ENCOUNTER
10/26/2023 left message on Irma's VM- returning her call    10/27/2023 spoke with [facility], patient's daughter    -patient has seen pulmonologist, Dr. Misael Schafer, regarding pulmonary lesion, Tuesday, started abx on Wednesday. Has follow up in about 3 weeks for reevaluation and another CT    -noting her mom's blood pressure has been quite low for several of her provider visits now. She is taking benazepril 10mg daily and amlodipine 2.5mg daily. - systolic in the low 522'S. Recommend stopping amlodipine    -with recent health issues, moving in with daughter and now her daughter is closely watching her etoh intake. Has noted that her depression is still present with use of prozac 20mg daily. -- will increase dose to 40mg daily. Has established follow up in December, 2023.     -still has dyspepsia with use of pepcid 20mg daily. Trying to make dietary adjustments, but still consuming foods that irritate dyspepsia. - increase pepcid to 40mg daily. Patient stated understanding of the instructions and plan for medical care as outlined.

## 2023-10-27 NOTE — HOME HEALTH
Subjective: I forgot you were coming today. Yang said they have found a mass under her lungs that they are treating as an abscess.-Pulmonologist on Tues. He has ordered 2 new meds  Antibiotics,  and an inhaler. Dayo Mathews will then do another CT scan  to see if this has cleared up. Falls since last visit No(if yes complete the Fall Tracking Form and include bsrifallreport):   Caregiver involvement changes: none  Home health supplies by type and quantity ordered/delivered this visit include: none    Clinician asked if patient has had any physician contact since last home care visit and patient states: NO  Clinician asked if patient has any new or changed medications and patient states:  NO   If Yes, were medications reconciled? N/A   Was the certifying physician notified of changes in medications? N/A     Clinical assessment (what this visit means for the patient overall and need for ongoing skilled care) and progress or lack of progress towards SPECIFIC goals:   Pt instructed pt on B le exs in sitting- Laq,HF, H Abd, Ap 2 x 15 with long rests and vc to count out loud. Pt was holding her breadth. Stand exs with walker- heel raises, H flex, marching x 10 with 1 long stand rest in between. Pt reports that she has walked this morning to the other side of the house to take a shower and was exhausted. I don't think I can walk again. PT tried to explain to her that she needs to keep moving and get up every hr.         Written Teaching Material Utilized: written Hep    Interdisciplinary communication with: ALY   Discharge planning as follows:  Is no longer homebound, Per physician order, When patient is no longer able to participate or progresses to SNF/Hospice, Will discharge when the patient has reached their maximum functional potential and maximum safety in their home and When goals are met

## 2023-10-30 ENCOUNTER — TELEPHONE (OUTPATIENT)
Age: 88
End: 2023-10-30

## 2023-10-30 NOTE — TELEPHONE ENCOUNTER
Reason for call:  TC from Maxi Rios. Ms. Jatinder Perez on [facility]. Ms. Jatinder Perez states she is going to be taking pt to ER soon and wanted to talk to nurse/PCP before taking her.      Is this a new problem: Yes    Date of last appointment:  9/22/2023     Can we respond via Dat[employer]: No    Best call back number: [phone]

## 2023-10-31 ENCOUNTER — HOSPITAL ENCOUNTER (INPATIENT)
Facility: HOSPITAL | Age: 88
LOS: 8 days | Discharge: NURSING, REHAB OR LONG-TERM CARE | DRG: 982 | End: 2023-11-08
Attending: STUDENT IN AN ORGANIZED HEALTH CARE EDUCATION/TRAINING PROGRAM | Admitting: FAMILY MEDICINE
Payer: MEDICARE

## 2023-10-31 ENCOUNTER — APPOINTMENT (OUTPATIENT)
Facility: HOSPITAL | Age: 88
DRG: 982 | End: 2023-10-31
Payer: MEDICARE

## 2023-10-31 DIAGNOSIS — M80.08XA AGE-RELATED OSTEOPOROSIS WITH CURRENT PATHOLOGICAL FRACTURE, VERTEBRA(E), INITIAL ENCOUNTER FOR FRACTURE (HCC): ICD-10-CM

## 2023-10-31 DIAGNOSIS — M79.605 LOWER EXTREMITY PAIN, BILATERAL: ICD-10-CM

## 2023-10-31 DIAGNOSIS — M79.604 LOWER EXTREMITY PAIN, BILATERAL: ICD-10-CM

## 2023-10-31 DIAGNOSIS — R09.02 HYPOXIA: ICD-10-CM

## 2023-10-31 DIAGNOSIS — J18.9 POSTOBSTRUCTIVE PNEUMONIA: Primary | ICD-10-CM

## 2023-10-31 LAB
ALBUMIN SERPL-MCNC: 3 G/DL (ref 3.5–5)
ALBUMIN/GLOB SERPL: 0.7 (ref 1.1–2.2)
ALP SERPL-CCNC: 90 U/L (ref 45–117)
ALT SERPL-CCNC: 12 U/L (ref 12–78)
ANION GAP SERPL CALC-SCNC: 7 MMOL/L (ref 5–15)
AST SERPL-CCNC: 14 U/L (ref 15–37)
BASOPHILS # BLD: 0.1 K/UL (ref 0–0.1)
BASOPHILS NFR BLD: 0 % (ref 0–1)
BILIRUB SERPL-MCNC: 0.4 MG/DL (ref 0.2–1)
BUN SERPL-MCNC: 20 MG/DL (ref 6–20)
BUN/CREAT SERPL: 16 (ref 12–20)
CALCIUM SERPL-MCNC: 9.7 MG/DL (ref 8.5–10.1)
CHLORIDE SERPL-SCNC: 100 MMOL/L (ref 97–108)
CO2 SERPL-SCNC: 26 MMOL/L (ref 21–32)
COMMENT:: NORMAL
COMMENT:: NORMAL
CREAT SERPL-MCNC: 1.28 MG/DL (ref 0.55–1.02)
DIFFERENTIAL METHOD BLD: ABNORMAL
EOSINOPHIL # BLD: 0.1 K/UL (ref 0–0.4)
EOSINOPHIL NFR BLD: 0 % (ref 0–7)
ERYTHROCYTE [DISTWIDTH] IN BLOOD BY AUTOMATED COUNT: 13.1 % (ref 11.5–14.5)
GLOBULIN SER CALC-MCNC: 4.4 G/DL (ref 2–4)
GLUCOSE SERPL-MCNC: 128 MG/DL (ref 65–100)
HCT VFR BLD AUTO: 32.2 % (ref 35–47)
HGB BLD-MCNC: 10.4 G/DL (ref 11.5–16)
IMM GRANULOCYTES # BLD AUTO: 0.2 K/UL (ref 0–0.04)
IMM GRANULOCYTES NFR BLD AUTO: 1 % (ref 0–0.5)
LACTATE BLD-SCNC: 0.99 MMOL/L (ref 0.4–2)
LYMPHOCYTES # BLD: 0.9 K/UL (ref 0.8–3.5)
LYMPHOCYTES NFR BLD: 6 % (ref 12–49)
MCH RBC QN AUTO: 30.1 PG (ref 26–34)
MCHC RBC AUTO-ENTMCNC: 32.3 G/DL (ref 30–36.5)
MCV RBC AUTO: 93.1 FL (ref 80–99)
MONOCYTES # BLD: 1.8 K/UL (ref 0–1)
MONOCYTES NFR BLD: 11 % (ref 5–13)
NEUTS SEG # BLD: 13.1 K/UL (ref 1.8–8)
NEUTS SEG NFR BLD: 82 % (ref 32–75)
NRBC # BLD: 0 K/UL (ref 0–0.01)
NRBC BLD-RTO: 0 PER 100 WBC
PLATELET # BLD AUTO: 387 K/UL (ref 150–400)
PMV BLD AUTO: 10.2 FL (ref 8.9–12.9)
POTASSIUM SERPL-SCNC: 4 MMOL/L (ref 3.5–5.1)
PROCALCITONIN SERPL-MCNC: 0.55 NG/ML
PROT SERPL-MCNC: 7.4 G/DL (ref 6.4–8.2)
RBC # BLD AUTO: 3.46 M/UL (ref 3.8–5.2)
SODIUM SERPL-SCNC: 133 MMOL/L (ref 136–145)
SPECIMEN HOLD: NORMAL
SPECIMEN HOLD: NORMAL
TROPONIN I SERPL HS-MCNC: 8 NG/L (ref 0–51)
WBC # BLD AUTO: 16 K/UL (ref 3.6–11)

## 2023-10-31 PROCEDURE — 99285 EMERGENCY DEPT VISIT HI MDM: CPT

## 2023-10-31 PROCEDURE — 84145 PROCALCITONIN (PCT): CPT

## 2023-10-31 PROCEDURE — 96375 TX/PRO/DX INJ NEW DRUG ADDON: CPT

## 2023-10-31 PROCEDURE — 6360000002 HC RX W HCPCS: Performed by: STUDENT IN AN ORGANIZED HEALTH CARE EDUCATION/TRAINING PROGRAM

## 2023-10-31 PROCEDURE — 2060000000 HC ICU INTERMEDIATE R&B

## 2023-10-31 PROCEDURE — 83605 ASSAY OF LACTIC ACID: CPT

## 2023-10-31 PROCEDURE — 96365 THER/PROPH/DIAG IV INF INIT: CPT

## 2023-10-31 PROCEDURE — 80053 COMPREHEN METABOLIC PANEL: CPT

## 2023-10-31 PROCEDURE — 85025 COMPLETE CBC W/AUTO DIFF WBC: CPT

## 2023-10-31 PROCEDURE — 2580000003 HC RX 258: Performed by: STUDENT IN AN ORGANIZED HEALTH CARE EDUCATION/TRAINING PROGRAM

## 2023-10-31 PROCEDURE — 6370000000 HC RX 637 (ALT 250 FOR IP): Performed by: FAMILY MEDICINE

## 2023-10-31 PROCEDURE — 87040 BLOOD CULTURE FOR BACTERIA: CPT

## 2023-10-31 PROCEDURE — 71250 CT THORAX DX C-: CPT

## 2023-10-31 PROCEDURE — 36415 COLL VENOUS BLD VENIPUNCTURE: CPT

## 2023-10-31 PROCEDURE — 84484 ASSAY OF TROPONIN QUANT: CPT

## 2023-10-31 PROCEDURE — 87154 CUL TYP ID BLD PTHGN 6+ TRGT: CPT

## 2023-10-31 RX ORDER — ACETAMINOPHEN 650 MG/1
650 SUPPOSITORY RECTAL EVERY 6 HOURS PRN
Status: DISCONTINUED | OUTPATIENT
Start: 2023-10-31 | End: 2023-11-08 | Stop reason: HOSPADM

## 2023-10-31 RX ORDER — ONDANSETRON 4 MG/1
4 TABLET, ORALLY DISINTEGRATING ORAL EVERY 8 HOURS PRN
Status: DISCONTINUED | OUTPATIENT
Start: 2023-10-31 | End: 2023-11-08 | Stop reason: HOSPADM

## 2023-10-31 RX ORDER — SODIUM CHLORIDE 9 MG/ML
INJECTION, SOLUTION INTRAVENOUS PRN
Status: DISCONTINUED | OUTPATIENT
Start: 2023-10-31 | End: 2023-11-08 | Stop reason: HOSPADM

## 2023-10-31 RX ORDER — ONDANSETRON 2 MG/ML
4 INJECTION INTRAMUSCULAR; INTRAVENOUS EVERY 6 HOURS PRN
Status: DISCONTINUED | OUTPATIENT
Start: 2023-10-31 | End: 2023-11-08 | Stop reason: HOSPADM

## 2023-10-31 RX ORDER — ACETAMINOPHEN 325 MG/1
650 TABLET ORAL EVERY 6 HOURS PRN
Status: DISCONTINUED | OUTPATIENT
Start: 2023-10-31 | End: 2023-11-08 | Stop reason: HOSPADM

## 2023-10-31 RX ORDER — SODIUM CHLORIDE 0.9 % (FLUSH) 0.9 %
5-40 SYRINGE (ML) INJECTION PRN
Status: DISCONTINUED | OUTPATIENT
Start: 2023-10-31 | End: 2023-11-08 | Stop reason: HOSPADM

## 2023-10-31 RX ORDER — ONDANSETRON 4 MG/1
4 TABLET, ORALLY DISINTEGRATING ORAL ONCE
Status: DISCONTINUED | OUTPATIENT
Start: 2023-10-31 | End: 2023-11-08 | Stop reason: HOSPADM

## 2023-10-31 RX ORDER — ENOXAPARIN SODIUM 100 MG/ML
40 INJECTION SUBCUTANEOUS DAILY
Status: DISCONTINUED | OUTPATIENT
Start: 2023-11-01 | End: 2023-11-08 | Stop reason: HOSPADM

## 2023-10-31 RX ORDER — SODIUM CHLORIDE 0.9 % (FLUSH) 0.9 %
5-40 SYRINGE (ML) INJECTION EVERY 12 HOURS SCHEDULED
Status: DISCONTINUED | OUTPATIENT
Start: 2023-10-31 | End: 2023-11-08 | Stop reason: HOSPADM

## 2023-10-31 RX ORDER — POLYETHYLENE GLYCOL 3350 17 G/17G
17 POWDER, FOR SOLUTION ORAL DAILY PRN
Status: DISCONTINUED | OUTPATIENT
Start: 2023-10-31 | End: 2023-11-08 | Stop reason: HOSPADM

## 2023-10-31 RX ADMIN — WATER 1000 MG: 1 INJECTION INTRAMUSCULAR; INTRAVENOUS; SUBCUTANEOUS at 22:22

## 2023-10-31 RX ADMIN — AZITHROMYCIN MONOHYDRATE 500 MG: 500 INJECTION, POWDER, LYOPHILIZED, FOR SOLUTION INTRAVENOUS at 22:23

## 2023-10-31 RX ADMIN — ACETAMINOPHEN 650 MG: 325 TABLET ORAL at 23:50

## 2023-10-31 ASSESSMENT — PAIN SCALES - GENERAL: PAINLEVEL_OUTOF10: 0

## 2023-10-31 ASSESSMENT — PAIN - FUNCTIONAL ASSESSMENT: PAIN_FUNCTIONAL_ASSESSMENT: NONE - DENIES PAIN

## 2023-10-31 NOTE — ED NOTES
Pt is a 79 yo F who presents to the ED for decreased appetite and PO intake for 5-6 days. Eating very little. Reports recent lung abscess and treated with clindamycin roughly 10 days ago. Reports taking 5 days worth of antibiotics however stopped giving it to the pt because of diarrhea. Concern lung abscess has not been treated properly due to early cessation of antibiotics and concern about decreased PO intake before starting the antibiotics. Recent fall - T11 fracture. 4:34 PM  I have evaluated the patient as the Provider in Rapid Medical Evaluation (RME). I have reviewed her vital signs and the triage nurse assessment. I have talked with the patient and any available family and advised that I am the provider in triage and have ordered the appropriate study to initiate their work up based on the clinical presentation during my assessment. I have advised that the patient will be accommodated in the Main ED as soon as possible. I have also requested to contact the triage nurse or myself immediately if the patient experiences any changes in their condition during this brief waiting period.   FLAQUITO Banks PA-C  10/31/23 [id number]

## 2023-10-31 NOTE — ED TRIAGE NOTES
Pt's daughter reports she has not been eating for the past 5 days. She also reports a recent diagnosis of a lung abscess that is being treated with clindamycin. They are also worried that her lungs are not improving.

## 2023-11-01 ENCOUNTER — HOME CARE VISIT (OUTPATIENT)
Dept: HOME HEALTH SERVICES | Facility: HOME HEALTH | Age: 88
End: 2023-11-01
Payer: MEDICARE

## 2023-11-01 ENCOUNTER — APPOINTMENT (OUTPATIENT)
Facility: HOSPITAL | Age: 88
DRG: 982 | End: 2023-11-01
Attending: FAMILY MEDICINE
Payer: MEDICARE

## 2023-11-01 LAB
ANION GAP SERPL CALC-SCNC: 8 MMOL/L (ref 5–15)
BASOPHILS # BLD: 0.1 K/UL (ref 0–0.1)
BASOPHILS NFR BLD: 1 % (ref 0–1)
BUN SERPL-MCNC: 23 MG/DL (ref 6–20)
BUN/CREAT SERPL: 19 (ref 12–20)
CALCIUM SERPL-MCNC: 9.8 MG/DL (ref 8.5–10.1)
CHLORIDE SERPL-SCNC: 105 MMOL/L (ref 97–108)
CO2 SERPL-SCNC: 24 MMOL/L (ref 21–32)
CREAT SERPL-MCNC: 1.19 MG/DL (ref 0.55–1.02)
DIFFERENTIAL METHOD BLD: ABNORMAL
EOSINOPHIL # BLD: 0.1 K/UL (ref 0–0.4)
EOSINOPHIL NFR BLD: 1 % (ref 0–7)
ERYTHROCYTE [DISTWIDTH] IN BLOOD BY AUTOMATED COUNT: 13 % (ref 11.5–14.5)
GLUCOSE BLD STRIP.AUTO-MCNC: 102 MG/DL (ref 65–117)
GLUCOSE BLD STRIP.AUTO-MCNC: 117 MG/DL (ref 65–117)
GLUCOSE BLD STRIP.AUTO-MCNC: 124 MG/DL (ref 65–117)
GLUCOSE BLD STRIP.AUTO-MCNC: 143 MG/DL (ref 65–117)
GLUCOSE SERPL-MCNC: 101 MG/DL (ref 65–100)
HCT VFR BLD AUTO: 29.2 % (ref 35–47)
HGB BLD-MCNC: 9.2 G/DL (ref 11.5–16)
IMM GRANULOCYTES # BLD AUTO: 0.1 K/UL (ref 0–0.04)
IMM GRANULOCYTES NFR BLD AUTO: 1 % (ref 0–0.5)
LYMPHOCYTES # BLD: 1 K/UL (ref 0.8–3.5)
LYMPHOCYTES NFR BLD: 8 % (ref 12–49)
MCH RBC QN AUTO: 29.6 PG (ref 26–34)
MCHC RBC AUTO-ENTMCNC: 31.5 G/DL (ref 30–36.5)
MCV RBC AUTO: 93.9 FL (ref 80–99)
MONOCYTES # BLD: 1.6 K/UL (ref 0–1)
MONOCYTES NFR BLD: 13 % (ref 5–13)
NEUTS SEG # BLD: 10 K/UL (ref 1.8–8)
NEUTS SEG NFR BLD: 76 % (ref 32–75)
NRBC # BLD: 0 K/UL (ref 0–0.01)
NRBC BLD-RTO: 0 PER 100 WBC
PLATELET # BLD AUTO: 332 K/UL (ref 150–400)
PMV BLD AUTO: 10.1 FL (ref 8.9–12.9)
POTASSIUM SERPL-SCNC: 3.8 MMOL/L (ref 3.5–5.1)
RBC # BLD AUTO: 3.11 M/UL (ref 3.8–5.2)
SERVICE CMNT-IMP: ABNORMAL
SERVICE CMNT-IMP: ABNORMAL
SERVICE CMNT-IMP: NORMAL
SERVICE CMNT-IMP: NORMAL
SODIUM SERPL-SCNC: 137 MMOL/L (ref 136–145)
WBC # BLD AUTO: 13 K/UL (ref 3.6–11)

## 2023-11-01 PROCEDURE — 85025 COMPLETE CBC W/AUTO DIFF WBC: CPT

## 2023-11-01 PROCEDURE — 94640 AIRWAY INHALATION TREATMENT: CPT

## 2023-11-01 PROCEDURE — 6360000002 HC RX W HCPCS: Performed by: FAMILY MEDICINE

## 2023-11-01 PROCEDURE — 82962 GLUCOSE BLOOD TEST: CPT

## 2023-11-01 PROCEDURE — 2060000000 HC ICU INTERMEDIATE R&B

## 2023-11-01 PROCEDURE — 2580000003 HC RX 258: Performed by: FAMILY MEDICINE

## 2023-11-01 PROCEDURE — 6370000000 HC RX 637 (ALT 250 FOR IP): Performed by: FAMILY MEDICINE

## 2023-11-01 PROCEDURE — C9113 INJ PANTOPRAZOLE SODIUM, VIA: HCPCS | Performed by: FAMILY MEDICINE

## 2023-11-01 PROCEDURE — 36415 COLL VENOUS BLD VENIPUNCTURE: CPT

## 2023-11-01 PROCEDURE — 93970 EXTREMITY STUDY: CPT

## 2023-11-01 PROCEDURE — 6370000000 HC RX 637 (ALT 250 FOR IP)

## 2023-11-01 PROCEDURE — 6360000002 HC RX W HCPCS: Performed by: HOSPITALIST

## 2023-11-01 PROCEDURE — 2700000000 HC OXYGEN THERAPY PER DAY

## 2023-11-01 PROCEDURE — 80048 BASIC METABOLIC PNL TOTAL CA: CPT

## 2023-11-01 PROCEDURE — 99223 1ST HOSP IP/OBS HIGH 75: CPT | Performed by: INTERNAL MEDICINE

## 2023-11-01 PROCEDURE — A4216 STERILE WATER/SALINE, 10 ML: HCPCS | Performed by: FAMILY MEDICINE

## 2023-11-01 PROCEDURE — 94760 N-INVAS EAR/PLS OXIMETRY 1: CPT

## 2023-11-01 RX ORDER — AMLODIPINE BESYLATE 5 MG/1
2.5 TABLET ORAL DAILY
Status: DISCONTINUED | OUTPATIENT
Start: 2023-11-01 | End: 2023-11-08 | Stop reason: HOSPADM

## 2023-11-01 RX ORDER — LORAZEPAM 2 MG/ML
0.5 INJECTION INTRAMUSCULAR PRN
Status: DISCONTINUED | OUTPATIENT
Start: 2023-11-01 | End: 2023-11-08 | Stop reason: HOSPADM

## 2023-11-01 RX ORDER — LANOLIN ALCOHOL/MO/W.PET/CERES
3 CREAM (GRAM) TOPICAL NIGHTLY PRN
Status: DISCONTINUED | OUTPATIENT
Start: 2023-11-01 | End: 2023-11-08 | Stop reason: HOSPADM

## 2023-11-01 RX ORDER — TRAMADOL HYDROCHLORIDE 50 MG/1
50 TABLET ORAL EVERY 6 HOURS PRN
Status: DISCONTINUED | OUTPATIENT
Start: 2023-11-01 | End: 2023-11-08 | Stop reason: HOSPADM

## 2023-11-01 RX ORDER — DEXTROSE MONOHYDRATE 100 MG/ML
INJECTION, SOLUTION INTRAVENOUS CONTINUOUS PRN
Status: DISCONTINUED | OUTPATIENT
Start: 2023-11-01 | End: 2023-11-08 | Stop reason: HOSPADM

## 2023-11-01 RX ORDER — CETIRIZINE HYDROCHLORIDE 10 MG/1
10 TABLET ORAL DAILY
Status: DISCONTINUED | OUTPATIENT
Start: 2023-11-01 | End: 2023-11-08 | Stop reason: HOSPADM

## 2023-11-01 RX ORDER — INSULIN LISPRO 100 [IU]/ML
0-4 INJECTION, SOLUTION INTRAVENOUS; SUBCUTANEOUS
Status: DISCONTINUED | OUTPATIENT
Start: 2023-11-01 | End: 2023-11-08 | Stop reason: HOSPADM

## 2023-11-01 RX ORDER — LISINOPRIL 10 MG/1
10 TABLET ORAL DAILY
Status: DISCONTINUED | OUTPATIENT
Start: 2023-11-01 | End: 2023-11-08 | Stop reason: HOSPADM

## 2023-11-01 RX ORDER — INSULIN LISPRO 100 [IU]/ML
0-4 INJECTION, SOLUTION INTRAVENOUS; SUBCUTANEOUS NIGHTLY
Status: DISCONTINUED | OUTPATIENT
Start: 2023-11-01 | End: 2023-11-08 | Stop reason: HOSPADM

## 2023-11-01 RX ORDER — FLUOXETINE HYDROCHLORIDE 20 MG/1
20 CAPSULE ORAL DAILY
Status: DISCONTINUED | OUTPATIENT
Start: 2023-11-01 | End: 2023-11-08 | Stop reason: HOSPADM

## 2023-11-01 RX ORDER — LACTOBACILLUS RHAMNOSUS GG 10B CELL
1 CAPSULE ORAL
Status: DISCONTINUED | OUTPATIENT
Start: 2023-11-02 | End: 2023-11-08 | Stop reason: HOSPADM

## 2023-11-01 RX ADMIN — SODIUM CHLORIDE, PRESERVATIVE FREE 10 ML: 5 INJECTION INTRAVENOUS at 20:53

## 2023-11-01 RX ADMIN — IPRATROPIUM BROMIDE 0.5 MG: 0.5 SOLUTION RESPIRATORY (INHALATION) at 15:49

## 2023-11-01 RX ADMIN — PANTOPRAZOLE SODIUM 40 MG: 40 INJECTION, POWDER, FOR SOLUTION INTRAVENOUS at 09:47

## 2023-11-01 RX ADMIN — ARFORMOTEROL TARTRATE: 15 SOLUTION RESPIRATORY (INHALATION) at 20:29

## 2023-11-01 RX ADMIN — FLUOXETINE 20 MG: 20 CAPSULE ORAL at 09:46

## 2023-11-01 RX ADMIN — SODIUM CHLORIDE, PRESERVATIVE FREE 10 ML: 5 INJECTION INTRAVENOUS at 00:42

## 2023-11-01 RX ADMIN — ENOXAPARIN SODIUM 40 MG: 100 INJECTION SUBCUTANEOUS at 09:47

## 2023-11-01 RX ADMIN — SODIUM CHLORIDE, PRESERVATIVE FREE 10 ML: 5 INJECTION INTRAVENOUS at 08:56

## 2023-11-01 RX ADMIN — IPRATROPIUM BROMIDE 0.5 MG: 0.5 SOLUTION RESPIRATORY (INHALATION) at 20:29

## 2023-11-01 RX ADMIN — CEFEPIME 2000 MG: 2 INJECTION, POWDER, FOR SOLUTION INTRAVENOUS at 17:22

## 2023-11-01 RX ADMIN — ACETAMINOPHEN 650 MG: 325 TABLET ORAL at 20:53

## 2023-11-01 RX ADMIN — CEFEPIME 2000 MG: 2 INJECTION, POWDER, FOR SOLUTION INTRAVENOUS at 06:22

## 2023-11-01 RX ADMIN — CETIRIZINE HYDROCHLORIDE 10 MG: 10 TABLET, FILM COATED ORAL at 09:47

## 2023-11-01 RX ADMIN — TRAMADOL HYDROCHLORIDE 50 MG: 50 TABLET ORAL at 17:22

## 2023-11-01 RX ADMIN — IPRATROPIUM BROMIDE 0.5 MG: 0.5 SOLUTION RESPIRATORY (INHALATION) at 11:53

## 2023-11-01 RX ADMIN — ARFORMOTEROL TARTRATE: 15 SOLUTION RESPIRATORY (INHALATION) at 10:01

## 2023-11-01 RX ADMIN — Medication 3 MG: at 22:34

## 2023-11-01 RX ADMIN — VANCOMYCIN HYDROCHLORIDE 2000 MG: 10 INJECTION, POWDER, LYOPHILIZED, FOR SOLUTION INTRAVENOUS at 07:15

## 2023-11-01 RX ADMIN — LISINOPRIL 10 MG: 10 TABLET ORAL at 09:50

## 2023-11-01 RX ADMIN — IPRATROPIUM BROMIDE 0.5 MG: 0.5 SOLUTION RESPIRATORY (INHALATION) at 10:01

## 2023-11-01 ASSESSMENT — PAIN DESCRIPTION - ORIENTATION: ORIENTATION: MID

## 2023-11-01 ASSESSMENT — PAIN DESCRIPTION - LOCATION: LOCATION: BACK

## 2023-11-01 ASSESSMENT — PAIN SCALES - GENERAL
PAINLEVEL_OUTOF10: 1
PAINLEVEL_OUTOF10: 4
PAINLEVEL_OUTOF10: 0

## 2023-11-01 ASSESSMENT — PAIN DESCRIPTION - DESCRIPTORS: DESCRIPTORS: ACHING;CRAMPING

## 2023-11-01 NOTE — ED NOTES
Pt brought to rm 7 via wc and placed on monitor x3.  Family at the bedside for support      Kenya Gordillo  10/31/23 2032

## 2023-11-01 NOTE — ED PROVIDER NOTES
Glucose 128 (*)     Creatinine 1.28 (*)     Est, Glom Filt Rate 40 (*)     AST 14 (*)     Albumin 3.0 (*)     Globulin 4.4 (*)     Albumin/Globulin Ratio 0.7 (*)     All other components within normal limits   CULTURE, BLOOD 1   CULTURE, BLOOD 2   EXTRA TUBES HOLD   EXTRA TUBES HOLD   POC LACTIC ACID   PROCALCITONIN   TROPONIN   URINALYSIS WITH REFLEX TO CULTURE   POCT LACTIC ACID   POCT LACTIC ACID   POCT GLUCOSE       All other labs were within normal range or not returned as of this dictation. EMERGENCY DEPARTMENT COURSE and DIFFERENTIAL DIAGNOSIS/MDM:   Vitals:    Vitals:    10/31/23 2022 10/31/23 2030 10/31/23 2100 10/31/23 2130   BP: 133/70 126/65 (!) 112/56 134/69   Pulse: (!) 101 96 98 94   Resp: 24 22 28 18   Temp:       TempSrc:       SpO2: 91% 91% 90% 90%   Weight:               Medical Decision Making  Lung abscess, postobstructive pneumonia, sepsis. 69-year-old female presented emergency department with decreased p.o. intake x1 week recently had been treated for possible lung abscess/pneumonia. Patient has leukocytosis, CT chest shows a new moderate medial right lower lobe lung consolidation which is likely postobstructive pneumonia with a right trace pleural effusion. Sepsis order set placed empiric antibiotics ordered patient will require admission. Amount and/or Complexity of Data Reviewed  Labs: ordered. ECG/medicine tests: ordered. Risk  Decision regarding hospitalization. REASSESSMENT            CONSULTS:  None    PROCEDURES:  Unless otherwise noted below, none     Procedures      FINAL IMPRESSION      1. Postobstructive pneumonia    2. Hypoxia          DISPOSITION/PLAN   DISPOSITION Decision To Admit 10/31/2023 10:31:16 PM      PATIENT REFERRED TO:  No follow-up provider specified.     DISCHARGE MEDICATIONS:  New Prescriptions    No medications on file         (Please note that portions of this note were completed with a voice recognition program.  Efforts were made to edit

## 2023-11-01 NOTE — ED NOTES
Pt resting comfortably on stretcher with eyes closed and chest rising and falling appropriately.  Pt awaiting a clean bed assignment     Nat Pérez RN  11/01/23 0055

## 2023-11-01 NOTE — CONSULTS
Cardiac Electrophysiology [facility] Initial Visit Note          Subjective:         Vivian Mason is an 80 y.o. patient who is seen for evaluation of dual chamber pacemaker. It is [employer] and has replaced in August 2017   She is pacer dependent   The pacemaker lead is from [address] [place] [address] and RV pacing impedance is chronically elevated. She thinks the initial pacemaker was implanted by Dr. Shahbaz Grant at [facility]      She is admitted with diarrhea and post obstructive pneumonia RLL abscess and has been on antibiotic  She does not think her sx is better yet  Her daughter is at bedside    She has known T3 and T11 fracture and spine MRI was requested so Dr Jodee Conner asked me if pacer can be ok for MRI                    Problem List                            Codes  Class  Noted             Type 2 diabetes with nephropathy ([address])  ICD-10-CM: E11.21   ICD-9-CM: 250.40, 583.81    5/23/2019                       PAT (paroxysmal atrial tachycardia) ([address])  ICD-10-CM: I47.1   ICD-9-CM: 427.0    12/19/2017                       Diabetic eye exam ([address]) (Chronic)  ICD-10-CM: Z01.00, E11.9   ICD-9-CM: V72.0, 250.00    8/26/2016          Overview Addendum 9/20/2018  5:55 PM by MD CHELSI Jose Dr. Lauren Pilling, 6/7/18                                      Advance directive discussed with patient (Chronic)  ICD-10-CM: Z71.89   ICD-9-CM: V65.49    4/13/2016          Overview Signed 4/13/2016 12:45 PM by Beverly Mendez MD            4/13/2016 . Has one established and will bring in for chart. Diabetes type 2, controlled ([address])  ICD-10-CM: E11.9   ICD-9-CM: 250.00    10/10/2014          Overview Signed 10/10/2014  1:49 PM by Beverly Mendez MD            Diet controlled. Colon cancer screening (Chronic)  ICD-10-CM: Z12.11   ICD-9-CM: V76.51    9/5/2012          Overview Addendum 3/29/2013 11:34 AM by MD Dr. Sinan Jose.   2010,

## 2023-11-01 NOTE — H&P
History and Physical    Date of Service:  10/31/2023  Primary Care Provider: Ayla Berry MD  Source of information: patient, family member - daughter at bedside, electronic medical record    Chief Complaint: No chief complaint on file. History of Presenting Illness:   Jessi Caldera is a 80 y.o. female with a pmhx CHB s/p PPM, DM II, HTN, dyslipidemia, and recent lung abscess treated with clindamycin who presents with fatigue, decreased appetite, and falls,and is being admitted for postobstructive pna. She was previously treated for a lung abscess with clindamycin for 5 days, but patient developed diarrhea over the past 2 to 3 days, and stopped the clindamycin. She subsequently began to feel fatigued, and had decreased appetite so presented to the ED for further evaluation. In the ED, HR was elevated to 101. Other VSS. Labs showed  WBC 16, Hgb 13.6>10.4, creatinine 1.28, albumin 3,  and procal 0.55. CT chest showed new medial RLL consolidation likely post obstructive pneumonia surrounding previous opacity with central attenuation, moderate non-specific esophagitis with esophageal wall thickening, and acute/subacute severe T11 and mild inferior endplate T3 vertebral fractures. In the ED, she received ceftriaxone, and azithromycin            REVIEW OF SYSTEMS:  A comprehensive review of systems was negative except for that written in the History of Present Illness. 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 Menopause     LMP-?     Other and unspecified hyperlipidemia 4/4/2011    Rotator cuff tear 8/2011    right    S/P ERNESTINE-BSO 3/2/2012      Past Surgical

## 2023-11-02 LAB
ACCESSION NUMBER, LLC1M: ABNORMAL
ACINETOBACTER CALCOAC BAUMANNII COMPLEX BY PCR: NOT DETECTED
ANION GAP SERPL CALC-SCNC: 6 MMOL/L (ref 5–15)
B FRAGILIS DNA BLD POS QL NAA+NON-PROBE: NOT DETECTED
BACTERIA SPEC CULT: ABNORMAL
BACTERIA SPEC CULT: ABNORMAL
BACTERIA SPEC CULT: NORMAL
BACTERIA SPEC CULT: NORMAL
BASOPHILS # BLD: 0.1 K/UL (ref 0–0.1)
BASOPHILS NFR BLD: 1 % (ref 0–1)
BIOFIRE TEST COMMENT: ABNORMAL
BUN SERPL-MCNC: 18 MG/DL (ref 6–20)
BUN/CREAT SERPL: 21 (ref 12–20)
C ALBICANS DNA BLD POS QL NAA+NON-PROBE: NOT DETECTED
C AURIS DNA BLD POS QL NAA+NON-PROBE: NOT DETECTED
C GATTII+NEOFOR DNA BLD POS QL NAA+N-PRB: NOT DETECTED
C GLABRATA DNA BLD POS QL NAA+NON-PROBE: NOT DETECTED
C KRUSEI DNA BLD POS QL NAA+NON-PROBE: NOT DETECTED
C PARAP DNA BLD POS QL NAA+NON-PROBE: NOT DETECTED
C TROPICLS DNA BLD POS QL NAA+NON-PROBE: NOT DETECTED
CALCIUM SERPL-MCNC: 9 MG/DL (ref 8.5–10.1)
CHLORIDE SERPL-SCNC: 106 MMOL/L (ref 97–108)
CO2 SERPL-SCNC: 24 MMOL/L (ref 21–32)
CREAT SERPL-MCNC: 0.86 MG/DL (ref 0.55–1.02)
DIFFERENTIAL METHOD BLD: ABNORMAL
E CLOAC COMP DNA BLD POS NAA+NON-PROBE: NOT DETECTED
E COLI DNA BLD POS QL NAA+NON-PROBE: NOT DETECTED
E FAECALIS DNA BLD POS QL NAA+NON-PROBE: NOT DETECTED
E FAECIUM DNA BLD POS QL NAA+NON-PROBE: NOT DETECTED
ECHO BSA: 2 M2
ENTEROBACTERALES DNA BLD POS NAA+N-PRB: NOT DETECTED
EOSINOPHIL # BLD: 0.2 K/UL (ref 0–0.4)
EOSINOPHIL NFR BLD: 2 % (ref 0–7)
ERYTHROCYTE [DISTWIDTH] IN BLOOD BY AUTOMATED COUNT: 12.9 % (ref 11.5–14.5)
GLUCOSE BLD STRIP.AUTO-MCNC: 101 MG/DL (ref 65–117)
GLUCOSE BLD STRIP.AUTO-MCNC: 125 MG/DL (ref 65–117)
GLUCOSE BLD STRIP.AUTO-MCNC: 130 MG/DL (ref 65–117)
GLUCOSE BLD STRIP.AUTO-MCNC: 140 MG/DL (ref 65–117)
GLUCOSE SERPL-MCNC: 104 MG/DL (ref 65–100)
GP B STREP DNA BLD POS QL NAA+NON-PROBE: NOT DETECTED
HAEM INFLU DNA BLD POS QL NAA+NON-PROBE: NOT DETECTED
HCT VFR BLD AUTO: 28.1 % (ref 35–47)
HGB BLD-MCNC: 9.1 G/DL (ref 11.5–16)
IMM GRANULOCYTES # BLD AUTO: 0.1 K/UL (ref 0–0.04)
IMM GRANULOCYTES NFR BLD AUTO: 1 % (ref 0–0.5)
K OXYTOCA DNA BLD POS QL NAA+NON-PROBE: NOT DETECTED
KLEBSIELLA SP DNA BLD POS QL NAA+NON-PRB: NOT DETECTED
KLEBSIELLA SP DNA BLD POS QL NAA+NON-PRB: NOT DETECTED
L MONOCYTOG DNA BLD POS QL NAA+NON-PROBE: NOT DETECTED
LYMPHOCYTES # BLD: 0.8 K/UL (ref 0.8–3.5)
LYMPHOCYTES NFR BLD: 7 % (ref 12–49)
MCH RBC QN AUTO: 30 PG (ref 26–34)
MCHC RBC AUTO-ENTMCNC: 32.4 G/DL (ref 30–36.5)
MCV RBC AUTO: 92.7 FL (ref 80–99)
MECA+MECC ISLT/SPM QL: NOT DETECTED
MONOCYTES # BLD: 1.4 K/UL (ref 0–1)
MONOCYTES NFR BLD: 12 % (ref 5–13)
N MEN DNA BLD POS QL NAA+NON-PROBE: NOT DETECTED
NEUTS SEG # BLD: 9.3 K/UL (ref 1.8–8)
NEUTS SEG NFR BLD: 77 % (ref 32–75)
NRBC # BLD: 0 K/UL (ref 0–0.01)
NRBC BLD-RTO: 0 PER 100 WBC
P AERUGINOSA DNA BLD POS NAA+NON-PROBE: NOT DETECTED
PLATELET # BLD AUTO: 325 K/UL (ref 150–400)
PMV BLD AUTO: 10 FL (ref 8.9–12.9)
POTASSIUM SERPL-SCNC: 4.2 MMOL/L (ref 3.5–5.1)
PROTEUS SP DNA BLD POS QL NAA+NON-PROBE: NOT DETECTED
RBC # BLD AUTO: 3.03 M/UL (ref 3.8–5.2)
RBC MORPH BLD: ABNORMAL
RESISTANT GENE TARGETS: ABNORMAL
S AUREUS DNA BLD POS QL NAA+NON-PROBE: NOT DETECTED
S AUREUS+CONS DNA BLD POS NAA+NON-PROBE: DETECTED
S EPIDERMIDIS DNA BLD POS QL NAA+NON-PRB: DETECTED
S LUGDUNENSIS DNA BLD POS QL NAA+NON-PRB: NOT DETECTED
S MALTOPHILIA DNA BLD POS QL NAA+NON-PRB: NOT DETECTED
S MARCESCENS DNA BLD POS NAA+NON-PROBE: NOT DETECTED
S PNEUM DNA BLD POS QL NAA+NON-PROBE: NOT DETECTED
S PYO DNA BLD POS QL NAA+NON-PROBE: NOT DETECTED
SALMONELLA DNA BLD POS QL NAA+NON-PROBE: NOT DETECTED
SERVICE CMNT-IMP: ABNORMAL
SERVICE CMNT-IMP: NORMAL
SERVICE CMNT-IMP: NORMAL
SODIUM SERPL-SCNC: 136 MMOL/L (ref 136–145)
STREPTOCOCCUS DNA BLD POS NAA+NON-PROBE: NOT DETECTED
WBC # BLD AUTO: 11.9 K/UL (ref 3.6–11)

## 2023-11-02 PROCEDURE — 36415 COLL VENOUS BLD VENIPUNCTURE: CPT

## 2023-11-02 PROCEDURE — 2060000000 HC ICU INTERMEDIATE R&B

## 2023-11-02 PROCEDURE — C9113 INJ PANTOPRAZOLE SODIUM, VIA: HCPCS | Performed by: FAMILY MEDICINE

## 2023-11-02 PROCEDURE — 92610 EVALUATE SWALLOWING FUNCTION: CPT

## 2023-11-02 PROCEDURE — 97530 THERAPEUTIC ACTIVITIES: CPT

## 2023-11-02 PROCEDURE — 6360000002 HC RX W HCPCS: Performed by: PHYSICIAN ASSISTANT

## 2023-11-02 PROCEDURE — 6370000000 HC RX 637 (ALT 250 FOR IP): Performed by: FAMILY MEDICINE

## 2023-11-02 PROCEDURE — 97161 PT EVAL LOW COMPLEX 20 MIN: CPT

## 2023-11-02 PROCEDURE — A4216 STERILE WATER/SALINE, 10 ML: HCPCS | Performed by: FAMILY MEDICINE

## 2023-11-02 PROCEDURE — 80048 BASIC METABOLIC PNL TOTAL CA: CPT

## 2023-11-02 PROCEDURE — 6360000002 HC RX W HCPCS: Performed by: HOSPITALIST

## 2023-11-02 PROCEDURE — 85025 COMPLETE CBC W/AUTO DIFF WBC: CPT

## 2023-11-02 PROCEDURE — 6370000000 HC RX 637 (ALT 250 FOR IP): Performed by: PHYSICIAN ASSISTANT

## 2023-11-02 PROCEDURE — 2580000003 HC RX 258: Performed by: FAMILY MEDICINE

## 2023-11-02 PROCEDURE — 2580000003 HC RX 258: Performed by: HOSPITALIST

## 2023-11-02 PROCEDURE — 97165 OT EVAL LOW COMPLEX 30 MIN: CPT

## 2023-11-02 PROCEDURE — 82962 GLUCOSE BLOOD TEST: CPT

## 2023-11-02 PROCEDURE — 6360000002 HC RX W HCPCS: Performed by: FAMILY MEDICINE

## 2023-11-02 PROCEDURE — 94640 AIRWAY INHALATION TREATMENT: CPT

## 2023-11-02 PROCEDURE — 2580000003 HC RX 258: Performed by: PHYSICIAN ASSISTANT

## 2023-11-02 RX ORDER — PANTOPRAZOLE SODIUM 40 MG/1
40 TABLET, DELAYED RELEASE ORAL
Status: DISCONTINUED | OUTPATIENT
Start: 2023-11-03 | End: 2023-11-08 | Stop reason: HOSPADM

## 2023-11-02 RX ADMIN — VANCOMYCIN HYDROCHLORIDE 1000 MG: 1 INJECTION, POWDER, LYOPHILIZED, FOR SOLUTION INTRAVENOUS at 09:24

## 2023-11-02 RX ADMIN — PIPERACILLIN AND TAZOBACTAM 4500 MG: 4; .5 INJECTION, POWDER, LYOPHILIZED, FOR SOLUTION INTRAVENOUS at 13:10

## 2023-11-02 RX ADMIN — PANTOPRAZOLE SODIUM 40 MG: 40 INJECTION, POWDER, FOR SOLUTION INTRAVENOUS at 09:22

## 2023-11-02 RX ADMIN — CEFEPIME 2000 MG: 2 INJECTION, POWDER, FOR SOLUTION INTRAVENOUS at 06:56

## 2023-11-02 RX ADMIN — FLUOXETINE 20 MG: 20 CAPSULE ORAL at 09:23

## 2023-11-02 RX ADMIN — CETIRIZINE HYDROCHLORIDE 10 MG: 10 TABLET, FILM COATED ORAL at 09:24

## 2023-11-02 RX ADMIN — IPRATROPIUM BROMIDE 0.5 MG: 0.5 SOLUTION RESPIRATORY (INHALATION) at 07:45

## 2023-11-02 RX ADMIN — LISINOPRIL 10 MG: 10 TABLET ORAL at 09:23

## 2023-11-02 RX ADMIN — SODIUM CHLORIDE, PRESERVATIVE FREE 10 ML: 5 INJECTION INTRAVENOUS at 09:41

## 2023-11-02 RX ADMIN — IPRATROPIUM BROMIDE 0.5 MG: 0.5 SOLUTION RESPIRATORY (INHALATION) at 19:22

## 2023-11-02 RX ADMIN — Medication 1 CAPSULE: at 07:18

## 2023-11-02 RX ADMIN — ARFORMOTEROL TARTRATE: 15 SOLUTION RESPIRATORY (INHALATION) at 07:45

## 2023-11-02 RX ADMIN — AMLODIPINE BESYLATE 2.5 MG: 5 TABLET ORAL at 09:23

## 2023-11-02 RX ADMIN — ENOXAPARIN SODIUM 40 MG: 100 INJECTION SUBCUTANEOUS at 09:22

## 2023-11-02 RX ADMIN — IPRATROPIUM BROMIDE 0.5 MG: 0.5 SOLUTION RESPIRATORY (INHALATION) at 15:20

## 2023-11-02 RX ADMIN — IPRATROPIUM BROMIDE 0.5 MG: 0.5 SOLUTION RESPIRATORY (INHALATION) at 11:38

## 2023-11-02 RX ADMIN — ARFORMOTEROL TARTRATE: 15 SOLUTION RESPIRATORY (INHALATION) at 19:22

## 2023-11-02 RX ADMIN — PIPERACILLIN AND TAZOBACTAM 3375 MG: 3; .375 INJECTION, POWDER, LYOPHILIZED, FOR SOLUTION INTRAVENOUS at 18:49

## 2023-11-02 RX ADMIN — SODIUM CHLORIDE, PRESERVATIVE FREE 5 ML: 5 INJECTION INTRAVENOUS at 21:51

## 2023-11-02 NOTE — CARE COORDINATION
Care Management Initial Assessment       RUR: 16% Moderate  Readmission? No  1st IM letter given? 11/1/23  1st  letter given: NA    Patient presented to the ED on 10/31/23 with fatigue, decreased appetite, and falls. Patient is being admitted for postobstructive pna. Patient lives with her daughter Elvi Valdivia but has six other children, all are local except one.          11/01/23 1217   Service Assessment   Patient Orientation Alert and Oriented;Person;Place;Situation;Self   Cognition Alert   History Provided By Child/Family   Accompanied By/Relationship Daughter Elvi Valdivia at the [address] [place] [address]  (7 children, all local except one.)   PCP Verified by CM Yes  (Dr. Saadia Cleveland)   Last Visit to PCP Within last 3 months   Prior Functional Level Assistance with the following:;Cooking;Mobility;Dressing   Current Functional Level Assistance with the following:;Dressing;Cooking;Mobility   Can patient return to prior living arrangement No  (Recommended for SNF)   Ability to make needs known: Fair   Family able to assist with home care needs: No   Financial Resources Medicare  (BCBS Supplement)   Community Resources None   Social/Functional History   Lives With Daughter  (Roommates)   Type of [address] [place] [address]  Two level  (Lives on first level.)   [address] - Number of Steps 4 steps   [address]; [address] Help From Family   ADL Assistance Needs assistance   [facility] Needs assistance   Ambulation Assistance Needs assistance   Transfer Assistance Independent   Active   --    Mode of Transportation  --    Discharge Planning   Type of [facility] [address] Other (Comment); Children  (Lives with her daughter Elvi Valdivia)   Current Services Prior To Admission Home Care  (Opened to [facility] for PT and home health aide)   [address]   DME Ordered?  No   Potential Assistance

## 2023-11-02 NOTE — CONSULTS
Comprehensive Nutrition Assessment    Type and Reason for Visit: Initial, Consult    Nutrition Recommendations/Plan:   Regular REKHA diet  Out of ensure. Ordered glucerna BID, protein rich snacks       Malnutrition Assessment:  Malnutrition Status:  Mild malnutrition (11/02/23 1519)    Context:  Acute Illness     Findings of the 6 clinical characteristics of malnutrition:  Energy Intake:  75% or less of estimated energy requirements for 7 or more days  Weight Loss:  7.5% over 3 months     Body Fat Loss:  No significant body fat loss     Muscle Mass Loss:  No significant muscle mass loss    Fluid Accumulation:  No significant fluid accumulation     Strength:  Not Performed       Nutrition Assessment:    PMHx: CHB s/p PPM, T2DM, HTN, HLD, depression    80 y.o. female admitted with pna. Pulmonology following; on abx. Seen by SLP today who cleared for regular diet/thin liquids. Visited with pt and daughter at bedside- history obtained from both. Pt endorsed poor appetite ongoing for weeks pta. Per daughter she was 185# at pulmonologist 10 days pta. Per EMR UBW ~200#. Wt loss for time frame mildly significant. Appears appropriately nourished for her age. Intake at [facility] poor. Had a few bites of grilled cheese for lunch (daughter brought in). Discussed use of small frequent meals dense in calories and protein, which foods contain protein, importance of continued po intake during times of poor appetite. Pt has had ensure before and likes chocolate. Agreed to pb crackers as well. Provided her with a menu so she can call for palatable meals. Liberalized diet to REKHA only. Other restrictions unwarranted at this time. Labs reviewed.     Nutritionally Significant Medications:  Culturelle, zofran, protonix, zosyn      Estimated Daily Nutrient Needs:  Energy Requirements Based On: Formula  Weight Used for Energy Requirements: Current  Energy (kcal/day): 1577 (MSJx1.2)  Weight Used for Protein Requirements: Current  Protein

## 2023-11-03 LAB
ANION GAP SERPL CALC-SCNC: 7 MMOL/L (ref 5–15)
BASOPHILS # BLD: 0.1 K/UL (ref 0–0.1)
BASOPHILS NFR BLD: 1 % (ref 0–1)
BUN SERPL-MCNC: 18 MG/DL (ref 6–20)
BUN/CREAT SERPL: 19 (ref 12–20)
CALCIUM SERPL-MCNC: 9 MG/DL (ref 8.5–10.1)
CHLORIDE SERPL-SCNC: 104 MMOL/L (ref 97–108)
CO2 SERPL-SCNC: 25 MMOL/L (ref 21–32)
CREAT SERPL-MCNC: 0.94 MG/DL (ref 0.55–1.02)
DIFFERENTIAL METHOD BLD: ABNORMAL
EOSINOPHIL # BLD: 0.2 K/UL (ref 0–0.4)
EOSINOPHIL NFR BLD: 2 % (ref 0–7)
ERYTHROCYTE [DISTWIDTH] IN BLOOD BY AUTOMATED COUNT: 12.6 % (ref 11.5–14.5)
GLUCOSE BLD STRIP.AUTO-MCNC: 113 MG/DL (ref 65–117)
GLUCOSE BLD STRIP.AUTO-MCNC: 114 MG/DL (ref 65–117)
GLUCOSE BLD STRIP.AUTO-MCNC: 127 MG/DL (ref 65–117)
GLUCOSE BLD STRIP.AUTO-MCNC: 152 MG/DL (ref 65–117)
GLUCOSE SERPL-MCNC: 110 MG/DL (ref 65–100)
HCT VFR BLD AUTO: 30.5 % (ref 35–47)
HGB BLD-MCNC: 9.4 G/DL (ref 11.5–16)
IMM GRANULOCYTES # BLD AUTO: 0.1 K/UL (ref 0–0.04)
IMM GRANULOCYTES NFR BLD AUTO: 1 % (ref 0–0.5)
LYMPHOCYTES # BLD: 0.8 K/UL (ref 0.8–3.5)
LYMPHOCYTES NFR BLD: 8 % (ref 12–49)
MAGNESIUM SERPL-MCNC: 2.2 MG/DL (ref 1.6–2.4)
MCH RBC QN AUTO: 29.3 PG (ref 26–34)
MCHC RBC AUTO-ENTMCNC: 30.8 G/DL (ref 30–36.5)
MCV RBC AUTO: 95 FL (ref 80–99)
MONOCYTES # BLD: 1.3 K/UL (ref 0–1)
MONOCYTES NFR BLD: 12 % (ref 5–13)
NEUTS SEG # BLD: 8 K/UL (ref 1.8–8)
NEUTS SEG NFR BLD: 76 % (ref 32–75)
NRBC # BLD: 0 K/UL (ref 0–0.01)
NRBC BLD-RTO: 0 PER 100 WBC
PHOSPHATE SERPL-MCNC: 3.5 MG/DL (ref 2.6–4.7)
PLATELET # BLD AUTO: 335 K/UL (ref 150–400)
PMV BLD AUTO: 10.4 FL (ref 8.9–12.9)
POTASSIUM SERPL-SCNC: 4.1 MMOL/L (ref 3.5–5.1)
RBC # BLD AUTO: 3.21 M/UL (ref 3.8–5.2)
RBC MORPH BLD: ABNORMAL
RBC MORPH BLD: ABNORMAL
SERVICE CMNT-IMP: ABNORMAL
SERVICE CMNT-IMP: ABNORMAL
SERVICE CMNT-IMP: NORMAL
SERVICE CMNT-IMP: NORMAL
SODIUM SERPL-SCNC: 136 MMOL/L (ref 136–145)
WBC # BLD AUTO: 10.5 K/UL (ref 3.6–11)

## 2023-11-03 PROCEDURE — 6360000002 HC RX W HCPCS: Performed by: FAMILY MEDICINE

## 2023-11-03 PROCEDURE — 6370000000 HC RX 637 (ALT 250 FOR IP): Performed by: STUDENT IN AN ORGANIZED HEALTH CARE EDUCATION/TRAINING PROGRAM

## 2023-11-03 PROCEDURE — 2700000000 HC OXYGEN THERAPY PER DAY

## 2023-11-03 PROCEDURE — 82962 GLUCOSE BLOOD TEST: CPT

## 2023-11-03 PROCEDURE — 6360000002 HC RX W HCPCS: Performed by: PHYSICIAN ASSISTANT

## 2023-11-03 PROCEDURE — 85025 COMPLETE CBC W/AUTO DIFF WBC: CPT

## 2023-11-03 PROCEDURE — 94760 N-INVAS EAR/PLS OXIMETRY 1: CPT

## 2023-11-03 PROCEDURE — 97530 THERAPEUTIC ACTIVITIES: CPT

## 2023-11-03 PROCEDURE — 80048 BASIC METABOLIC PNL TOTAL CA: CPT

## 2023-11-03 PROCEDURE — 6370000000 HC RX 637 (ALT 250 FOR IP): Performed by: PHYSICIAN ASSISTANT

## 2023-11-03 PROCEDURE — 36415 COLL VENOUS BLD VENIPUNCTURE: CPT

## 2023-11-03 PROCEDURE — 84100 ASSAY OF PHOSPHORUS: CPT

## 2023-11-03 PROCEDURE — 6360000002 HC RX W HCPCS: Performed by: HOSPITALIST

## 2023-11-03 PROCEDURE — 6370000000 HC RX 637 (ALT 250 FOR IP): Performed by: FAMILY MEDICINE

## 2023-11-03 PROCEDURE — 94640 AIRWAY INHALATION TREATMENT: CPT

## 2023-11-03 PROCEDURE — 2580000003 HC RX 258: Performed by: PHYSICIAN ASSISTANT

## 2023-11-03 PROCEDURE — 97535 SELF CARE MNGMENT TRAINING: CPT

## 2023-11-03 PROCEDURE — 83735 ASSAY OF MAGNESIUM: CPT

## 2023-11-03 PROCEDURE — 2580000003 HC RX 258: Performed by: FAMILY MEDICINE

## 2023-11-03 PROCEDURE — 1200000000 HC SEMI PRIVATE

## 2023-11-03 RX ADMIN — LISINOPRIL 10 MG: 10 TABLET ORAL at 09:16

## 2023-11-03 RX ADMIN — IPRATROPIUM BROMIDE 0.5 MG: 0.5 SOLUTION RESPIRATORY (INHALATION) at 12:25

## 2023-11-03 RX ADMIN — ENOXAPARIN SODIUM 40 MG: 100 INJECTION SUBCUTANEOUS at 09:16

## 2023-11-03 RX ADMIN — PIPERACILLIN AND TAZOBACTAM 3375 MG: 3; .375 INJECTION, POWDER, LYOPHILIZED, FOR SOLUTION INTRAVENOUS at 09:17

## 2023-11-03 RX ADMIN — ARFORMOTEROL TARTRATE: 15 SOLUTION RESPIRATORY (INHALATION) at 08:48

## 2023-11-03 RX ADMIN — PIPERACILLIN AND TAZOBACTAM 3375 MG: 3; .375 INJECTION, POWDER, LYOPHILIZED, FOR SOLUTION INTRAVENOUS at 18:24

## 2023-11-03 RX ADMIN — TRAMADOL HYDROCHLORIDE 50 MG: 50 TABLET ORAL at 22:00

## 2023-11-03 RX ADMIN — IPRATROPIUM BROMIDE 0.5 MG: 0.5 SOLUTION RESPIRATORY (INHALATION) at 08:48

## 2023-11-03 RX ADMIN — IPRATROPIUM BROMIDE 0.5 MG: 0.5 SOLUTION RESPIRATORY (INHALATION) at 14:45

## 2023-11-03 RX ADMIN — Medication 1 CAPSULE: at 07:18

## 2023-11-03 RX ADMIN — CETIRIZINE HYDROCHLORIDE 10 MG: 10 TABLET, FILM COATED ORAL at 09:16

## 2023-11-03 RX ADMIN — AMLODIPINE BESYLATE 2.5 MG: 5 TABLET ORAL at 09:16

## 2023-11-03 RX ADMIN — SODIUM CHLORIDE, PRESERVATIVE FREE 10 ML: 5 INJECTION INTRAVENOUS at 09:17

## 2023-11-03 RX ADMIN — PIPERACILLIN AND TAZOBACTAM 3375 MG: 3; .375 INJECTION, POWDER, LYOPHILIZED, FOR SOLUTION INTRAVENOUS at 03:50

## 2023-11-03 RX ADMIN — ARFORMOTEROL TARTRATE: 15 SOLUTION RESPIRATORY (INHALATION) at 20:07

## 2023-11-03 RX ADMIN — SODIUM CHLORIDE, PRESERVATIVE FREE 10 ML: 5 INJECTION INTRAVENOUS at 20:15

## 2023-11-03 RX ADMIN — PANTOPRAZOLE SODIUM 40 MG: 40 TABLET, DELAYED RELEASE ORAL at 07:18

## 2023-11-03 RX ADMIN — IPRATROPIUM BROMIDE 0.5 MG: 0.5 SOLUTION RESPIRATORY (INHALATION) at 20:07

## 2023-11-03 RX ADMIN — FLUOXETINE 20 MG: 20 CAPSULE ORAL at 09:16

## 2023-11-03 ASSESSMENT — PAIN DESCRIPTION - LOCATION: LOCATION: BACK

## 2023-11-03 ASSESSMENT — PAIN SCALES - WONG BAKER: WONGBAKER_NUMERICALRESPONSE: 2

## 2023-11-03 ASSESSMENT — PAIN SCALES - GENERAL: PAINLEVEL_OUTOF10: 6

## 2023-11-03 ASSESSMENT — PAIN DESCRIPTION - DESCRIPTORS: DESCRIPTORS: ACHING;SORE

## 2023-11-03 ASSESSMENT — PAIN DESCRIPTION - ORIENTATION: ORIENTATION: LOWER

## 2023-11-03 NOTE — CARE COORDINATION
Transition of Care Plan:    RUR: 16% Moderate  Prior Level of Functioning: Lives with daughter Zara Vasques who was assisting with cooking, mobility , and dressing. Disposition: SNF  If SNF or IPR: Date FOC offered: 11/2/23  Date [address] received: 11/2/23  Accepting facility: [facility] and [address]  Follow up appointments: PCP, Specialist  DME needed: Owns cane, rollator, and wheelchair  Transportation at discharge: TBD  IM/IMM Medicare/ letter given: 11/1/23  Caregiver Contact: Ambrocio De Souza, [phone]  Discharge Caregiver contacted prior to discharge? NA  Care Conference needed? NA  Barriers to discharge: NA    CM updated in IDRs patient would be ready for DC today to SNF if she does not want to go forward with bone scan. CM later updated by nurse patient is planned for bone scan on Monday. CM placed call to patient's daughter Zara Vasques to inform that [facility] and [address] have accepted patient. Zara Vasques would like to tour the facilities and follow up with CM with choice. Additional referrals pending are with Samuel Fowler, [address] [facility], [place], [facility], [place] and [place] does not want [facility] to monitor.         [address] [facility], [facility] [address]

## 2023-11-04 LAB
ANION GAP SERPL CALC-SCNC: 8 MMOL/L (ref 5–15)
BASOPHILS # BLD: 0.1 K/UL (ref 0–0.1)
BASOPHILS NFR BLD: 1 % (ref 0–1)
BUN SERPL-MCNC: 14 MG/DL (ref 6–20)
BUN/CREAT SERPL: 16 (ref 12–20)
CALCIUM SERPL-MCNC: 9.6 MG/DL (ref 8.5–10.1)
CHLORIDE SERPL-SCNC: 103 MMOL/L (ref 97–108)
CO2 SERPL-SCNC: 25 MMOL/L (ref 21–32)
CREAT SERPL-MCNC: 0.86 MG/DL (ref 0.55–1.02)
DIFFERENTIAL METHOD BLD: ABNORMAL
EOSINOPHIL # BLD: 0.3 K/UL (ref 0–0.4)
EOSINOPHIL NFR BLD: 2 % (ref 0–7)
ERYTHROCYTE [DISTWIDTH] IN BLOOD BY AUTOMATED COUNT: 12.7 % (ref 11.5–14.5)
GLUCOSE BLD STRIP.AUTO-MCNC: 105 MG/DL (ref 65–117)
GLUCOSE BLD STRIP.AUTO-MCNC: 105 MG/DL (ref 65–117)
GLUCOSE BLD STRIP.AUTO-MCNC: 126 MG/DL (ref 65–117)
GLUCOSE BLD STRIP.AUTO-MCNC: 146 MG/DL (ref 65–117)
GLUCOSE SERPL-MCNC: 98 MG/DL (ref 65–100)
HCT VFR BLD AUTO: 30.7 % (ref 35–47)
HGB BLD-MCNC: 9.7 G/DL (ref 11.5–16)
IMM GRANULOCYTES # BLD AUTO: 0.1 K/UL (ref 0–0.04)
IMM GRANULOCYTES NFR BLD AUTO: 1 % (ref 0–0.5)
LYMPHOCYTES # BLD: 0.9 K/UL (ref 0.8–3.5)
LYMPHOCYTES NFR BLD: 9 % (ref 12–49)
MCH RBC QN AUTO: 29.6 PG (ref 26–34)
MCHC RBC AUTO-ENTMCNC: 31.6 G/DL (ref 30–36.5)
MCV RBC AUTO: 93.6 FL (ref 80–99)
MONOCYTES # BLD: 1.1 K/UL (ref 0–1)
MONOCYTES NFR BLD: 11 % (ref 5–13)
NEUTS SEG # BLD: 8 K/UL (ref 1.8–8)
NEUTS SEG NFR BLD: 76 % (ref 32–75)
NRBC # BLD: 0 K/UL (ref 0–0.01)
NRBC BLD-RTO: 0 PER 100 WBC
PLATELET # BLD AUTO: 331 K/UL (ref 150–400)
PMV BLD AUTO: 10.1 FL (ref 8.9–12.9)
POTASSIUM SERPL-SCNC: 4 MMOL/L (ref 3.5–5.1)
RBC # BLD AUTO: 3.28 M/UL (ref 3.8–5.2)
SERVICE CMNT-IMP: ABNORMAL
SERVICE CMNT-IMP: ABNORMAL
SERVICE CMNT-IMP: NORMAL
SERVICE CMNT-IMP: NORMAL
SODIUM SERPL-SCNC: 136 MMOL/L (ref 136–145)
WBC # BLD AUTO: 10.5 K/UL (ref 3.6–11)

## 2023-11-04 PROCEDURE — 6360000002 HC RX W HCPCS: Performed by: HOSPITALIST

## 2023-11-04 PROCEDURE — 6370000000 HC RX 637 (ALT 250 FOR IP): Performed by: PHYSICIAN ASSISTANT

## 2023-11-04 PROCEDURE — 82962 GLUCOSE BLOOD TEST: CPT

## 2023-11-04 PROCEDURE — 6370000000 HC RX 637 (ALT 250 FOR IP): Performed by: FAMILY MEDICINE

## 2023-11-04 PROCEDURE — 94640 AIRWAY INHALATION TREATMENT: CPT

## 2023-11-04 PROCEDURE — 6360000002 HC RX W HCPCS: Performed by: FAMILY MEDICINE

## 2023-11-04 PROCEDURE — 94760 N-INVAS EAR/PLS OXIMETRY 1: CPT

## 2023-11-04 PROCEDURE — 1200000000 HC SEMI PRIVATE

## 2023-11-04 PROCEDURE — 2700000000 HC OXYGEN THERAPY PER DAY

## 2023-11-04 PROCEDURE — 6370000000 HC RX 637 (ALT 250 FOR IP): Performed by: STUDENT IN AN ORGANIZED HEALTH CARE EDUCATION/TRAINING PROGRAM

## 2023-11-04 PROCEDURE — 6360000002 HC RX W HCPCS: Performed by: STUDENT IN AN ORGANIZED HEALTH CARE EDUCATION/TRAINING PROGRAM

## 2023-11-04 PROCEDURE — 6360000002 HC RX W HCPCS: Performed by: PHYSICIAN ASSISTANT

## 2023-11-04 PROCEDURE — 36415 COLL VENOUS BLD VENIPUNCTURE: CPT

## 2023-11-04 PROCEDURE — 80048 BASIC METABOLIC PNL TOTAL CA: CPT

## 2023-11-04 PROCEDURE — 85025 COMPLETE CBC W/AUTO DIFF WBC: CPT

## 2023-11-04 PROCEDURE — 2580000003 HC RX 258: Performed by: FAMILY MEDICINE

## 2023-11-04 PROCEDURE — 2580000003 HC RX 258: Performed by: PHYSICIAN ASSISTANT

## 2023-11-04 RX ORDER — BUDESONIDE 0.5 MG/2ML
0.5 INHALANT ORAL
Status: DISCONTINUED | OUTPATIENT
Start: 2023-11-04 | End: 2023-11-08 | Stop reason: HOSPADM

## 2023-11-04 RX ORDER — ALBUTEROL SULFATE 2.5 MG/3ML
2.5 SOLUTION RESPIRATORY (INHALATION) EVERY 6 HOURS PRN
Status: DISCONTINUED | OUTPATIENT
Start: 2023-11-04 | End: 2023-11-08 | Stop reason: HOSPADM

## 2023-11-04 RX ORDER — ARFORMOTEROL TARTRATE 15 UG/2ML
15 SOLUTION RESPIRATORY (INHALATION)
Status: DISCONTINUED | OUTPATIENT
Start: 2023-11-04 | End: 2023-11-08 | Stop reason: HOSPADM

## 2023-11-04 RX ADMIN — PIPERACILLIN AND TAZOBACTAM 3375 MG: 3; .375 INJECTION, POWDER, LYOPHILIZED, FOR SOLUTION INTRAVENOUS at 02:31

## 2023-11-04 RX ADMIN — AMLODIPINE BESYLATE 2.5 MG: 5 TABLET ORAL at 09:24

## 2023-11-04 RX ADMIN — PIPERACILLIN AND TAZOBACTAM 3375 MG: 3; .375 INJECTION, POWDER, LYOPHILIZED, FOR SOLUTION INTRAVENOUS at 18:15

## 2023-11-04 RX ADMIN — ENOXAPARIN SODIUM 40 MG: 100 INJECTION SUBCUTANEOUS at 09:24

## 2023-11-04 RX ADMIN — FLUOXETINE 20 MG: 20 CAPSULE ORAL at 09:24

## 2023-11-04 RX ADMIN — Medication 1 CAPSULE: at 09:24

## 2023-11-04 RX ADMIN — SODIUM CHLORIDE, PRESERVATIVE FREE 10 ML: 5 INJECTION INTRAVENOUS at 09:29

## 2023-11-04 RX ADMIN — PIPERACILLIN AND TAZOBACTAM 3375 MG: 3; .375 INJECTION, POWDER, LYOPHILIZED, FOR SOLUTION INTRAVENOUS at 09:26

## 2023-11-04 RX ADMIN — ARFORMOTEROL TARTRATE 15 MCG: 15 SOLUTION RESPIRATORY (INHALATION) at 20:41

## 2023-11-04 RX ADMIN — IPRATROPIUM BROMIDE 0.5 MG: 0.5 SOLUTION RESPIRATORY (INHALATION) at 20:41

## 2023-11-04 RX ADMIN — ARFORMOTEROL TARTRATE: 15 SOLUTION RESPIRATORY (INHALATION) at 08:02

## 2023-11-04 RX ADMIN — IPRATROPIUM BROMIDE 0.5 MG: 0.5 SOLUTION RESPIRATORY (INHALATION) at 08:02

## 2023-11-04 RX ADMIN — CETIRIZINE HYDROCHLORIDE 10 MG: 10 TABLET, FILM COATED ORAL at 09:24

## 2023-11-04 RX ADMIN — PANTOPRAZOLE SODIUM 40 MG: 40 TABLET, DELAYED RELEASE ORAL at 09:32

## 2023-11-04 RX ADMIN — BUDESONIDE 500 MCG: 0.5 INHALANT RESPIRATORY (INHALATION) at 20:41

## 2023-11-04 RX ADMIN — TRAMADOL HYDROCHLORIDE 50 MG: 50 TABLET ORAL at 21:23

## 2023-11-04 RX ADMIN — SODIUM CHLORIDE, PRESERVATIVE FREE 10 ML: 5 INJECTION INTRAVENOUS at 20:41

## 2023-11-04 RX ADMIN — LISINOPRIL 10 MG: 10 TABLET ORAL at 09:24

## 2023-11-04 ASSESSMENT — PAIN DESCRIPTION - ORIENTATION: ORIENTATION: POSTERIOR

## 2023-11-04 ASSESSMENT — PAIN DESCRIPTION - DESCRIPTORS: DESCRIPTORS: ACHING;SORE

## 2023-11-04 ASSESSMENT — PAIN SCALES - WONG BAKER: WONGBAKER_NUMERICALRESPONSE: 0

## 2023-11-04 ASSESSMENT — PAIN SCALES - GENERAL: PAINLEVEL_OUTOF10: 6

## 2023-11-04 ASSESSMENT — PAIN DESCRIPTION - LOCATION: LOCATION: BACK

## 2023-11-05 LAB
BACTERIA SPEC CULT: NORMAL
GLUCOSE BLD STRIP.AUTO-MCNC: 108 MG/DL (ref 65–117)
GLUCOSE BLD STRIP.AUTO-MCNC: 113 MG/DL (ref 65–117)
GLUCOSE BLD STRIP.AUTO-MCNC: 160 MG/DL (ref 65–117)
GLUCOSE BLD STRIP.AUTO-MCNC: 89 MG/DL (ref 65–117)
SERVICE CMNT-IMP: ABNORMAL
SERVICE CMNT-IMP: NORMAL

## 2023-11-05 PROCEDURE — 6360000002 HC RX W HCPCS: Performed by: STUDENT IN AN ORGANIZED HEALTH CARE EDUCATION/TRAINING PROGRAM

## 2023-11-05 PROCEDURE — 6360000002 HC RX W HCPCS: Performed by: HOSPITALIST

## 2023-11-05 PROCEDURE — 94640 AIRWAY INHALATION TREATMENT: CPT

## 2023-11-05 PROCEDURE — 6370000000 HC RX 637 (ALT 250 FOR IP): Performed by: FAMILY MEDICINE

## 2023-11-05 PROCEDURE — 82962 GLUCOSE BLOOD TEST: CPT

## 2023-11-05 PROCEDURE — 6360000002 HC RX W HCPCS: Performed by: PHYSICIAN ASSISTANT

## 2023-11-05 PROCEDURE — 2700000000 HC OXYGEN THERAPY PER DAY

## 2023-11-05 PROCEDURE — 2580000003 HC RX 258: Performed by: PHYSICIAN ASSISTANT

## 2023-11-05 PROCEDURE — 1200000000 HC SEMI PRIVATE

## 2023-11-05 PROCEDURE — 2580000003 HC RX 258: Performed by: FAMILY MEDICINE

## 2023-11-05 PROCEDURE — 6360000002 HC RX W HCPCS: Performed by: FAMILY MEDICINE

## 2023-11-05 PROCEDURE — 6370000000 HC RX 637 (ALT 250 FOR IP): Performed by: STUDENT IN AN ORGANIZED HEALTH CARE EDUCATION/TRAINING PROGRAM

## 2023-11-05 PROCEDURE — 6370000000 HC RX 637 (ALT 250 FOR IP): Performed by: PHYSICIAN ASSISTANT

## 2023-11-05 PROCEDURE — 94760 N-INVAS EAR/PLS OXIMETRY 1: CPT

## 2023-11-05 RX ADMIN — BUDESONIDE 500 MCG: 0.5 INHALANT RESPIRATORY (INHALATION) at 08:42

## 2023-11-05 RX ADMIN — SODIUM CHLORIDE, PRESERVATIVE FREE 10 ML: 5 INJECTION INTRAVENOUS at 10:23

## 2023-11-05 RX ADMIN — IPRATROPIUM BROMIDE 0.5 MG: 0.5 SOLUTION RESPIRATORY (INHALATION) at 08:42

## 2023-11-05 RX ADMIN — ENOXAPARIN SODIUM 40 MG: 100 INJECTION SUBCUTANEOUS at 09:37

## 2023-11-05 RX ADMIN — CETIRIZINE HYDROCHLORIDE 10 MG: 10 TABLET, FILM COATED ORAL at 09:36

## 2023-11-05 RX ADMIN — PIPERACILLIN AND TAZOBACTAM 3375 MG: 3; .375 INJECTION, POWDER, LYOPHILIZED, FOR SOLUTION INTRAVENOUS at 18:41

## 2023-11-05 RX ADMIN — PIPERACILLIN AND TAZOBACTAM 3375 MG: 3; .375 INJECTION, POWDER, LYOPHILIZED, FOR SOLUTION INTRAVENOUS at 09:37

## 2023-11-05 RX ADMIN — BUDESONIDE 500 MCG: 0.5 INHALANT RESPIRATORY (INHALATION) at 23:02

## 2023-11-05 RX ADMIN — Medication 1 CAPSULE: at 09:36

## 2023-11-05 RX ADMIN — PANTOPRAZOLE SODIUM 40 MG: 40 TABLET, DELAYED RELEASE ORAL at 07:21

## 2023-11-05 RX ADMIN — IPRATROPIUM BROMIDE 0.5 MG: 0.5 SOLUTION RESPIRATORY (INHALATION) at 23:02

## 2023-11-05 RX ADMIN — LISINOPRIL 10 MG: 10 TABLET ORAL at 09:36

## 2023-11-05 RX ADMIN — ARFORMOTEROL TARTRATE 15 MCG: 15 SOLUTION RESPIRATORY (INHALATION) at 08:42

## 2023-11-05 RX ADMIN — PIPERACILLIN AND TAZOBACTAM 3375 MG: 3; .375 INJECTION, POWDER, LYOPHILIZED, FOR SOLUTION INTRAVENOUS at 02:30

## 2023-11-05 RX ADMIN — ARFORMOTEROL TARTRATE 15 MCG: 15 SOLUTION RESPIRATORY (INHALATION) at 23:02

## 2023-11-05 RX ADMIN — AMLODIPINE BESYLATE 2.5 MG: 5 TABLET ORAL at 09:36

## 2023-11-05 RX ADMIN — FLUOXETINE 20 MG: 20 CAPSULE ORAL at 09:36

## 2023-11-05 ASSESSMENT — PAIN SCALES - GENERAL
PAINLEVEL_OUTOF10: 0
PAINLEVEL_OUTOF10: 0
PAINLEVEL_OUTOF10: 4

## 2023-11-06 ENCOUNTER — APPOINTMENT (OUTPATIENT)
Facility: HOSPITAL | Age: 88
DRG: 982 | End: 2023-11-06
Payer: MEDICARE

## 2023-11-06 LAB
ANION GAP SERPL CALC-SCNC: 7 MMOL/L (ref 5–15)
APPEARANCE UR: ABNORMAL
BACTERIA URNS QL MICRO: NEGATIVE /HPF
BASOPHILS # BLD: 0 K/UL (ref 0–0.1)
BASOPHILS NFR BLD: 0 % (ref 0–1)
BILIRUB UR QL: NEGATIVE
BUN SERPL-MCNC: 11 MG/DL (ref 6–20)
BUN/CREAT SERPL: 14 (ref 12–20)
CALCIUM SERPL-MCNC: 9.2 MG/DL (ref 8.5–10.1)
CAOX CRY URNS QL MICRO: ABNORMAL
CHLORIDE SERPL-SCNC: 103 MMOL/L (ref 97–108)
CO2 SERPL-SCNC: 27 MMOL/L (ref 21–32)
COLOR UR: ABNORMAL
CREAT SERPL-MCNC: 0.78 MG/DL (ref 0.55–1.02)
DIFFERENTIAL METHOD BLD: ABNORMAL
EOSINOPHIL # BLD: 0.1 K/UL (ref 0–0.4)
EOSINOPHIL NFR BLD: 2 % (ref 0–7)
EPITH CASTS URNS QL MICRO: ABNORMAL /LPF
ERYTHROCYTE [DISTWIDTH] IN BLOOD BY AUTOMATED COUNT: 12.6 % (ref 11.5–14.5)
GLUCOSE BLD STRIP.AUTO-MCNC: 124 MG/DL (ref 65–117)
GLUCOSE BLD STRIP.AUTO-MCNC: 133 MG/DL (ref 65–117)
GLUCOSE BLD STRIP.AUTO-MCNC: 137 MG/DL (ref 65–117)
GLUCOSE SERPL-MCNC: 103 MG/DL (ref 65–100)
GLUCOSE UR STRIP.AUTO-MCNC: NEGATIVE MG/DL
HCT VFR BLD AUTO: 29 % (ref 35–47)
HGB BLD-MCNC: 9.1 G/DL (ref 11.5–16)
HGB UR QL STRIP: NEGATIVE
IMM GRANULOCYTES # BLD AUTO: 0.1 K/UL (ref 0–0.04)
IMM GRANULOCYTES NFR BLD AUTO: 2 % (ref 0–0.5)
KETONES UR QL STRIP.AUTO: NEGATIVE MG/DL
LEUKOCYTE ESTERASE UR QL STRIP.AUTO: ABNORMAL
LYMPHOCYTES # BLD: 0.5 K/UL (ref 0.8–3.5)
LYMPHOCYTES NFR BLD: 7 % (ref 12–49)
MCH RBC QN AUTO: 29.4 PG (ref 26–34)
MCHC RBC AUTO-ENTMCNC: 31.4 G/DL (ref 30–36.5)
MCV RBC AUTO: 93.5 FL (ref 80–99)
MONOCYTES # BLD: 1.1 K/UL (ref 0–1)
MONOCYTES NFR BLD: 15 % (ref 5–13)
NEUTS SEG # BLD: 5.4 K/UL (ref 1.8–8)
NEUTS SEG NFR BLD: 74 % (ref 32–75)
NITRITE UR QL STRIP.AUTO: NEGATIVE
NRBC # BLD: 0 K/UL (ref 0–0.01)
NRBC BLD-RTO: 0 PER 100 WBC
PH UR STRIP: 6 (ref 5–8)
PLATELET # BLD AUTO: 274 K/UL (ref 150–400)
PLATELET COMMENT: ABNORMAL
PMV BLD AUTO: 10 FL (ref 8.9–12.9)
POTASSIUM SERPL-SCNC: 4.1 MMOL/L (ref 3.5–5.1)
PROT UR STRIP-MCNC: ABNORMAL MG/DL
RBC # BLD AUTO: 3.1 M/UL (ref 3.8–5.2)
RBC #/AREA URNS HPF: ABNORMAL /HPF (ref 0–5)
RBC MORPH BLD: ABNORMAL
SERVICE CMNT-IMP: ABNORMAL
SODIUM SERPL-SCNC: 137 MMOL/L (ref 136–145)
SP GR UR REFRACTOMETRY: 1.02 (ref 1–1.03)
URINE CULTURE IF INDICATED: ABNORMAL
UROBILINOGEN UR QL STRIP.AUTO: 0.2 EU/DL (ref 0.2–1)
WBC # BLD AUTO: 7.2 K/UL (ref 3.6–11)
WBC URNS QL MICRO: ABNORMAL /HPF (ref 0–4)
YEAST BUDDING URNS QL: PRESENT
YEAST URNS QL MICRO: PRESENT

## 2023-11-06 PROCEDURE — 36415 COLL VENOUS BLD VENIPUNCTURE: CPT

## 2023-11-06 PROCEDURE — 6370000000 HC RX 637 (ALT 250 FOR IP): Performed by: FAMILY MEDICINE

## 2023-11-06 PROCEDURE — 6360000002 HC RX W HCPCS: Performed by: PHYSICIAN ASSISTANT

## 2023-11-06 PROCEDURE — 6360000002 HC RX W HCPCS: Performed by: HOSPITALIST

## 2023-11-06 PROCEDURE — 6370000000 HC RX 637 (ALT 250 FOR IP): Performed by: PHYSICIAN ASSISTANT

## 2023-11-06 PROCEDURE — 80048 BASIC METABOLIC PNL TOTAL CA: CPT

## 2023-11-06 PROCEDURE — 2700000000 HC OXYGEN THERAPY PER DAY

## 2023-11-06 PROCEDURE — 2580000003 HC RX 258: Performed by: FAMILY MEDICINE

## 2023-11-06 PROCEDURE — 94640 AIRWAY INHALATION TREATMENT: CPT

## 2023-11-06 PROCEDURE — 81001 URINALYSIS AUTO W/SCOPE: CPT

## 2023-11-06 PROCEDURE — 94760 N-INVAS EAR/PLS OXIMETRY 1: CPT

## 2023-11-06 PROCEDURE — 1200000000 HC SEMI PRIVATE

## 2023-11-06 PROCEDURE — 82962 GLUCOSE BLOOD TEST: CPT

## 2023-11-06 PROCEDURE — 2580000003 HC RX 258: Performed by: PHYSICIAN ASSISTANT

## 2023-11-06 PROCEDURE — 3430000000 HC RX DIAGNOSTIC RADIOPHARMACEUTICAL: Performed by: STUDENT IN AN ORGANIZED HEALTH CARE EDUCATION/TRAINING PROGRAM

## 2023-11-06 PROCEDURE — 6370000000 HC RX 637 (ALT 250 FOR IP): Performed by: STUDENT IN AN ORGANIZED HEALTH CARE EDUCATION/TRAINING PROGRAM

## 2023-11-06 PROCEDURE — 78300 BONE IMAGING LIMITED AREA: CPT

## 2023-11-06 PROCEDURE — A9503 TC99M MEDRONATE: HCPCS | Performed by: STUDENT IN AN ORGANIZED HEALTH CARE EDUCATION/TRAINING PROGRAM

## 2023-11-06 PROCEDURE — 6360000002 HC RX W HCPCS: Performed by: STUDENT IN AN ORGANIZED HEALTH CARE EDUCATION/TRAINING PROGRAM

## 2023-11-06 PROCEDURE — 85025 COMPLETE CBC W/AUTO DIFF WBC: CPT

## 2023-11-06 RX ORDER — BENAZEPRIL HYDROCHLORIDE 10 MG/1
TABLET ORAL
Qty: 90 TABLET | Refills: 1 | Status: SHIPPED | OUTPATIENT
Start: 2023-11-06

## 2023-11-06 RX ORDER — TC 99M MEDRONATE 20 MG/10ML
22 INJECTION, POWDER, LYOPHILIZED, FOR SOLUTION INTRAVENOUS
Status: COMPLETED | OUTPATIENT
Start: 2023-11-06 | End: 2023-11-06

## 2023-11-06 RX ORDER — AMLODIPINE BESYLATE 2.5 MG/1
TABLET ORAL
Qty: 90 TABLET | Refills: 1 | OUTPATIENT
Start: 2023-11-06

## 2023-11-06 RX ADMIN — LISINOPRIL 10 MG: 10 TABLET ORAL at 08:47

## 2023-11-06 RX ADMIN — AMLODIPINE BESYLATE 2.5 MG: 5 TABLET ORAL at 08:47

## 2023-11-06 RX ADMIN — FLUOXETINE 20 MG: 20 CAPSULE ORAL at 08:48

## 2023-11-06 RX ADMIN — CETIRIZINE HYDROCHLORIDE 10 MG: 10 TABLET, FILM COATED ORAL at 08:48

## 2023-11-06 RX ADMIN — ARFORMOTEROL TARTRATE 15 MCG: 15 SOLUTION RESPIRATORY (INHALATION) at 09:36

## 2023-11-06 RX ADMIN — TC 99M MEDRONATE 22 MILLICURIE: 20 INJECTION, POWDER, LYOPHILIZED, FOR SOLUTION INTRAVENOUS at 07:36

## 2023-11-06 RX ADMIN — SODIUM CHLORIDE, PRESERVATIVE FREE 10 ML: 5 INJECTION INTRAVENOUS at 21:22

## 2023-11-06 RX ADMIN — IPRATROPIUM BROMIDE 0.5 MG: 0.5 SOLUTION RESPIRATORY (INHALATION) at 09:41

## 2023-11-06 RX ADMIN — IPRATROPIUM BROMIDE 0.5 MG: 0.5 SOLUTION RESPIRATORY (INHALATION) at 20:32

## 2023-11-06 RX ADMIN — ARFORMOTEROL TARTRATE 15 MCG: 15 SOLUTION RESPIRATORY (INHALATION) at 20:32

## 2023-11-06 RX ADMIN — PIPERACILLIN AND TAZOBACTAM 3375 MG: 3; .375 INJECTION, POWDER, LYOPHILIZED, FOR SOLUTION INTRAVENOUS at 11:09

## 2023-11-06 RX ADMIN — TRAMADOL HYDROCHLORIDE 50 MG: 50 TABLET ORAL at 11:18

## 2023-11-06 RX ADMIN — PANTOPRAZOLE SODIUM 40 MG: 40 TABLET, DELAYED RELEASE ORAL at 07:40

## 2023-11-06 RX ADMIN — BUDESONIDE 500 MCG: 0.5 INHALANT RESPIRATORY (INHALATION) at 09:36

## 2023-11-06 RX ADMIN — PIPERACILLIN AND TAZOBACTAM 3375 MG: 3; .375 INJECTION, POWDER, LYOPHILIZED, FOR SOLUTION INTRAVENOUS at 04:04

## 2023-11-06 RX ADMIN — Medication 1 CAPSULE: at 07:40

## 2023-11-06 RX ADMIN — BUDESONIDE 500 MCG: 0.5 INHALANT RESPIRATORY (INHALATION) at 20:32

## 2023-11-06 ASSESSMENT — PAIN SCALES - GENERAL: PAINLEVEL_OUTOF10: 0

## 2023-11-06 NOTE — CARE COORDINATION
Medicaid LTSS Screening submitted for processing on the [facility] portal.      JIA Bennett

## 2023-11-06 NOTE — CARE COORDINATION
Transition of Care Plan:    RUR: 15%   Prior Level of Functioning: Min A \"Lives with her daughter who assist with cooking, mobility , and dressing. Disposition: SNF   If SNF or IPR: Date FOC offered: 11/2  Date FOC received: 11/2   Accepting facility:   [place] [address] [address]iyush Aquino [facility]   Follow up appointments: PCP   DME needed: None   Transportation at discharge: BLS   IM/IMM Medicare/ letter given: 2nd IMM Letter Needed   Caregiver Contact: Aurelia Nelson (Child)   [phone]   Discharge Caregiver contacted prior to discharge? N/A   Care Conference needed?  No   Barriers to discharge: Medical, Bone scan pending,

## 2023-11-07 ENCOUNTER — ANESTHESIA (OUTPATIENT)
Facility: HOSPITAL | Age: 88
DRG: 982 | End: 2023-11-07
Payer: MEDICARE

## 2023-11-07 ENCOUNTER — HOSPITAL ENCOUNTER (INPATIENT)
Facility: HOSPITAL | Age: 88
Discharge: HOME | DRG: 982 | End: 2023-11-10
Attending: FAMILY MEDICINE
Payer: MEDICARE

## 2023-11-07 ENCOUNTER — ANESTHESIA EVENT (OUTPATIENT)
Facility: HOSPITAL | Age: 88
DRG: 982 | End: 2023-11-07
Payer: MEDICARE

## 2023-11-07 VITALS
HEART RATE: 83 BPM | SYSTOLIC BLOOD PRESSURE: 117 MMHG | DIASTOLIC BLOOD PRESSURE: 61 MMHG | RESPIRATION RATE: 17 BRPM | TEMPERATURE: 98.3 F | OXYGEN SATURATION: 94 %

## 2023-11-07 LAB
GLUCOSE BLD STRIP.AUTO-MCNC: 101 MG/DL (ref 65–117)
GLUCOSE BLD STRIP.AUTO-MCNC: 102 MG/DL (ref 65–117)
GLUCOSE BLD STRIP.AUTO-MCNC: 98 MG/DL (ref 65–117)
GLUCOSE BLD STRIP.AUTO-MCNC: 99 MG/DL (ref 65–117)
GLUCOSE BLD STRIP.AUTO-MCNC: 99 MG/DL (ref 65–117)
SERVICE CMNT-IMP: NORMAL

## 2023-11-07 PROCEDURE — 6360000002 HC RX W HCPCS: Performed by: STUDENT IN AN ORGANIZED HEALTH CARE EDUCATION/TRAINING PROGRAM

## 2023-11-07 PROCEDURE — 0PS43ZZ REPOSITION THORACIC VERTEBRA, PERCUTANEOUS APPROACH: ICD-10-PCS | Performed by: STUDENT IN AN ORGANIZED HEALTH CARE EDUCATION/TRAINING PROGRAM

## 2023-11-07 PROCEDURE — 0PU43JZ SUPPLEMENT THORACIC VERTEBRA WITH SYNTHETIC SUBSTITUTE, PERCUTANEOUS APPROACH: ICD-10-PCS | Performed by: STUDENT IN AN ORGANIZED HEALTH CARE EDUCATION/TRAINING PROGRAM

## 2023-11-07 PROCEDURE — 2700000000 HC OXYGEN THERAPY PER DAY

## 2023-11-07 PROCEDURE — 2500000003 HC RX 250 WO HCPCS: Performed by: STUDENT IN AN ORGANIZED HEALTH CARE EDUCATION/TRAINING PROGRAM

## 2023-11-07 PROCEDURE — 1200000000 HC SEMI PRIVATE

## 2023-11-07 PROCEDURE — 99152 MOD SED SAME PHYS/QHP 5/>YRS: CPT

## 2023-11-07 PROCEDURE — 82962 GLUCOSE BLOOD TEST: CPT

## 2023-11-07 PROCEDURE — 2580000003 HC RX 258: Performed by: STUDENT IN AN ORGANIZED HEALTH CARE EDUCATION/TRAINING PROGRAM

## 2023-11-07 PROCEDURE — 2580000003 HC RX 258: Performed by: FAMILY MEDICINE

## 2023-11-07 PROCEDURE — 94640 AIRWAY INHALATION TREATMENT: CPT

## 2023-11-07 PROCEDURE — 6360000002 HC RX W HCPCS: Performed by: HOSPITALIST

## 2023-11-07 PROCEDURE — 94760 N-INVAS EAR/PLS OXIMETRY 1: CPT

## 2023-11-07 PROCEDURE — 6370000000 HC RX 637 (ALT 250 FOR IP): Performed by: STUDENT IN AN ORGANIZED HEALTH CARE EDUCATION/TRAINING PROGRAM

## 2023-11-07 RX ORDER — KETOROLAC TROMETHAMINE 30 MG/ML
15 INJECTION, SOLUTION INTRAMUSCULAR; INTRAVENOUS ONCE
Status: COMPLETED | OUTPATIENT
Start: 2023-11-07 | End: 2023-11-07

## 2023-11-07 RX ORDER — AMOXICILLIN AND CLAVULANATE POTASSIUM 875; 125 MG/1; MG/1
1 TABLET, FILM COATED ORAL EVERY 12 HOURS SCHEDULED
Status: DISCONTINUED | OUTPATIENT
Start: 2023-11-07 | End: 2023-11-08 | Stop reason: HOSPADM

## 2023-11-07 RX ORDER — LIDOCAINE HYDROCHLORIDE 10 MG/ML
INJECTION, SOLUTION EPIDURAL; INFILTRATION; INTRACAUDAL; PERINEURAL PRN
Status: COMPLETED | OUTPATIENT
Start: 2023-11-07 | End: 2023-11-07

## 2023-11-07 RX ORDER — DIPHENHYDRAMINE HYDROCHLORIDE 50 MG/ML
25 INJECTION INTRAMUSCULAR; INTRAVENOUS ONCE
Status: COMPLETED | OUTPATIENT
Start: 2023-11-07 | End: 2023-11-07

## 2023-11-07 RX ORDER — MIDAZOLAM HYDROCHLORIDE 2 MG/2ML
INJECTION, SOLUTION INTRAMUSCULAR; INTRAVENOUS PRN
Status: COMPLETED | OUTPATIENT
Start: 2023-11-07 | End: 2023-11-07

## 2023-11-07 RX ORDER — BUPIVACAINE HYDROCHLORIDE 5 MG/ML
INJECTION, SOLUTION EPIDURAL; INTRACAUDAL PRN
Status: COMPLETED | OUTPATIENT
Start: 2023-11-07 | End: 2023-11-07

## 2023-11-07 RX ORDER — FLUCONAZOLE 100 MG/1
200 TABLET ORAL DAILY
Status: DISCONTINUED | OUTPATIENT
Start: 2023-11-07 | End: 2023-11-08 | Stop reason: HOSPADM

## 2023-11-07 RX ORDER — FENTANYL CITRATE 50 UG/ML
INJECTION, SOLUTION INTRAMUSCULAR; INTRAVENOUS PRN
Status: COMPLETED | OUTPATIENT
Start: 2023-11-07 | End: 2023-11-07

## 2023-11-07 RX ADMIN — SODIUM CHLORIDE, PRESERVATIVE FREE 10 ML: 5 INJECTION INTRAVENOUS at 20:32

## 2023-11-07 RX ADMIN — IPRATROPIUM BROMIDE 0.5 MG: 0.5 SOLUTION RESPIRATORY (INHALATION) at 21:01

## 2023-11-07 RX ADMIN — MIDAZOLAM HYDROCHLORIDE 1 MG: 1 INJECTION, SOLUTION INTRAMUSCULAR; INTRAVENOUS at 15:05

## 2023-11-07 RX ADMIN — IPRATROPIUM BROMIDE 0.5 MG: 0.5 SOLUTION RESPIRATORY (INHALATION) at 07:15

## 2023-11-07 RX ADMIN — BUPIVACAINE HYDROCHLORIDE 20 ML: 5 INJECTION, SOLUTION EPIDURAL; INTRACAUDAL; PERINEURAL at 15:20

## 2023-11-07 RX ADMIN — BUDESONIDE 500 MCG: 0.5 INHALANT RESPIRATORY (INHALATION) at 21:01

## 2023-11-07 RX ADMIN — KETOROLAC TROMETHAMINE 15 MG: 30 INJECTION, SOLUTION INTRAMUSCULAR at 14:34

## 2023-11-07 RX ADMIN — AMOXICILLIN AND CLAVULANATE POTASSIUM 1 TABLET: 875; 125 TABLET, FILM COATED ORAL at 20:36

## 2023-11-07 RX ADMIN — MIDAZOLAM HYDROCHLORIDE 1 MG: 1 INJECTION, SOLUTION INTRAMUSCULAR; INTRAVENOUS at 15:09

## 2023-11-07 RX ADMIN — ARFORMOTEROL TARTRATE 15 MCG: 15 SOLUTION RESPIRATORY (INHALATION) at 07:15

## 2023-11-07 RX ADMIN — WATER 2000 MG: 1 INJECTION INTRAMUSCULAR; INTRAVENOUS; SUBCUTANEOUS at 14:33

## 2023-11-07 RX ADMIN — DIPHENHYDRAMINE HYDROCHLORIDE 25 MG: 50 INJECTION, SOLUTION INTRAMUSCULAR; INTRAVENOUS at 14:33

## 2023-11-07 RX ADMIN — ARFORMOTEROL TARTRATE 15 MCG: 15 SOLUTION RESPIRATORY (INHALATION) at 21:01

## 2023-11-07 RX ADMIN — BUDESONIDE 500 MCG: 0.5 INHALANT RESPIRATORY (INHALATION) at 07:15

## 2023-11-07 RX ADMIN — LIDOCAINE HYDROCHLORIDE 8 ML: 10 INJECTION, SOLUTION EPIDURAL; INFILTRATION; INTRACAUDAL; PERINEURAL at 15:08

## 2023-11-07 RX ADMIN — FLUCONAZOLE 200 MG: 100 TABLET ORAL at 11:14

## 2023-11-07 RX ADMIN — FENTANYL CITRATE 50 MCG: 50 INJECTION, SOLUTION INTRAMUSCULAR; INTRAVENOUS at 15:05

## 2023-11-07 ASSESSMENT — PAIN SCALES - GENERAL
PAINLEVEL_OUTOF10: 0
PAINLEVEL_OUTOF10: 8
PAINLEVEL_OUTOF10: 5
PAINLEVEL_OUTOF10: 0

## 2023-11-07 ASSESSMENT — PAIN DESCRIPTION - DESCRIPTORS
DESCRIPTORS: ACHING
DESCRIPTORS: ACHING

## 2023-11-07 ASSESSMENT — PAIN DESCRIPTION - PAIN TYPE: TYPE: SURGICAL PAIN

## 2023-11-07 ASSESSMENT — PAIN DESCRIPTION - ORIENTATION
ORIENTATION: LOWER;MID;POSTERIOR
ORIENTATION: MID;LOWER

## 2023-11-07 ASSESSMENT — PAIN SCALES - WONG BAKER
WONGBAKER_NUMERICALRESPONSE: 0
WONGBAKER_NUMERICALRESPONSE: 0

## 2023-11-07 ASSESSMENT — PAIN DESCRIPTION - LOCATION
LOCATION: BACK
LOCATION: BACK;INCISION
LOCATION: BACK

## 2023-11-07 ASSESSMENT — PAIN - FUNCTIONAL ASSESSMENT: PAIN_FUNCTIONAL_ASSESSMENT: 0-10

## 2023-11-07 NOTE — ANESTHESIA PRE PROCEDURE
\"LABABO\", \"LABRH\"    Drug/Infectious Status (If Applicable):  No results found for: \"HIV\", \"HEPCAB\"    COVID-19 Screening (If Applicable): No results found for: \"COVID19\"        Anesthesia Evaluation  Patient summary reviewed and Nursing notes reviewed  Airway: Mallampati: II  TM distance: >3 FB   Neck ROM: full  Mouth opening: > = 3 FB   Dental: normal exam         Pulmonary:Negative Pulmonary ROS breath sounds clear to auscultation  (+) pneumonia:                             Cardiovascular:  Exercise tolerance: good (>4 METS),   (+) hypertension:, pacemaker: pacemaker, dysrhythmias:, hyperlipidemia        Rhythm: regular  Rate: normal                    Neuro/Psych:   Negative Neuro/Psych ROS              GI/Hepatic/Renal:   (+) renal disease: CRI,           Endo/Other:    (+) DiabetesType II DM, , .                 Abdominal:             Vascular: negative vascular ROS. Other Findings:           Anesthesia Plan      MAC and general     ASA 3       Induction: intravenous. MIPS: Postoperative opioids intended and Prophylactic antiemetics administered. Anesthetic plan and risks discussed with patient. Plan discussed with CRNA.     Attending anesthesiologist reviewed and agrees with Preprocedure content                Tj Whitney MD   11/7/2023

## 2023-11-07 NOTE — H&P
Radiology History and Physical    Patient: Maxwell Abreu 80 y.o. female       Chief Complaint: No chief complaint on file. History of Present Illness: 80year old woman with T11 compression fracture. Also has Lt3 compression fracture but this seems to be nearly asymptomatic. Recent pneumonia and multiple recent falls. Has very mild upper back pain, severe mid-lower back pain. 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 Menopause     LMP-? Other and unspecified hyperlipidemia 2011    Rotator cuff tear 2011    right    S/P ERNESTINE-BSO 3/2/2012     Family History   Problem Relation Age of Onset    Cancer Mother     Stroke Father      Social History     Socioeconomic History    Marital status:      Spouse name: Not on file    Number of children: Not on file    Years of education: Not on file    Highest education level: Not on file   Occupational History    Not on file   Tobacco Use    Smoking status: Former     Types: Cigarettes     Quit date: 2007     Years since quittin.5    Smokeless tobacco: Never   Substance and Sexual Activity    Alcohol use:  Yes     Alcohol/week: 5.8 - 8.3 standard drinks of alcohol    Drug use: No    Sexual activity: Not on file   Other Topics Concern    Not on file   Social History Narrative    Not on file     Social Determinants of Health     Financial Resource Strain: Low Risk  (2023)    Overall Financial Resource Strain (CARDIA)     Difficulty of Paying Living Expenses: Not hard at all   Food Insecurity: No Food Insecurity (2023)    Hunger Vital Sign     Worried About Running Out of Food in the Last Year:

## 2023-11-07 NOTE — PROGRESS NOTES
TRANSFER - IN REPORT:    Verbal report received from [facility] on [address]  being received from  for ordered procedure      Report consisted of patient's Situation, Background, Assessment and   Recommendations(SBAR). Information from the following report(s) Nurse Handoff Report and MAR was reviewed with the receiving nurse. Opportunity for questions and clarification was provided. Assessment completed upon patient's arrival to unit and care assumed.

## 2023-11-07 NOTE — PROCEDURES
Interventional Radiology  Procedure Note        11/7/2023 4:05 PM    Patient: Shonda Rocha     Informed consent obtained    Diagnosis: T11 compression fracture    Procedure(s): T11 kyphoplasty    Estimated blood loss: less than 5cc    Anesthesia: Moderate sedation    Specimens removed:  none    Complications: None    Implants: None    Primary Physician: Alex Jimenez MD    Recommendations: N/A    Full dictated report to follow    Alex Jimenez MD  Interventional Radiology  [facility]  4:05 PM, 11/7/2023

## 2023-11-07 NOTE — PROGRESS NOTES
TRANSFER - OUT REPORT:    Verbal report given to Dora Bazzi on [address]  being transferred to [facility] for routine post-op       Report consisted of patient's Situation, Background, Assessment and   Recommendations(SBAR). Information from the following report(s) Nurse Handoff Report and MAR was reviewed with the receiving nurse. Lines:   Peripheral IV 11/04/23 Proximal;Right; Anterior Forearm (Active)   Site Assessment Clean, dry & intact 11/07/23 0831   Line Status Infusing 11/07/23 0815   Line Care Connections checked and tightened 11/07/23 0815   Phlebitis Assessment No symptoms 11/07/23 0831   Infiltration Assessment 0 11/07/23 0815   Alcohol Cap Used Yes 11/07/23 0815   Dressing Status Clean, dry & intact 11/07/23 0815   Dressing Type Transparent 11/07/23 0815   Dressing Intervention Security device changed 11/07/23 0815        Opportunity for questions and clarification was provided.       Patient transported with:  Tech/ Transport

## 2023-11-08 VITALS
SYSTOLIC BLOOD PRESSURE: 135 MMHG | OXYGEN SATURATION: 98 % | TEMPERATURE: 97.2 F | RESPIRATION RATE: 18 BRPM | DIASTOLIC BLOOD PRESSURE: 74 MMHG | WEIGHT: 184.3 LBS | HEART RATE: 97 BPM | BODY MASS INDEX: 27.93 KG/M2 | HEIGHT: 68 IN

## 2023-11-08 LAB
ANION GAP SERPL CALC-SCNC: 8 MMOL/L (ref 5–15)
BASOPHILS # BLD: 0 K/UL (ref 0–0.1)
BASOPHILS NFR BLD: 1 % (ref 0–1)
BUN SERPL-MCNC: 12 MG/DL (ref 6–20)
BUN/CREAT SERPL: 17 (ref 12–20)
CALCIUM SERPL-MCNC: 8.9 MG/DL (ref 8.5–10.1)
CHLORIDE SERPL-SCNC: 100 MMOL/L (ref 97–108)
CO2 SERPL-SCNC: 26 MMOL/L (ref 21–32)
CREAT SERPL-MCNC: 0.71 MG/DL (ref 0.55–1.02)
DIFFERENTIAL METHOD BLD: ABNORMAL
EOSINOPHIL # BLD: 0.1 K/UL (ref 0–0.4)
EOSINOPHIL NFR BLD: 2 % (ref 0–7)
ERYTHROCYTE [DISTWIDTH] IN BLOOD BY AUTOMATED COUNT: 12.6 % (ref 11.5–14.5)
GLUCOSE BLD STRIP.AUTO-MCNC: 110 MG/DL (ref 65–117)
GLUCOSE BLD STRIP.AUTO-MCNC: 74 MG/DL (ref 65–117)
GLUCOSE SERPL-MCNC: 78 MG/DL (ref 65–100)
HCT VFR BLD AUTO: 31.7 % (ref 35–47)
HGB BLD-MCNC: 9.9 G/DL (ref 11.5–16)
IMM GRANULOCYTES # BLD AUTO: 0.1 K/UL (ref 0–0.04)
IMM GRANULOCYTES NFR BLD AUTO: 1 % (ref 0–0.5)
LYMPHOCYTES # BLD: 0.9 K/UL (ref 0.8–3.5)
LYMPHOCYTES NFR BLD: 16 % (ref 12–49)
MCH RBC QN AUTO: 29.4 PG (ref 26–34)
MCHC RBC AUTO-ENTMCNC: 31.2 G/DL (ref 30–36.5)
MCV RBC AUTO: 94.1 FL (ref 80–99)
MONOCYTES # BLD: 1 K/UL (ref 0–1)
MONOCYTES NFR BLD: 18 % (ref 5–13)
NEUTS SEG # BLD: 3.5 K/UL (ref 1.8–8)
NEUTS SEG NFR BLD: 62 % (ref 32–75)
NRBC # BLD: 0 K/UL (ref 0–0.01)
NRBC BLD-RTO: 0 PER 100 WBC
PLATELET # BLD AUTO: 219 K/UL (ref 150–400)
PMV BLD AUTO: 10.5 FL (ref 8.9–12.9)
POTASSIUM SERPL-SCNC: 3.7 MMOL/L (ref 3.5–5.1)
RBC # BLD AUTO: 3.37 M/UL (ref 3.8–5.2)
SERVICE CMNT-IMP: NORMAL
SERVICE CMNT-IMP: NORMAL
SODIUM SERPL-SCNC: 134 MMOL/L (ref 136–145)
WBC # BLD AUTO: 5.5 K/UL (ref 3.6–11)

## 2023-11-08 PROCEDURE — 36415 COLL VENOUS BLD VENIPUNCTURE: CPT

## 2023-11-08 PROCEDURE — 82962 GLUCOSE BLOOD TEST: CPT

## 2023-11-08 PROCEDURE — 80048 BASIC METABOLIC PNL TOTAL CA: CPT

## 2023-11-08 PROCEDURE — 6360000002 HC RX W HCPCS: Performed by: HOSPITALIST

## 2023-11-08 PROCEDURE — 6360000002 HC RX W HCPCS: Performed by: FAMILY MEDICINE

## 2023-11-08 PROCEDURE — 94640 AIRWAY INHALATION TREATMENT: CPT

## 2023-11-08 PROCEDURE — 2700000000 HC OXYGEN THERAPY PER DAY

## 2023-11-08 PROCEDURE — 6360000002 HC RX W HCPCS: Performed by: STUDENT IN AN ORGANIZED HEALTH CARE EDUCATION/TRAINING PROGRAM

## 2023-11-08 PROCEDURE — 97168 OT RE-EVAL EST PLAN CARE: CPT

## 2023-11-08 PROCEDURE — 97535 SELF CARE MNGMENT TRAINING: CPT

## 2023-11-08 PROCEDURE — 94760 N-INVAS EAR/PLS OXIMETRY 1: CPT

## 2023-11-08 PROCEDURE — 85025 COMPLETE CBC W/AUTO DIFF WBC: CPT

## 2023-11-08 PROCEDURE — 6370000000 HC RX 637 (ALT 250 FOR IP): Performed by: FAMILY MEDICINE

## 2023-11-08 PROCEDURE — 97164 PT RE-EVAL EST PLAN CARE: CPT

## 2023-11-08 PROCEDURE — 6370000000 HC RX 637 (ALT 250 FOR IP): Performed by: STUDENT IN AN ORGANIZED HEALTH CARE EDUCATION/TRAINING PROGRAM

## 2023-11-08 PROCEDURE — 6370000000 HC RX 637 (ALT 250 FOR IP): Performed by: PHYSICIAN ASSISTANT

## 2023-11-08 RX ORDER — TRAMADOL HYDROCHLORIDE 50 MG/1
50 TABLET ORAL EVERY 8 HOURS PRN
Qty: 9 TABLET | Refills: 0 | Status: SHIPPED | OUTPATIENT
Start: 2023-11-08 | End: 2023-11-11

## 2023-11-08 RX ORDER — FLUCONAZOLE 200 MG/1
200 TABLET ORAL DAILY
Qty: 6 TABLET | Refills: 0 | Status: SHIPPED
Start: 2023-11-08 | End: 2023-11-14

## 2023-11-08 RX ORDER — AMOXICILLIN AND CLAVULANATE POTASSIUM 875; 125 MG/1; MG/1
1 TABLET, FILM COATED ORAL EVERY 12 HOURS SCHEDULED
Qty: 32 TABLET | Refills: 0 | Status: SHIPPED
Start: 2023-11-08 | End: 2023-11-24

## 2023-11-08 RX ORDER — LACTOBACILLUS RHAMNOSUS GG 10B CELL
1 CAPSULE ORAL
Qty: 30 CAPSULE | Refills: 0 | Status: SHIPPED
Start: 2023-11-08 | End: 2023-12-08

## 2023-11-08 RX ORDER — PANTOPRAZOLE SODIUM 40 MG/1
40 TABLET, DELAYED RELEASE ORAL
Qty: 30 TABLET | Refills: 0 | Status: SHIPPED
Start: 2023-11-09 | End: 2023-12-09

## 2023-11-08 RX ADMIN — AMOXICILLIN AND CLAVULANATE POTASSIUM 1 TABLET: 875; 125 TABLET, FILM COATED ORAL at 09:48

## 2023-11-08 RX ADMIN — LISINOPRIL 10 MG: 10 TABLET ORAL at 09:48

## 2023-11-08 RX ADMIN — BUDESONIDE 500 MCG: 0.5 INHALANT RESPIRATORY (INHALATION) at 09:05

## 2023-11-08 RX ADMIN — AMLODIPINE BESYLATE 2.5 MG: 5 TABLET ORAL at 09:48

## 2023-11-08 RX ADMIN — CETIRIZINE HYDROCHLORIDE 10 MG: 10 TABLET, FILM COATED ORAL at 09:48

## 2023-11-08 RX ADMIN — IPRATROPIUM BROMIDE 0.5 MG: 0.5 SOLUTION RESPIRATORY (INHALATION) at 09:05

## 2023-11-08 RX ADMIN — FLUCONAZOLE 200 MG: 100 TABLET ORAL at 09:48

## 2023-11-08 RX ADMIN — ARFORMOTEROL TARTRATE 15 MCG: 15 SOLUTION RESPIRATORY (INHALATION) at 09:05

## 2023-11-08 RX ADMIN — Medication 1 CAPSULE: at 09:48

## 2023-11-08 RX ADMIN — ENOXAPARIN SODIUM 40 MG: 100 INJECTION SUBCUTANEOUS at 09:48

## 2023-11-08 RX ADMIN — PANTOPRAZOLE SODIUM 40 MG: 40 TABLET, DELAYED RELEASE ORAL at 07:06

## 2023-11-08 RX ADMIN — TRAMADOL HYDROCHLORIDE 50 MG: 50 TABLET ORAL at 11:41

## 2023-11-08 RX ADMIN — FLUOXETINE 20 MG: 20 CAPSULE ORAL at 09:49

## 2023-11-08 ASSESSMENT — PAIN SCALES - GENERAL: PAINLEVEL_OUTOF10: 5

## 2023-11-08 ASSESSMENT — PAIN DESCRIPTION - LOCATION: LOCATION: BACK

## 2023-11-08 NOTE — CARE COORDINATION
Transition of Care Plan:    RUR: 14%  Prior Level of Functioning: Independent   Disposition: SNF   If SNF or IPR: Date FOC offered: 11/2   Date FOC received: 11/2   Accepting facility: [facility] call report [phone]    Follow up appointments: PCP   DME needed: 2L of [place]   Transportation at discharge: [place]   IM/IMM Medicare/ letter given: Received   Caregiver Contact: Troy Serna (Child)   [phone]   Discharge Caregiver contacted prior to discharge? Yes   Transition of Care Plan to SNF/Rehab    Communication to Patient/Family:  Met with patient and family and they are agreeable to the transition plan. The Plan for Transition of Care is related to the following treatment goals: SNF    The Patient and/or patient representative was provided with a choice of provider and agrees  with the discharge plan. Yes [x] No []    A Freedom of choice list was provided with basic dialogue that supports the patient's individualized plan of care/goals and shares the quality data associated with the providers. Yes [x] No []    SNF/Rehab Transition:  Patient has been accepted to [facility]  SNF/Rehab and meets criteria for admission. Patient will transported by [employer] and expected to leave at 2pm.    Communication to SNF/Rehab:  Bedside RN, Jaron Woodard, has been notified to update the transition plan to the facility and call report (phone number). Discharge information has been updated on the AVS. And communicated to facility via [employer], or CC link. Discharge instructions to be fax'd to facility at [facility] #). Nursing Please include all hard scripts for controlled substances, med rec and dc summary, and AVS in packet.      Reviewed and confirmed with facility, can manage the patient care needs for the following:     Osmin Carton with (X) only those applicable:  Medication:  [x]Medications are available at the facility  []IV Antibiotics    [x]Controlled Substance - hard copies

## 2023-11-08 NOTE — DISCHARGE SUMMARY
Discharge Summary       PATIENT ID: Maria Isabel Bose  MRN: [id number]   YOB: 1934    DATE OF ADMISSION: 10/31/2023  8:13 PM    DATE OF DISCHARGE: 11/08/23    PRIMARY CARE PROVIDER: Iveth Parker MD     ATTENDING PHYSICIAN: Bard Yashira MD   DISCHARGING PROVIDER: Bard Yashira MD    To contact this individual call [phone] and ask the  to page. If unavailable ask to be transferred the Adult Hospitalist Department. CONSULTATIONS: IP CONSULT TO HOSPITALIST  IP CONSULT TO PHARMACY  IP CONSULT TO PULMONOLOGY  IP CONSULT TO INTERVENTIONAL RADIOLOGY  IP CONSULT TO CARDIOLOGY  IP CONSULT TO DIETITIAN  IP CONSULT TO CASE MANAGEMENT    PROCEDURES/SURGERIES: * No surgery found *     ADMITTING [facility], [address] COURSE:   HPI: \"Kenya Booker is a 80 y.o. female with a pmhx CHB s/p PPM, DM II, HTN, dyslipidemia, and recent lung abscess treated with clindamycin who presents with fatigue, decreased appetite, and falls,and is being admitted for postobstructive pna. She was previously treated for a lung abscess with clindamycin for 5 days, but patient developed diarrhea over the past 2 to 3 days, and stopped the clindamycin. She subsequently began to feel fatigued, and had decreased appetite so presented to the ED for further evaluation. In the ED, HR was elevated to 101. Other VSS. Labs showed  WBC 16, Hgb 13.6>10.4, creatinine 1.28, albumin 3,  and procal 0.55. CT chest showed new medial RLL consolidation likely post obstructive pneumonia surrounding previous opacity with central attenuation, moderate non-specific esophagitis with esophageal wall thickening, and acute/subacute severe T11 and mild inferior endplate T3 vertebral fractures.      In the ED, she received ceftriaxone, and azithromycin\"        DISCHARGE DIAGNOSES / PLAN:      Postobstructive pneumonia  #hypoxia  #former smoker  -CT head with new moderate medial right lower lobe consolidation, surround previous

## 2023-11-08 NOTE — PLAN OF CARE
Problem: Chronic Conditions and Co-morbidities  Goal: Patient's chronic conditions and co-morbidity symptoms are monitored and maintained or improved  Outcome: Progressing  Flowsheets (Taken 11/1/2023 1121)  Care Plan - Patient's Chronic Conditions and Co-Morbidity Symptoms are Monitored and Maintained or Improved:   Monitor and assess patient's chronic conditions and comorbid symptoms for stability, deterioration, or improvement   Collaborate with multidisciplinary team to address chronic and comorbid conditions and prevent exacerbation or deterioration   Update acute care plan with appropriate goals if chronic or comorbid symptoms are exacerbated and prevent overall improvement and discharge
Problem: Discharge Planning  Goal: Discharge to home or other facility with appropriate resources  11/6/2023 1337 by Aurelia Llanes RN  Outcome: Progressing  11/6/2023 0001 by Sudheer Jean Baptiste RN  Outcome: Progressing
Problem: Discharge Planning  Goal: Discharge to home or other facility with appropriate resources  11/7/2023 1112 by Yvonne Kendrick RN  Outcome: Progressing  Flowsheets (Taken 11/7/2023 0815)  Discharge to home or other facility with appropriate resources: Identify barriers to discharge with patient and caregiver  11/7/2023 0232 by Bhavesh Darnell RN  Outcome: Progressing
Problem: Discharge Planning  Goal: Discharge to home or other facility with appropriate resources  Outcome: Progressing
Problem: Discharge Planning  Goal: Discharge to home or other facility with appropriate resources  Outcome: Progressing     Problem: Chronic Conditions and Co-morbidities  Goal: Patient's chronic conditions and co-morbidity symptoms are monitored and maintained or improved  Outcome: Progressing  Flowsheets (Taken 11/4/2023 1922)  Care Plan - Patient's Chronic Conditions and Co-Morbidity Symptoms are Monitored and Maintained or Improved: Monitor and assess patient's chronic conditions and comorbid symptoms for stability, deterioration, or improvement     Problem: Skin/Tissue Integrity  Goal: Absence of new skin breakdown  Description: 1. Monitor for areas of redness and/or skin breakdown  2. Assess vascular access sites hourly  3. Every 4-6 hours minimum:  Change oxygen saturation probe site  4. Every 4-6 hours:  If on nasal continuous positive airway pressure, respiratory therapy assess nares and determine need for appliance change or resting period.   Outcome: Progressing     Problem: Safety - Adult  Goal: Free from fall injury  Outcome: Progressing     Problem: Pain  Goal: Verbalizes/displays adequate comfort level or baseline comfort level  Outcome: Progressing  Flowsheets (Taken 11/4/2023 1922)  Verbalizes/displays adequate comfort level or baseline comfort level: Encourage patient to monitor pain and request assistance     Problem: Nutrition Deficit:  Goal: Optimize nutritional status  Outcome: Progressing
Problem: Discharge Planning  Goal: Discharge to home or other facility with appropriate resources  Outcome: Progressing  Flowsheets (Taken 11/8/2023 0815)  Discharge to home or other facility with appropriate resources: Identify barriers to discharge with patient and caregiver
Problem: Occupational Therapy - Adult  Goal: By Discharge: Performs self-care activities at highest level of function for planned discharge setting. See evaluation for individualized goals. Description: FUNCTIONAL STATUS PRIOR TO ADMISSION:  At baseline Pt receives assistance with BADLs and functional mobility using a rollator from her daughter and does not use supplemental O2. Pt reports she spends most of her time watching tv. Pt currently requiring Patricia-ModA x2 for functional transfers and totalA for LB BADLs 2/2 orthostatic hypotension, low activity tolerance, impaired standing balance, unable to reach BLE, and on 2L O2.   HOME SUPPORT: Patient lived with daughter who was providing assistance. Occupational Therapy Goals  Reviewed and updated for Re-eval 11/8/2023    1. Patient will perform lower body dressing with Minimal Assist using within 7 days. 2.  Patient will perform upper body dressing with Set-up within 7 days. 3.  Patient will standing ADLs 5 mins at Stand by Assist within 7 days. 5.  Patient will verbalize/demonstrate 3/3 back precautions during ADL tasks without cues within  7 days. Initiated 11/2/2023  1. Patient will perform self-feeding with Set-up within 7 day(s). 2.  Patient will perform grooming with Stand by Assist within 7 day(s) while seated. 3.  Patient will perform toilet transfers with Moderate Assist and Assist x1  within 7 day(s) using AE PRN. 4.  Patient will perform all aspects of toileting with Moderate Assist within 7 day(s). 5.  Patient will participate in upper extremity therapeutic exercise/activities with Stand by Assist with verbal cues for reminders. 6.  Patient will utilize energy conservation techniques during functional activities with verbal cues within 7 day(s).     Outcome: Progressing   OCCUPATIONAL THERAPY RE-EVALUATION  Patient: Selina Tuttle (84 y.o. female)  Date: 11/8/2023  Primary Diagnosis: Hypoxia [R09.02]  Postobstructive
Problem: Occupational Therapy - Adult  Goal: By Discharge: Performs self-care activities at highest level of function for planned discharge setting. See evaluation for individualized goals. Description: FUNCTIONAL STATUS PRIOR TO ADMISSION:  At baseline Pt receives assistance with BADLs and functional mobility using a rollator from her daughter and does not use supplemental O2. Pt reports she spends most of her time watching tv. Pt currently requiring Patricia-ModA x2 for functional transfers and totalA for LB BADLs 2/2 orthostatic hypotension, low activity tolerance, impaired standing balance, unable to reach BLE, and on 2L O2.   HOME SUPPORT: Patient lived with daughter who was providing assistance. Occupational Therapy Goals:  Initiated 11/2/2023  1. Patient will perform self-feeding with Set-up within 7 day(s). 2.  Patient will perform grooming with Stand by Assist within 7 day(s) while seated. 3.  Patient will perform toilet transfers with Moderate Assist and Assist x1  within 7 day(s) using AE PRN. 4.  Patient will perform all aspects of toileting with Moderate Assist within 7 day(s). 5.  Patient will participate in upper extremity therapeutic exercise/activities with Stand by Assist with verbal cues for reminders. 6.  Patient will utilize energy conservation techniques during functional activities with verbal cues within 7 day(s). Outcome: Progressing    OCCUPATIONAL THERAPY EVALUATION    Patient: Mickey Ho (80 y.o. female)  Date: 11/2/2023  Primary Diagnosis: Hypoxia [R09.02]  Postobstructive pneumonia [J18.9]         Precautions: Fall Risk, Bed Alarm                  ASSESSMENT :  The patient is limited by decreased functional mobility, independence in ADLs, strength, activity tolerance, endurance, safety awareness, cognition, balance, posture, orthostatic hypotension, requiring 2 L supplemental O2, posterior leaning when standing, impaired functional reach to LE .  Pt currently
Problem: Physical Therapy - Adult  Goal: By Discharge: Performs mobility at highest level of function for planned discharge setting. See evaluation for individualized goals. Description: FUNCTIONAL STATUS PRIOR TO ADMISSION: DTR reports she was needing to assist pt with ADLs and walking behind her at [facility] for safety. DTR states this is a recent decline from PLOF. HOME SUPPORT PRIOR TO ADMISSION: Pt's DTR has been assisting her but needs to return to work now. Physical Therapy Goals  Initiated 11/2/2023  1. Patient will move from supine to sit and sit to supine in bed with supervision/set-up within 7 day(s). 2.  Patient will perform sit to stand with supervision/set-up within 7 day(s). 3.  Patient will transfer from bed to chair and chair to bed with supervision/set-up using the least restrictive device within 7 day(s). 4.  Patient will ambulate with contact guard assist for 50 feet with the least restrictive device within 7 day(s). 5.  Patient will ascend/descend stairs with handrail(s) per home needs with minimal assistance within 7 day(s). Outcome: Progressing   PHYSICAL THERAPY EVALUATION    Patient: Dianelys Sherwood (80 y.o. female)  Date: 11/2/2023  Primary Diagnosis: Hypoxia [R09.02]  Postobstructive pneumonia [J18.9]       Precautions: Fall Risk, Bed Alarm                    ASSESSMENT :   DEFICITS/IMPAIRMENTS:   The patient is limited by decreased ROM, strength, activity tolerance, endurance, cognition, balance, and desaturation on RA resulting in impaired functional mobility from reported baseline status. Pt's DTR bedside and supportive. DTR states she has been assisting the pt in home setting though she needs to return to work- she is hoping for brief rehab stay prior to return home for improved independence. This author spoke with OT and pt seen following their session. Bps checked per reported concerns with OT.  Pt agreeable today to transfer training, VS assessment for BP/SpO2,
Problem: Physical Therapy - Adult  Goal: By Discharge: Performs mobility at highest level of function for planned discharge setting. See evaluation for individualized goals. Description: FUNCTIONAL STATUS PRIOR TO ADMISSION: DTR reports she was needing to assist pt with ADLs and walking behind her at [facility] for safety. DTR states this is a recent decline from PLOF. HOME SUPPORT PRIOR TO ADMISSION: Pt's DTR has been assisting her but needs to return to work now. Physical Therapy Goals  Initiated 11/2/2023, Goals remain appropriate 11/8, with addition of goal 6:  1. Patient will move from supine to sit and sit to supine in bed with supervision/set-up within 7 day(s). 2.  Patient will perform sit to stand with supervision/set-up within 7 day(s). 3.  Patient will transfer from bed to chair and chair to bed with supervision/set-up using the least restrictive device within 7 day(s). 4.  Patient will ambulate with contact guard assist for 50 feet with the least restrictive device within 7 day(s). 5.  Patient will ascend/descend stairs with handrail(s) per home needs with minimal assistance within 7 day(s). 6.  Patient will demonstrate and verbalize good understanding of spinal precautions with independence within 7 day(s). 11/8/2023 1048 by Myrtle Bucio PT  Outcome: Progressing     PHYSICAL THERAPY EVALUATION    Patient: Romain Ren (80 y.o. female)  Date: 11/8/2023  Primary Diagnosis: Hypoxia [R09.02]  Postobstructive pneumonia [J18.9]       Precautions: Fall Risk, Bed Alarm                    ASSESSMENT :   DEFICITS/IMPAIRMENTS:   The patient is limited by decreased functional mobility, independence in ADLs, strength, activity tolerance, safety awareness, coordination, balance s/p admission for PNA, now s/p T11 kyphoplasty POD#1. Patient was educated on spinal precautions in preparation for mobility.  She demonstrated bed mobility, 3x sit to stand transfers, and short gait with contact guard
Problem: Skin/Tissue Integrity  Goal: Absence of new skin breakdown  Description: 1. Monitor for areas of redness and/or skin breakdown  2. Assess vascular access sites hourly  3. Every 4-6 hours minimum:  Change oxygen saturation probe site  4. Every 4-6 hours:  If on nasal continuous positive airway pressure, respiratory therapy assess nares and determine need for appliance change or resting period.   Outcome: Progressing     Problem: Safety - Adult  Goal: Free from fall injury  Outcome: Progressing
session(see BP below). Will continue to progress mobility w/ plans to amb to/from bathroom/ hallway ,as appropriate, next session. For the weekend, recommend patient to complete as able in order to maintain strength, endurance and independence with nursing: OOB to chair 3x/day with the RW. PT to follow-up with patient after the weekend. Vitals:    11/03/23    1013 11/03/23 1019 11/03/23 1023 11/03/23 1026 11/03/23 1031   BP: 129/59 (!) 105/57 (!) 114/57 112/81 (!) 106/58   Pulse: 99 (!) 118 98 (!) 109 93   Position: Seated EOB Standing (bedside) Up in Chair Standing Up in Chair   SpO2: 96% (RA) 95% (RA) 96% (RA) 96% (RA) 92% (RA)          PLAN:  Patient continues to benefit from skilled intervention to address the above impairments. Continue treatment per established plan of care. Recommendation for discharge: (in order for the patient to meet his/her long term goals): Therapy up to 5 days/week in Skilled nursing facility    Other factors to consider for discharge: available support system works or is unable to provide adequate supervision and the patient would be alone, high risk for falls, not safe to be alone, and concern for safely navigating or managing the home environment    IF patient discharges home will need the following DME: patient owns DME required for discharge       SUBJECTIVE:   Patient stated, \"I'm. . the same. Betty Dulce \"    OBJECTIVE DATA SUMMARY:     Functional Mobility Training:  Bed Mobility:  Bed Mobility Training  Bed Mobility Training: Yes  Overall Level of Assistance: Minimum assistance;Assist X1;Additional time; Other (comment) (HOB elevated (semi-rudd's position))  Supine to Sit: Minimum assistance;Assist X1;Additional time  Sit to Supine:  (remained OOB in chair)  Scooting: Contact-guard assistance;Assist X1;Stand-by assistance  Transfers:  Transfer Training  Transfer Training: Yes  Sit to Stand: Contact-guard assistance;Assist X1;Additional time  Stand to Sit: Contact-guard
Stand by Assist within 7 day(s) while seated. 3.  Patient will perform toilet transfers with Moderate Assist and Assist x1  within 7 day(s) using AE PRN. 4.  Patient will perform all aspects of toileting with Moderate Assist within 7 day(s). 5.  Patient will participate in upper extremity therapeutic exercise/activities with Stand by Assist with verbal cues for reminders. 6.  Patient will utilize energy conservation techniques during functional activities with verbal cues within 7 day(s).     11/3/2023 1256 by Klaudia Yi OT  Outcome: Progressing     Problem: Nutrition Deficit:  Goal: Optimize nutritional status  11/4/2023 0139 by Dillon Mcneil RN  Outcome: Progressing  11/3/2023 1245 by Jez Ferrer RN  Outcome: Progressing
The patient's plan of care was discussed with: physical therapist    Patient Education  Education Given To: Patient; Family  Education Provided: Role of Therapy; Fall Prevention Strategies;Precautions; ADL Adaptive Strategies;Transfer Training  Education Method: Verbal  Barriers to Learning: None  Education Outcome: Verbalized understanding;Continued education needed    Thank you for this referral.  Fe Gamble, OT  Minutes: 32

## 2023-11-28 ENCOUNTER — HOSPITAL ENCOUNTER (OUTPATIENT)
Facility: HOSPITAL | Age: 88
Discharge: HOME | End: 2023-12-01
Attending: INTERNAL MEDICINE
Payer: MEDICARE

## 2023-11-28 DIAGNOSIS — R93.89 ABNORMAL CHEST CT: ICD-10-CM

## 2023-11-28 PROCEDURE — 71250 CT THORAX DX C-: CPT

## 2023-12-12 ENCOUNTER — OFFICE VISIT (OUTPATIENT)
Age: 88
End: 2023-12-12

## 2023-12-12 VITALS
DIASTOLIC BLOOD PRESSURE: 62 MMHG | RESPIRATION RATE: 18 BRPM | TEMPERATURE: 97.8 F | BODY MASS INDEX: 26.95 KG/M2 | OXYGEN SATURATION: 97 % | HEIGHT: 68 IN | WEIGHT: 177.8 LBS | HEART RATE: 87 BPM | SYSTOLIC BLOOD PRESSURE: 96 MMHG

## 2023-12-12 DIAGNOSIS — I10 ESSENTIAL HYPERTENSION, BENIGN: ICD-10-CM

## 2023-12-12 DIAGNOSIS — N18.30 STAGE 3 CHRONIC KIDNEY DISEASE, UNSPECIFIED WHETHER STAGE 3A OR 3B CKD (HCC): ICD-10-CM

## 2023-12-12 DIAGNOSIS — D64.9 ANEMIA, UNSPECIFIED TYPE: ICD-10-CM

## 2023-12-12 DIAGNOSIS — E11.21 TYPE 2 DIABETES WITH NEPHROPATHY (HCC): ICD-10-CM

## 2023-12-12 DIAGNOSIS — I10 ESSENTIAL HYPERTENSION, BENIGN: Primary | ICD-10-CM

## 2023-12-12 SDOH — ECONOMIC STABILITY: FOOD INSECURITY: WITHIN THE PAST 12 MONTHS, THE FOOD YOU BOUGHT JUST DIDN'T LAST AND YOU DIDN'T HAVE MONEY TO GET MORE.: NEVER TRUE

## 2023-12-12 SDOH — ECONOMIC STABILITY: HOUSING INSECURITY
IN THE LAST 12 MONTHS, WAS THERE A TIME WHEN YOU DID NOT HAVE A STEADY PLACE TO SLEEP OR SLEPT IN A SHELTER (INCLUDING NOW)?: NO

## 2023-12-12 SDOH — ECONOMIC STABILITY: INCOME INSECURITY: HOW HARD IS IT FOR YOU TO PAY FOR THE VERY BASICS LIKE FOOD, HOUSING, MEDICAL CARE, AND HEATING?: NOT HARD AT ALL

## 2023-12-12 SDOH — ECONOMIC STABILITY: FOOD INSECURITY: WITHIN THE PAST 12 MONTHS, YOU WORRIED THAT YOUR FOOD WOULD RUN OUT BEFORE YOU GOT MONEY TO BUY MORE.: NEVER TRUE

## 2023-12-12 ASSESSMENT — PATIENT HEALTH QUESTIONNAIRE - PHQ9
2. FEELING DOWN, DEPRESSED OR HOPELESS: 1
SUM OF ALL RESPONSES TO PHQ9 QUESTIONS 1 & 2: 2
SUM OF ALL RESPONSES TO PHQ QUESTIONS 1-9: 2
1. LITTLE INTEREST OR PLEASURE IN DOING THINGS: 1
SUM OF ALL RESPONSES TO PHQ QUESTIONS 1-9: 2

## 2023-12-12 NOTE — PROGRESS NOTES
PPI  Hypertension: stable  H/O hyperglycemia: Hgb A1C 5.4 % 6/12/23  Osteoporosis: status post kyphoplasty. Patient will discuss long term treatment with Delilah Hebert MD at her 12/18/23 appointment.   CRI: stable

## 2023-12-13 LAB
ANION GAP SERPL CALC-SCNC: 6 MMOL/L (ref 5–15)
BASOPHILS # BLD: 0.1 K/UL (ref 0–0.1)
BASOPHILS NFR BLD: 1 % (ref 0–1)
BUN SERPL-MCNC: 19 MG/DL (ref 6–20)
BUN/CREAT SERPL: 9 (ref 12–20)
CALCIUM SERPL-MCNC: 8.2 MG/DL (ref 8.5–10.1)
CHLORIDE SERPL-SCNC: 118 MMOL/L (ref 97–108)
CO2 SERPL-SCNC: 24 MMOL/L (ref 21–32)
CREAT SERPL-MCNC: 2.08 MG/DL (ref 0.55–1.02)
DIFFERENTIAL METHOD BLD: ABNORMAL
EOSINOPHIL # BLD: 1 K/UL (ref 0–0.4)
EOSINOPHIL NFR BLD: 15 % (ref 0–7)
ERYTHROCYTE [DISTWIDTH] IN BLOOD BY AUTOMATED COUNT: 14.8 % (ref 11.5–14.5)
GLUCOSE SERPL-MCNC: 136 MG/DL (ref 65–100)
HCT VFR BLD AUTO: 33.4 % (ref 35–47)
HGB BLD-MCNC: 10.3 G/DL (ref 11.5–16)
IMM GRANULOCYTES # BLD AUTO: 0.1 K/UL (ref 0–0.04)
IMM GRANULOCYTES NFR BLD AUTO: 1 % (ref 0–0.5)
LYMPHOCYTES # BLD: 0.7 K/UL (ref 0.8–3.5)
LYMPHOCYTES NFR BLD: 11 % (ref 12–49)
MCH RBC QN AUTO: 29.3 PG (ref 26–34)
MCHC RBC AUTO-ENTMCNC: 30.8 G/DL (ref 30–36.5)
MCV RBC AUTO: 95.2 FL (ref 80–99)
MONOCYTES # BLD: 0.8 K/UL (ref 0–1)
MONOCYTES NFR BLD: 13 % (ref 5–13)
NEUTS SEG # BLD: 3.8 K/UL (ref 1.8–8)
NEUTS SEG NFR BLD: 59 % (ref 32–75)
NRBC # BLD: 0 K/UL (ref 0–0.01)
NRBC BLD-RTO: 0 PER 100 WBC
PLATELET # BLD AUTO: 139 K/UL (ref 150–400)
PMV BLD AUTO: 12.3 FL (ref 8.9–12.9)
POTASSIUM SERPL-SCNC: 4.6 MMOL/L (ref 3.5–5.1)
RBC # BLD AUTO: 3.51 M/UL (ref 3.8–5.2)
RBC MORPH BLD: ABNORMAL
SODIUM SERPL-SCNC: 148 MMOL/L (ref 136–145)
WBC # BLD AUTO: 6.5 K/UL (ref 3.6–11)

## 2023-12-14 DIAGNOSIS — N28.9 WORSENING RENAL FUNCTION: Primary | ICD-10-CM

## 2023-12-18 DIAGNOSIS — N28.9 WORSENING RENAL FUNCTION: ICD-10-CM

## 2023-12-19 LAB
ANION GAP SERPL CALC-SCNC: 11 MMOL/L (ref 5–15)
BUN SERPL-MCNC: 26 MG/DL (ref 6–20)
BUN/CREAT SERPL: 9 (ref 12–20)
CALCIUM SERPL-MCNC: 8.1 MG/DL (ref 8.5–10.1)
CHLORIDE SERPL-SCNC: 113 MMOL/L (ref 97–108)
CO2 SERPL-SCNC: 20 MMOL/L (ref 21–32)
CREAT SERPL-MCNC: 2.93 MG/DL (ref 0.55–1.02)
GLUCOSE SERPL-MCNC: 161 MG/DL (ref 65–100)
POTASSIUM SERPL-SCNC: 3.5 MMOL/L (ref 3.5–5.1)
SODIUM SERPL-SCNC: 144 MMOL/L (ref 136–145)

## 2023-12-26 DIAGNOSIS — N17.9 AKI (ACUTE KIDNEY INJURY) (HCC): ICD-10-CM

## 2023-12-26 LAB
ANION GAP SERPL CALC-SCNC: 9 MMOL/L (ref 5–15)
BUN SERPL-MCNC: 30 MG/DL (ref 6–20)
BUN/CREAT SERPL: 8 (ref 12–20)
CALCIUM SERPL-MCNC: 8.2 MG/DL (ref 8.5–10.1)
CHLORIDE SERPL-SCNC: 116 MMOL/L (ref 97–108)
CO2 SERPL-SCNC: 20 MMOL/L (ref 21–32)
CREAT SERPL-MCNC: 3.57 MG/DL (ref 0.55–1.02)
GLUCOSE SERPL-MCNC: 162 MG/DL (ref 65–100)
POTASSIUM SERPL-SCNC: 4.1 MMOL/L (ref 3.5–5.1)
SODIUM SERPL-SCNC: 145 MMOL/L (ref 136–145)

## 2023-12-27 RX ORDER — MIRTAZAPINE 15 MG/1
15 TABLET, FILM COATED ORAL NIGHTLY
Qty: 30 TABLET | OUTPATIENT
Start: 2023-12-27

## 2023-12-27 NOTE — TELEPHONE ENCOUNTER
Patient's daughter requesting refill of the following medication for her mother:    mirtazapine (REMERON) 15 MG tablet     [facility] #[id number]

## 2023-12-28 ENCOUNTER — TELEPHONE (OUTPATIENT)
Age: 88
End: 2023-12-28

## 2023-12-28 DIAGNOSIS — N17.9 AKI (ACUTE KIDNEY INJURY) (HCC): Primary | ICD-10-CM

## 2023-12-28 RX ORDER — MIRTAZAPINE 15 MG/1
15 TABLET, FILM COATED ORAL NIGHTLY
Qty: 90 TABLET | Refills: 1 | Status: SHIPPED | OUTPATIENT
Start: 2023-12-28

## 2023-12-28 NOTE — TELEPHONE ENCOUNTER
12/28/2023 spoke with Elenita Ryan, patient's daughter    -renal function on labs done on 12/26/2023 shows worsening of function. She stopped antibiotics on 12/20/2023.    -spoke with Dr. Jacqueline Patterson - will stop benazepril which may be contributing to acute renal insufficiency. May be AIN from antibiotics. Will repeat labs including BMP, UA, urine protein in 1 week. Renal ultrasound at that time if renal function continues to decline. She will monitor blood pressure at home while off of bp medications    -she is also requesting a refill of her remeron which was started in rehab for depression and assist with sleep. It has been working well for her. Patient stated understanding of the instructions and plan for medical care as outlined.

## 2024-01-02 DIAGNOSIS — N17.9 AKI (ACUTE KIDNEY INJURY) (HCC): ICD-10-CM

## 2024-01-03 DIAGNOSIS — N17.9 AKI (ACUTE KIDNEY INJURY) (HCC): ICD-10-CM

## 2024-01-03 LAB
ALBUMIN SERPL-MCNC: 2.8 G/DL (ref 3.5–5)
ALBUMIN/GLOB SERPL: 0.9 (ref 1.1–2.2)
ALP SERPL-CCNC: 95 U/L (ref 45–117)
ALT SERPL-CCNC: 21 U/L (ref 12–78)
ANION GAP SERPL CALC-SCNC: 7 MMOL/L (ref 5–15)
APPEARANCE UR: CLEAR
AST SERPL-CCNC: 19 U/L (ref 15–37)
BACTERIA URNS QL MICRO: NEGATIVE /HPF
BILIRUB SERPL-MCNC: 0.5 MG/DL (ref 0.2–1)
BILIRUB UR QL: NEGATIVE
BUN SERPL-MCNC: 26 MG/DL (ref 6–20)
BUN/CREAT SERPL: 8 (ref 12–20)
CALCIUM SERPL-MCNC: 9 MG/DL (ref 8.5–10.1)
CHLORIDE SERPL-SCNC: 116 MMOL/L (ref 97–108)
CO2 SERPL-SCNC: 22 MMOL/L (ref 21–32)
COLOR UR: ABNORMAL
CREAT SERPL-MCNC: 3.18 MG/DL (ref 0.55–1.02)
EPITH CASTS URNS QL MICRO: ABNORMAL /LPF
GLOBULIN SER CALC-MCNC: 3.1 G/DL (ref 2–4)
GLUCOSE SERPL-MCNC: 111 MG/DL (ref 65–100)
GLUCOSE UR STRIP.AUTO-MCNC: NEGATIVE MG/DL
HGB UR QL STRIP: NEGATIVE
HYALINE CASTS URNS QL MICRO: ABNORMAL /LPF (ref 0–5)
KETONES UR QL STRIP.AUTO: NEGATIVE MG/DL
LEUKOCYTE ESTERASE UR QL STRIP.AUTO: NEGATIVE
NITRITE UR QL STRIP.AUTO: NEGATIVE
PH UR STRIP: 6.5 (ref 5–8)
POTASSIUM SERPL-SCNC: 4.2 MMOL/L (ref 3.5–5.1)
PROT SERPL-MCNC: 5.9 G/DL (ref 6.4–8.2)
PROT UR STRIP-MCNC: ABNORMAL MG/DL
RBC #/AREA URNS HPF: ABNORMAL /HPF (ref 0–5)
SODIUM SERPL-SCNC: 145 MMOL/L (ref 136–145)
SP GR UR REFRACTOMETRY: 1.01 (ref 1–1.03)
SPECIMEN HOLD: NORMAL
UROBILINOGEN UR QL STRIP.AUTO: 0.2 EU/DL (ref 0.2–1)
WBC URNS QL MICRO: ABNORMAL /HPF (ref 0–4)

## 2024-01-08 ENCOUNTER — HOSPITAL ENCOUNTER (OUTPATIENT)
Facility: HOSPITAL | Age: 89
Discharge: HOME | End: 2024-01-11
Attending: INTERNAL MEDICINE
Payer: MEDICARE

## 2024-01-08 DIAGNOSIS — R93.89 ABNORMAL CHEST CT: ICD-10-CM

## 2024-01-08 PROCEDURE — 71250 CT THORAX DX C-: CPT

## 2024-02-06 RX ORDER — AMLODIPINE BESYLATE 2.5 MG/1
TABLET ORAL
Qty: 90 TABLET | Refills: 1 | Status: SHIPPED | OUTPATIENT
Start: 2024-02-06

## 2024-02-08 ENCOUNTER — TELEMEDICINE (OUTPATIENT)
Age: 89
End: 2024-02-08
Payer: MEDICARE

## 2024-02-08 ENCOUNTER — PATIENT MESSAGE (OUTPATIENT)
Age: 89
End: 2024-02-08

## 2024-02-08 DIAGNOSIS — R21 RASH: Primary | ICD-10-CM

## 2024-02-08 DIAGNOSIS — R21 RASH: ICD-10-CM

## 2024-02-08 DIAGNOSIS — Z87.440 HISTORY OF UTI: ICD-10-CM

## 2024-02-08 LAB
BASOPHILS # BLD: 0.1 K/UL (ref 0–0.1)
BASOPHILS NFR BLD: 1 % (ref 0–1)
DIFFERENTIAL METHOD BLD: ABNORMAL
EOSINOPHIL # BLD: 0.4 K/UL (ref 0–0.4)
EOSINOPHIL NFR BLD: 6 % (ref 0–7)
ERYTHROCYTE [DISTWIDTH] IN BLOOD BY AUTOMATED COUNT: 16.1 % (ref 11.5–14.5)
HCT VFR BLD AUTO: 36.8 % (ref 35–47)
HGB BLD-MCNC: 10.6 G/DL (ref 11.5–16)
IMM GRANULOCYTES # BLD AUTO: 0.1 K/UL (ref 0–0.04)
IMM GRANULOCYTES NFR BLD AUTO: 1 % (ref 0–0.5)
LYMPHOCYTES # BLD: 0.8 K/UL (ref 0.8–3.5)
LYMPHOCYTES NFR BLD: 13 % (ref 12–49)
MCH RBC QN AUTO: 29.7 PG (ref 26–34)
MCHC RBC AUTO-ENTMCNC: 28.8 G/DL (ref 30–36.5)
MCV RBC AUTO: 103.1 FL (ref 80–99)
MONOCYTES # BLD: 0.7 K/UL (ref 0–1)
MONOCYTES NFR BLD: 11 % (ref 5–13)
NEUTS SEG # BLD: 4.3 K/UL (ref 1.8–8)
NEUTS SEG NFR BLD: 68 % (ref 32–75)
NRBC # BLD: 0 K/UL (ref 0–0.01)
NRBC BLD-RTO: 0 PER 100 WBC
PLATELET # BLD AUTO: 204 K/UL (ref 150–400)
PMV BLD AUTO: 11.7 FL (ref 8.9–12.9)
RBC # BLD AUTO: 3.57 M/UL (ref 3.8–5.2)
RBC MORPH BLD: ABNORMAL
RBC MORPH BLD: ABNORMAL
WBC # BLD AUTO: 6.4 K/UL (ref 3.6–11)

## 2024-02-08 PROCEDURE — G8428 CUR MEDS NOT DOCUMENT: HCPCS | Performed by: NURSE PRACTITIONER

## 2024-02-08 PROCEDURE — 99213 OFFICE O/P EST LOW 20 MIN: CPT | Performed by: NURSE PRACTITIONER

## 2024-02-08 PROCEDURE — 1123F ACP DISCUSS/DSCN MKR DOCD: CPT | Performed by: NURSE PRACTITIONER

## 2024-02-08 PROCEDURE — 1090F PRES/ABSN URINE INCON ASSESS: CPT | Performed by: NURSE PRACTITIONER

## 2024-02-08 NOTE — PROGRESS NOTES
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    Menopause     LMP-?    Other and unspecified hyperlipidemia 4/4/2011    Rotator cuff tear 8/2011    right    S/P ERNESTINE-BSO 3/2/2012     Past Surgical History:   Procedure Laterality Date    CHEST SURGERY      Pacemaker    COLONOSCOPY  11/18/08    polyp (Brand)    HEENT  6/7/10    ROM O.D.    IR KYPHOPLASTY LUMBAR FIRST LEVEL  11/07/2023    IR KYPHOPLASTY LUMBAR FIRST LEVEL 11/7/2023 [facility] RAD ANGIO IR    KYPHOSIS SURGERY  11/07/2023    ORTHOPEDIC SURGERY  1992    R Elbow Fx - ORIF    SD UNLISTED PROCEDURE CARDIAC SURGERY  1996    Pacemaker Implant    SD UNLISTED PROCEDURE CARDIAC SURGERY  03/08    Pacemaker Battery Replacement    REMVL PERM PM PLS GEN W/REPL PLSE GEN 2 LEAD SYS  8/18/2017         ERNESTINE AND BSO (CERVIX REMOVED)  1975       Review of Systems   - per HPI    Objective:   No data recorded   General: alert, cooperative, and no distress   Mental  status: normal mood, behavior, speech, dress, motor activity, and thought processes, able to follow commands   Eyes: EOM intact, normal sclera   Mouth: mucous membranes moist   Neck: no visualized mass   Resp: normal effort and no respiratory distress   Neuro: no gross deficits   Musculoskeletal: normal ROM of neck   Skin: DERMATITIS NOTED: urticaria on both arms and thighs; erythema, no drainage or blisters   Psychiatric: normal affect, no hallucinations     Virginia B Wilbur Pace, was evaluated through a synchronous (real-time) audio-video encounter. The patient (or guardian if applicable) is aware that this is a billable service, which includes applicable co-pays. This Virtual Visit was conducted with patient's (and/or legal guardian's) consent. Patient identification was verified, and a caregiver was present when appropriate.   The patient was located at Home: [address] [place] [address]

## 2024-02-09 ENCOUNTER — TELEPHONE (OUTPATIENT)
Age: 89
End: 2024-02-09

## 2024-02-09 LAB
ALBUMIN SERPL-MCNC: 3.5 G/DL (ref 3.5–5)
ALBUMIN/GLOB SERPL: 1.3 (ref 1.1–2.2)
ALP SERPL-CCNC: 80 U/L (ref 45–117)
ALT SERPL-CCNC: 25 U/L (ref 12–78)
ANION GAP SERPL CALC-SCNC: 7 MMOL/L (ref 5–15)
AST SERPL-CCNC: 17 U/L (ref 15–37)
BILIRUB SERPL-MCNC: 0.4 MG/DL (ref 0.2–1)
BUN SERPL-MCNC: 26 MG/DL (ref 6–20)
BUN/CREAT SERPL: 11 (ref 12–20)
CALCIUM SERPL-MCNC: 9.2 MG/DL (ref 8.5–10.1)
CHLORIDE SERPL-SCNC: 113 MMOL/L (ref 97–108)
CO2 SERPL-SCNC: 23 MMOL/L (ref 21–32)
CREAT SERPL-MCNC: 2.27 MG/DL (ref 0.55–1.02)
GLOBULIN SER CALC-MCNC: 2.7 G/DL (ref 2–4)
GLUCOSE SERPL-MCNC: 148 MG/DL (ref 65–100)
POTASSIUM SERPL-SCNC: 4.7 MMOL/L (ref 3.5–5.1)
PROT SERPL-MCNC: 6.2 G/DL (ref 6.4–8.2)
SODIUM SERPL-SCNC: 143 MMOL/L (ref 136–145)

## 2024-02-09 RX ORDER — AMMONIUM LACTATE 12 G/100G
CREAM TOPICAL
Qty: 385 G | Refills: 4 | Status: SHIPPED | OUTPATIENT
Start: 2024-02-09 | End: 2024-03-10

## 2024-02-12 ENCOUNTER — TELEPHONE (OUTPATIENT)
Age: 89
End: 2024-02-12

## 2024-02-13 ENCOUNTER — OFFICE VISIT (OUTPATIENT)
Age: 89
End: 2024-02-13
Payer: MEDICARE

## 2024-02-13 VITALS
BODY MASS INDEX: 27 KG/M2 | TEMPERATURE: 97.3 F | HEART RATE: 94 BPM | DIASTOLIC BLOOD PRESSURE: 66 MMHG | RESPIRATION RATE: 18 BRPM | SYSTOLIC BLOOD PRESSURE: 122 MMHG | OXYGEN SATURATION: 98 % | HEIGHT: 68 IN

## 2024-02-13 DIAGNOSIS — R21 RASH: Primary | ICD-10-CM

## 2024-02-13 DIAGNOSIS — Z87.440 HISTORY OF UTI: ICD-10-CM

## 2024-02-13 PROCEDURE — 1036F TOBACCO NON-USER: CPT | Performed by: NURSE PRACTITIONER

## 2024-02-13 PROCEDURE — G8417 CALC BMI ABV UP PARAM F/U: HCPCS | Performed by: NURSE PRACTITIONER

## 2024-02-13 PROCEDURE — 99213 OFFICE O/P EST LOW 20 MIN: CPT | Performed by: NURSE PRACTITIONER

## 2024-02-13 PROCEDURE — G8427 DOCREV CUR MEDS BY ELIG CLIN: HCPCS | Performed by: NURSE PRACTITIONER

## 2024-02-13 PROCEDURE — 1090F PRES/ABSN URINE INCON ASSESS: CPT | Performed by: NURSE PRACTITIONER

## 2024-02-13 PROCEDURE — G8484 FLU IMMUNIZE NO ADMIN: HCPCS | Performed by: NURSE PRACTITIONER

## 2024-02-13 PROCEDURE — 1123F ACP DISCUSS/DSCN MKR DOCD: CPT | Performed by: NURSE PRACTITIONER

## 2024-02-13 RX ORDER — METHYLPREDNISOLONE 4 MG/1
TABLET ORAL
Qty: 21 TABLET | Refills: 0 | Status: SHIPPED | OUTPATIENT
Start: 2024-02-13 | End: 2024-02-18

## 2024-02-13 NOTE — PROGRESS NOTES
Kenya Fowler (:  1934) is a 89 y.o. female,here for evaluation of the following chief complaint(s):  Rash (Rash all over body for about two and half weeks/itchy)    /66   Pulse 94   Temp 97.3 °F (36.3 °C) (Temporal)   Resp 18   Ht 1.727 m (5' 8\")   SpO2 98%   BMI 27.00 kg/m²       SUBJECTIVE/OBJECTIVE:    HPI:Patient reports rash started on both wrists about 2.5 weeks ago. It quickly spread to thighs and neck. No change in soaps, foods, or meds. She does have a dog. Rash is very itchy. She started lachydrin last week and rash has gotten worse. She notes itching on torso, back, thighs, neck, chest, and arms.    Review of Systems   Skin:  Positive for rash.       Physical Exam  Constitutional:       Appearance: Normal appearance.   Skin:     Findings: Rash (erythematous patchy raised rash on both arms, neck, back; no blisters or vesicles) present.   Neurological:      General: No focal deficit present.      Mental Status: She is oriented to person, place, and time.   Psychiatric:         Mood and Affect: Mood normal.         Behavior: Behavior normal.          ASSESSMENT/PLAN:  1. Rash  -     External Referral To Dermatology  Also viewed by Dr. Scanlon  Oral steroid, monitor blood sugars  Refer to derm- [facility]  Follow-up if no improvement      --CATRINA Choe - NP

## 2024-02-13 NOTE — PROGRESS NOTES
Chief Complaint   Patient presents with    Rash     Rash all over body for about two and half weeks  itchy     Blood pressure 122/66, pulse 94, temperature 97.3 °F (36.3 °C), temperature source Temporal, resp. rate 18, height 1.727 m (5' 8\"), SpO2 98 %.

## 2024-02-14 LAB
APPEARANCE UR: CLEAR
BACTERIA URNS QL MICRO: NEGATIVE /HPF
BILIRUB UR QL: NEGATIVE
COLOR UR: NORMAL
EPITH CASTS URNS QL MICRO: NORMAL /LPF
GLUCOSE UR STRIP.AUTO-MCNC: NEGATIVE MG/DL
HGB UR QL STRIP: NEGATIVE
HYALINE CASTS URNS QL MICRO: NORMAL /LPF (ref 0–5)
KETONES UR QL STRIP.AUTO: NEGATIVE MG/DL
LEUKOCYTE ESTERASE UR QL STRIP.AUTO: NEGATIVE
NITRITE UR QL STRIP.AUTO: NEGATIVE
PH UR STRIP: 6.5 (ref 5–8)
PROT UR STRIP-MCNC: NEGATIVE MG/DL
RBC #/AREA URNS HPF: NORMAL /HPF (ref 0–5)
SP GR UR REFRACTOMETRY: 1.01 (ref 1–1.03)
URINE CULTURE IF INDICATED: NORMAL
UROBILINOGEN UR QL STRIP.AUTO: 0.2 EU/DL (ref 0.2–1)
WBC URNS QL MICRO: NORMAL /HPF (ref 0–4)

## 2024-02-19 ENCOUNTER — OFFICE VISIT (OUTPATIENT)
Age: 89
End: 2024-02-19
Payer: MEDICARE

## 2024-02-19 VITALS
HEIGHT: 68 IN | OXYGEN SATURATION: 98 % | BODY MASS INDEX: 25.76 KG/M2 | TEMPERATURE: 97.1 F | HEART RATE: 66 BPM | WEIGHT: 170 LBS | DIASTOLIC BLOOD PRESSURE: 80 MMHG | RESPIRATION RATE: 16 BRPM | SYSTOLIC BLOOD PRESSURE: 150 MMHG

## 2024-02-19 DIAGNOSIS — N18.30 STAGE 3 CHRONIC KIDNEY DISEASE, UNSPECIFIED WHETHER STAGE 3A OR 3B CKD (HCC): ICD-10-CM

## 2024-02-19 DIAGNOSIS — N28.9 ACUTE RENAL DISEASE: Primary | ICD-10-CM

## 2024-02-19 DIAGNOSIS — R26.81 GAIT INSTABILITY: ICD-10-CM

## 2024-02-19 DIAGNOSIS — E11.21 TYPE 2 DIABETES WITH NEPHROPATHY (HCC): ICD-10-CM

## 2024-02-19 DIAGNOSIS — I44.2 ATRIOVENTRICULAR BLOCK, COMPLETE (HCC): ICD-10-CM

## 2024-02-19 DIAGNOSIS — R91.8 LUNG MASS: ICD-10-CM

## 2024-02-19 DIAGNOSIS — F33.1 MAJOR DEPRESSIVE DISORDER, RECURRENT, MODERATE (HCC): ICD-10-CM

## 2024-02-19 PROBLEM — F10.20 ALCOHOL DEPENDENCE, UNCOMPLICATED (HCC): Status: RESOLVED | Noted: 2023-06-12 | Resolved: 2024-02-19

## 2024-02-19 PROCEDURE — G8484 FLU IMMUNIZE NO ADMIN: HCPCS | Performed by: INTERNAL MEDICINE

## 2024-02-19 PROCEDURE — G8427 DOCREV CUR MEDS BY ELIG CLIN: HCPCS | Performed by: INTERNAL MEDICINE

## 2024-02-19 PROCEDURE — 99214 OFFICE O/P EST MOD 30 MIN: CPT | Performed by: INTERNAL MEDICINE

## 2024-02-19 PROCEDURE — G8417 CALC BMI ABV UP PARAM F/U: HCPCS | Performed by: INTERNAL MEDICINE

## 2024-02-19 PROCEDURE — 1090F PRES/ABSN URINE INCON ASSESS: CPT | Performed by: INTERNAL MEDICINE

## 2024-02-19 PROCEDURE — 1123F ACP DISCUSS/DSCN MKR DOCD: CPT | Performed by: INTERNAL MEDICINE

## 2024-02-19 PROCEDURE — 1036F TOBACCO NON-USER: CPT | Performed by: INTERNAL MEDICINE

## 2024-02-19 RX ORDER — PREDNISONE 5 MG/1
TABLET ORAL
COMMUNITY
Start: 2024-02-13 | End: 2024-02-19

## 2024-02-19 RX ORDER — TRIAMCINOLONE ACETONIDE 1 MG/G
CREAM TOPICAL
COMMUNITY
Start: 2024-02-13

## 2024-02-19 RX ORDER — BLOOD-GLUCOSE METER
KIT MISCELLANEOUS
Qty: 1 KIT | Refills: 0 | Status: SHIPPED | OUTPATIENT
Start: 2024-02-19

## 2024-02-19 ASSESSMENT — PATIENT HEALTH QUESTIONNAIRE - PHQ9
4. FEELING TIRED OR HAVING LITTLE ENERGY: 0
3. TROUBLE FALLING OR STAYING ASLEEP: 0
7. TROUBLE CONCENTRATING ON THINGS, SUCH AS READING THE NEWSPAPER OR WATCHING TELEVISION: 0
1. LITTLE INTEREST OR PLEASURE IN DOING THINGS: 0
10. IF YOU CHECKED OFF ANY PROBLEMS, HOW DIFFICULT HAVE THESE PROBLEMS MADE IT FOR YOU TO DO YOUR WORK, TAKE CARE OF THINGS AT HOME, OR GET ALONG WITH OTHER PEOPLE: 0
SUM OF ALL RESPONSES TO PHQ QUESTIONS 1-9: 0
8. MOVING OR SPEAKING SO SLOWLY THAT OTHER PEOPLE COULD HAVE NOTICED. OR THE OPPOSITE, BEING SO FIGETY OR RESTLESS THAT YOU HAVE BEEN MOVING AROUND A LOT MORE THAN USUAL: 0
5. POOR APPETITE OR OVEREATING: 0
SUM OF ALL RESPONSES TO PHQ9 QUESTIONS 1 & 2: 0
6. FEELING BAD ABOUT YOURSELF - OR THAT YOU ARE A FAILURE OR HAVE LET YOURSELF OR YOUR FAMILY DOWN: 0
SUM OF ALL RESPONSES TO PHQ QUESTIONS 1-9: 0
SUM OF ALL RESPONSES TO PHQ QUESTIONS 1-9: 0
2. FEELING DOWN, DEPRESSED OR HOPELESS: 0
SUM OF ALL RESPONSES TO PHQ QUESTIONS 1-9: 0
9. THOUGHTS THAT YOU WOULD BE BETTER OFF DEAD, OR OF HURTING YOURSELF: 0

## 2024-02-19 NOTE — PROGRESS NOTES
Chief Complaint   Patient presents with    Follow-up     eviewed record in preparation for visit and have obtained necessary documentation.    Identified pt with two pt identifiers(name and ).      Health Maintenance Due   Topic    Respiratory Syncytial Virus (RSV) Pregnant or age 60 yrs+ (1 - 1-dose 60+ series)    Shingles vaccine (2 of 3)    COVID-19 Vaccine ( season)    Annual Wellness Visit (Medicare)          Chief Complaint   Patient presents with    Follow-up        Wt Readings from Last 3 Encounters:   24 77.1 kg (170 lb)   23 80.6 kg (177 lb 9.6 oz)   23 80.6 kg (177 lb 12.8 oz)     Temp Readings from Last 3 Encounters:   24 97.1 °F (36.2 °C) (Temporal)   24 97.3 °F (36.3 °C) (Temporal)   23 97.1 °F (36.2 °C) (Temporal)     BP Readings from Last 3 Encounters:   24 (!) 150/80   24 122/66   23 110/60     Pulse Readings from Last 3 Encounters:   24 66   24 94   23 62           Learning Assessment:  :    Failed to redirect to the Timeline version of the [employer] SmartLink.    Depression Screening:  :         No data to display                Fall Risk Assessment:  :    Failed to redirect to the Timeline version of the REVFS SmartLink.    Abuse Screening:  :         No data to display                Coordination of Care Questionnaire:  :    1) Have you been to an emergency room, urgent care clinic since your last visit? no   Hospitalized since your last visit? no             2) Have you seen or consulted any other health care providers outside of [facility] since your last visit? no  (Include any pap smears or colon screenings in this section.)    3) Do you have an Advance Directive on file? no    4) Are you interested in receiving information on Advance Directives? No

## 2024-02-19 NOTE — PROGRESS NOTES
Assessment/Plan:     1. 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  -renal function improving.  -will continue to monitor    2. Atrioventricular block, complete (HCC)  -with pacemaker  -follows with Dr. De Anda, cardiology    3. Major depressive disorder, recurrent, moderate (HCC)  -mood stable.  -living with daughter  -continue prozac 20mg daily. And remeron 15mg nightly.  No adjustments    4. Type 2 diabetes with nephropathy ([facility])  -eating better  -diabetes mellitus is diet controlled.   -requesting prescription for new glucometer    5. Stage 3 chronic kidney disease, unspecified whether stage 3a or 3b CKD ([facility])  -baseline GFR is 54-60.  Currently GFR is 20 due to interstitial kidney disease from use of prolonged augmentin    6. Gait instability  -sedentary  -uses walker for ambulatory assistance  -decreased stamina  -recommended PT.      7. Lung mass  -right lower lobe    -has follow up with Dr. SILVIA Herrera, pulmonology, in April, 2024.  Has surveillance CT done prior to her appointment.     8. Eczema  -improved with steroid taper  -seen by dermatologist who diagnosed rash as eczema.  Using triamcinolone cream    Orders Placed This Encounter                     glucose monitoring kit     Sig: Use daily to twice daily. E11.9     Dispense:  1 kit     Refill:  0        Follow-up Disposition:     Return in about 4 months (around 6/19/2024).               Subjective:      Kenya Fowler is a 89 y.o. female who presents today for follow up -     Since last visit :  -acute renal impairment - was placed on augmentin for post obstructive pneumonia and lung mass.  Stopped augmentin on 12/18/2023 as it was causing acute renal impairment, likely interstitial nephritus.   Renal function done on 2/8/2024 showing creatinine improved to 2.27 with improvement of her GFR from 13 to 20. Baseline GFR- 56-60 in the prior year.    -rash - seen on 2/13/2024 - given

## 2024-02-22 ENCOUNTER — PATIENT MESSAGE (OUTPATIENT)
Age: 89
End: 2024-02-22

## 2024-02-22 DIAGNOSIS — R26.81 GAIT INSTABILITY: Primary | ICD-10-CM

## 2024-02-22 NOTE — TELEPHONE ENCOUNTER
From: Kenya Fowler  To: Dr. Cinthya Scanlon  Sent: 2/22/2024 11:34 AM EST  Subject: Physical Therapy    Dr. Scanlon,  During my mother's visit on Monday we discussed an order for PT. You were going for to send an order to [facility] for PT. We would very much like to continue working with the therapist from [facility]. Her name is Leigh Ann Horowitz and she does a great job with my mother. Would it be possible for you to fax the order to [facility] with a request for Leigh Ann Horowitz? (That is what Leigh Ann suggested to include on the fax.) The fax number is: [phone].  Thank you,  Irma

## 2024-03-07 DIAGNOSIS — F33.1 MAJOR DEPRESSIVE DISORDER, RECURRENT, MODERATE (HCC): ICD-10-CM

## 2024-03-07 RX ORDER — MIRTAZAPINE 15 MG/1
15 TABLET, FILM COATED ORAL NIGHTLY
Qty: 90 TABLET | Refills: 0 | Status: SHIPPED | OUTPATIENT
Start: 2024-03-07

## 2024-03-07 RX ORDER — FLUOXETINE HYDROCHLORIDE 20 MG/1
20 CAPSULE ORAL DAILY
Qty: 90 CAPSULE | Refills: 0 | Status: SHIPPED | OUTPATIENT
Start: 2024-03-07

## 2024-03-07 NOTE — TELEPHONE ENCOUNTER
Pt last seen on 02/19/2024  Has appt on 07/01/2024        Rx sent to pharmacy as previously filled and verified by Verbal Order Read Back with provider.

## 2024-03-07 NOTE — TELEPHONE ENCOUNTER
Pt last seen 02/19/2024    Has appt on 07/01/2024    Rx last filled on  10/08/2023    Rx sent to pharmacy as previously filled and verified by Verbal Order Read Back with provider.      You may take Tylenol (acetaminophen) with the medications that are prescribed for you, if you are permitted to take these medications. Please follow package directions for the appropriate dosing and frequency. Continue your throat lozenges and cough medicine over-the-counter as previously taken.

## 2024-03-25 ENCOUNTER — TELEPHONE (OUTPATIENT)
Age: 89
End: 2024-03-25

## 2024-03-25 NOTE — TELEPHONE ENCOUNTER
----- Message from Nu Love sent at 3/25/2024 11:33 AM EDT -----  Subject: Message to Provider    QUESTIONS  Information for Provider? [facility] is   requesting a referral for Physical Therapy for the patient The fax number   [phone]. The telephone number is [phone].  ---------------------------------------------------------------------------  --------------  CALL BACK INFO  [phone]; OK to leave message on voicemail  ---------------------------------------------------------------------------  --------------  SCRIPT ANSWERS  Relationship to Patient? Covered Entity  Covered Entity Type? Home Health Care?  Representative Name? El

## 2024-03-26 ENCOUNTER — TELEPHONE (OUTPATIENT)
Age: 89
End: 2024-03-26

## 2024-03-26 NOTE — TELEPHONE ENCOUNTER
Reason for call:  TC from Rosalva rodas/Kathy LÓPEZ. Rosalva states she received a voicemail from Liz re a verbal order. Rosalva states since this is a new start with pt, she is unable to take a verbal order over the phone. Rosalva states that she will need a written order. Rosalva states she also needs the last office notes, 02/19/2024,too. Rosalva states the written order and last office notes can be faxed to her attn at fax number: [phone].     Is this a new problem: Yes    Date of last appointment:  2/19/2024     Can we respond via [employer]ioust: No    Best call back number: Rosalva/[facility]/Fax Number: [phone]

## 2024-03-26 NOTE — TELEPHONE ENCOUNTER
Returned call to [facility].  Left message on voicemail as directed   ex #[phone] for verbal order for PT.

## 2024-04-29 ENCOUNTER — HOSPITAL ENCOUNTER (OUTPATIENT)
Facility: HOSPITAL | Age: 89
Discharge: HOME | End: 2024-05-02
Attending: INTERNAL MEDICINE
Payer: MEDICARE

## 2024-04-29 DIAGNOSIS — J85.1 ABSCESS OF RIGHT LUNG WITH PNEUMONIA, UNSPECIFIED PART OF LUNG (HCC): ICD-10-CM

## 2024-04-29 PROCEDURE — 71250 CT THORAX DX C-: CPT

## 2024-06-03 ENCOUNTER — TELEPHONE (OUTPATIENT)
Age: 89
End: 2024-06-03

## 2024-06-03 NOTE — TELEPHONE ENCOUNTER
----- Message from Laurita Colindres sent at 6/3/2024 11:11 AM EDT -----  Subject: Message to Provider    QUESTIONS  Information for Provider? Patients daughter would like to discuss Home   health and visits. please call to discuss  ---------------------------------------------------------------------------  --------------  CALL BACK INFO  [phone]; OK to leave message on voicemail  ---------------------------------------------------------------------------  --------------  SCRIPT ANSWERS  Relationship to Patient? Self

## 2024-06-05 ENCOUNTER — OFFICE VISIT (OUTPATIENT)
Age: 89
End: 2024-06-05
Payer: MEDICARE

## 2024-06-05 VITALS
DIASTOLIC BLOOD PRESSURE: 70 MMHG | OXYGEN SATURATION: 95 % | SYSTOLIC BLOOD PRESSURE: 116 MMHG | TEMPERATURE: 98.5 F | HEART RATE: 105 BPM | HEIGHT: 68 IN | RESPIRATION RATE: 16 BRPM | WEIGHT: 180.6 LBS | BODY MASS INDEX: 27.37 KG/M2

## 2024-06-05 DIAGNOSIS — S81.802A TRAUMATIC OPEN WOUND OF LEFT LOWER LEG WITH INFECTION, INITIAL ENCOUNTER: Primary | ICD-10-CM

## 2024-06-05 DIAGNOSIS — L08.9 TRAUMATIC OPEN WOUND OF LEFT LOWER LEG WITH INFECTION, INITIAL ENCOUNTER: Primary | ICD-10-CM

## 2024-06-05 DIAGNOSIS — F33.1 MAJOR DEPRESSIVE DISORDER, RECURRENT, MODERATE (HCC): ICD-10-CM

## 2024-06-05 PROCEDURE — 99213 OFFICE O/P EST LOW 20 MIN: CPT | Performed by: NURSE PRACTITIONER

## 2024-06-05 PROCEDURE — 1123F ACP DISCUSS/DSCN MKR DOCD: CPT | Performed by: NURSE PRACTITIONER

## 2024-06-05 PROCEDURE — G8427 DOCREV CUR MEDS BY ELIG CLIN: HCPCS | Performed by: NURSE PRACTITIONER

## 2024-06-05 PROCEDURE — 1036F TOBACCO NON-USER: CPT | Performed by: NURSE PRACTITIONER

## 2024-06-05 PROCEDURE — G8417 CALC BMI ABV UP PARAM F/U: HCPCS | Performed by: NURSE PRACTITIONER

## 2024-06-05 PROCEDURE — 1090F PRES/ABSN URINE INCON ASSESS: CPT | Performed by: NURSE PRACTITIONER

## 2024-06-05 RX ORDER — MIRTAZAPINE 15 MG/1
15 TABLET, FILM COATED ORAL NIGHTLY
Qty: 90 TABLET | Refills: 0 | Status: SHIPPED | OUTPATIENT
Start: 2024-06-05

## 2024-06-05 RX ORDER — FLUOXETINE HYDROCHLORIDE 20 MG/1
20 CAPSULE ORAL DAILY
Qty: 90 CAPSULE | Refills: 0 | Status: SHIPPED | OUTPATIENT
Start: 2024-06-05

## 2024-06-05 ASSESSMENT — PATIENT HEALTH QUESTIONNAIRE - PHQ9
2. FEELING DOWN, DEPRESSED OR HOPELESS: NOT AT ALL
6. FEELING BAD ABOUT YOURSELF - OR THAT YOU ARE A FAILURE OR HAVE LET YOURSELF OR YOUR FAMILY DOWN: NOT AT ALL
3. TROUBLE FALLING OR STAYING ASLEEP: NOT AT ALL
7. TROUBLE CONCENTRATING ON THINGS, SUCH AS READING THE NEWSPAPER OR WATCHING TELEVISION: NOT AT ALL
SUM OF ALL RESPONSES TO PHQ9 QUESTIONS 1 & 2: 0
4. FEELING TIRED OR HAVING LITTLE ENERGY: NOT AT ALL
SUM OF ALL RESPONSES TO PHQ QUESTIONS 1-9: 0
5. POOR APPETITE OR OVEREATING: NOT AT ALL
SUM OF ALL RESPONSES TO PHQ QUESTIONS 1-9: 0
9. THOUGHTS THAT YOU WOULD BE BETTER OFF DEAD, OR OF HURTING YOURSELF: NOT AT ALL
10. IF YOU CHECKED OFF ANY PROBLEMS, HOW DIFFICULT HAVE THESE PROBLEMS MADE IT FOR YOU TO DO YOUR WORK, TAKE CARE OF THINGS AT HOME, OR GET ALONG WITH OTHER PEOPLE: NOT DIFFICULT AT ALL
SUM OF ALL RESPONSES TO PHQ QUESTIONS 1-9: 0
8. MOVING OR SPEAKING SO SLOWLY THAT OTHER PEOPLE COULD HAVE NOTICED. OR THE OPPOSITE, BEING SO FIGETY OR RESTLESS THAT YOU HAVE BEEN MOVING AROUND A LOT MORE THAN USUAL: NOT AT ALL
SUM OF ALL RESPONSES TO PHQ QUESTIONS 1-9: 0
1. LITTLE INTEREST OR PLEASURE IN DOING THINGS: NOT AT ALL

## 2024-06-05 NOTE — PROGRESS NOTES
Kenya Fowler (:  1934) is a 89 y.o. female,here for evaluation of the following chief complaint(s):  Follow-up (F/u dog scratch /Last Friday )    /70   Pulse (!) 105   Temp 98.5 °F (36.9 °C) (Oral)   Resp 16   Ht 1.727 m (5' 8\")   Wt 81.9 kg (180 lb 9.6 oz)   SpO2 95%   BMI 27.46 kg/m²       SUBJECTIVE/OBJECTIVE:    HPI:Patient reports dog scratched her left lower leg on Friday. She went to urgent care and it was cleaned well. She was given tetanus vaccine.  She was given Keflex at urgent care but hasn't started it yet. She has been keeping it covered with bacitracin since Friday. She notes increased redness today.    Review of Systems   Skin:  Positive for wound.       Physical Exam  Skin:     Comments: Wound as pictured on left shin; bloody drainage noted with flap of skin; erythema and mild warmth noted          ASSESSMENT/PLAN:  1. Traumatic open wound of left lower leg with infection, initial encounter  2. Major depressive disorder, recurrent, moderate (HCC)  -     FLUoxetine (PROZAC) 20 MG capsule; Take 1 capsule by mouth daily, Disp-90 capsule, R-0Normal  Keep area clean  Try to elevate and keep open to air during the day  Start keflex as prescribed  Follow-up in 1 week    --CATRINA Choe - NP

## 2024-06-12 NOTE — PROGRESS NOTES
Assessment/Plan:     1. Type 2 diabetes with nephropathy (HCC)  -diet controlled.   -wound in left lower leg slow healing.     - CBC with Auto Differential; Future  - Comprehensive Metabolic Panel; Future  - Hemoglobin A1C; Future    2. Major depressive disorder, recurrent, moderate (HCC)  -mood stable with use of prozac 20mg daily and remeron 15mg qhs    3. Stage 4 renal impairment.   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  -check renal function today.     4. Traumatic open wound of left lower leg with infection, initial encounter  -still some erythema around wound extending towards her ankle. Completed her 7 days of keflex yesterday.   -will extend course of antibiotics - given levaquin 500mg daily for 1 day, then 250mg daily for 6 days.     5. Atrioventricular block, complete (HCC)  -with pacemaker.  Following with cardiology    Orders Placed This Encounter    CBC with Auto Differential     Standing Status:   Future     Number of Occurrences:   1     Standing Expiration Date:   6/13/2025    Comprehensive Metabolic Panel     Standing Status:   Future     Number of Occurrences:   1     Standing Expiration Date:   6/13/2025    Hemoglobin A1C     Standing Status:   Future     Number of Occurrences:   1     Standing Expiration Date:   6/13/2025    levoFLOXacin (LEVAQUIN) 500 MG tablet     Sig: Take 1 tablet by mouth daily for 7 days     Dispense:  7 tablet     Refill:  0             Has established follow up in 2 weeks.                   Subjective:      Kenya Fowler is a 89 y.o. female who presents today for follow up :    Since last visit :  -saw Lenny Yang NP on 6/5/2024 for wound on left lower leg.  Was scratched by her dog. Initially went to urgent care and given keflex, but had noted increase redness around the wound.  She had not started on keflex.     -CRI - acute on chronic from augmentin use in 12/2023. - interstitial nephritis.     -lung lesion - following with Dr. VEGA

## 2024-06-13 ENCOUNTER — OFFICE VISIT (OUTPATIENT)
Age: 89
End: 2024-06-13
Payer: MEDICARE

## 2024-06-13 VITALS
TEMPERATURE: 97.1 F | RESPIRATION RATE: 16 BRPM | SYSTOLIC BLOOD PRESSURE: 148 MMHG | DIASTOLIC BLOOD PRESSURE: 81 MMHG | HEIGHT: 68 IN | HEART RATE: 78 BPM | WEIGHT: 180.4 LBS | BODY MASS INDEX: 27.34 KG/M2 | OXYGEN SATURATION: 94 %

## 2024-06-13 DIAGNOSIS — F33.1 MAJOR DEPRESSIVE DISORDER, RECURRENT, MODERATE (HCC): ICD-10-CM

## 2024-06-13 DIAGNOSIS — E11.21 TYPE 2 DIABETES WITH NEPHROPATHY (HCC): ICD-10-CM

## 2024-06-13 DIAGNOSIS — E11.21 TYPE 2 DIABETES WITH NEPHROPATHY (HCC): Primary | ICD-10-CM

## 2024-06-13 DIAGNOSIS — N18.4 CHRONIC RENAL IMPAIRMENT, STAGE 4 (SEVERE) (HCC): ICD-10-CM

## 2024-06-13 DIAGNOSIS — I44.2 ATRIOVENTRICULAR BLOCK, COMPLETE (HCC): ICD-10-CM

## 2024-06-13 DIAGNOSIS — L08.9 TRAUMATIC OPEN WOUND OF LEFT LOWER LEG WITH INFECTION, INITIAL ENCOUNTER: ICD-10-CM

## 2024-06-13 DIAGNOSIS — S81.802A TRAUMATIC OPEN WOUND OF LEFT LOWER LEG WITH INFECTION, INITIAL ENCOUNTER: ICD-10-CM

## 2024-06-13 PROCEDURE — 99214 OFFICE O/P EST MOD 30 MIN: CPT | Performed by: INTERNAL MEDICINE

## 2024-06-13 PROCEDURE — G8427 DOCREV CUR MEDS BY ELIG CLIN: HCPCS | Performed by: INTERNAL MEDICINE

## 2024-06-13 PROCEDURE — 1123F ACP DISCUSS/DSCN MKR DOCD: CPT | Performed by: INTERNAL MEDICINE

## 2024-06-13 PROCEDURE — G8417 CALC BMI ABV UP PARAM F/U: HCPCS | Performed by: INTERNAL MEDICINE

## 2024-06-13 PROCEDURE — 1036F TOBACCO NON-USER: CPT | Performed by: INTERNAL MEDICINE

## 2024-06-13 PROCEDURE — 1090F PRES/ABSN URINE INCON ASSESS: CPT | Performed by: INTERNAL MEDICINE

## 2024-06-13 RX ORDER — LEVOFLOXACIN 250 MG/1
TABLET, FILM COATED ORAL
Qty: 8 TABLET | Refills: 0 | Status: SHIPPED | OUTPATIENT
Start: 2024-06-13

## 2024-06-13 RX ORDER — LEVOFLOXACIN 500 MG/1
500 TABLET, FILM COATED ORAL DAILY
Qty: 7 TABLET | Refills: 0 | Status: SHIPPED | OUTPATIENT
Start: 2024-06-13 | End: 2024-06-13

## 2024-06-13 ASSESSMENT — PATIENT HEALTH QUESTIONNAIRE - PHQ9
SUM OF ALL RESPONSES TO PHQ9 QUESTIONS 1 & 2: 0
SUM OF ALL RESPONSES TO PHQ QUESTIONS 1-9: 0
SUM OF ALL RESPONSES TO PHQ QUESTIONS 1-9: 0
1. LITTLE INTEREST OR PLEASURE IN DOING THINGS: NOT AT ALL
SUM OF ALL RESPONSES TO PHQ QUESTIONS 1-9: 0
SUM OF ALL RESPONSES TO PHQ QUESTIONS 1-9: 0
2. FEELING DOWN, DEPRESSED OR HOPELESS: NOT AT ALL

## 2024-06-14 ENCOUNTER — TELEPHONE (OUTPATIENT)
Age: 89
End: 2024-06-14

## 2024-06-14 LAB
ALBUMIN SERPL-MCNC: 3.7 G/DL (ref 3.5–5)
ALBUMIN/GLOB SERPL: 1.2 (ref 1.1–2.2)
ALP SERPL-CCNC: 80 U/L (ref 45–117)
ALT SERPL-CCNC: 16 U/L (ref 12–78)
ANION GAP SERPL CALC-SCNC: 5 MMOL/L (ref 5–15)
AST SERPL-CCNC: 11 U/L (ref 15–37)
BASOPHILS # BLD: 0.1 K/UL (ref 0–0.1)
BASOPHILS NFR BLD: 1 % (ref 0–1)
BILIRUB SERPL-MCNC: 0.4 MG/DL (ref 0.2–1)
BUN SERPL-MCNC: 36 MG/DL (ref 6–20)
BUN/CREAT SERPL: 27 (ref 12–20)
CALCIUM SERPL-MCNC: 9.7 MG/DL (ref 8.5–10.1)
CHLORIDE SERPL-SCNC: 111 MMOL/L (ref 97–108)
CO2 SERPL-SCNC: 25 MMOL/L (ref 21–32)
CREAT SERPL-MCNC: 1.33 MG/DL (ref 0.55–1.02)
DIFFERENTIAL METHOD BLD: NORMAL
EOSINOPHIL # BLD: 0.2 K/UL (ref 0–0.4)
EOSINOPHIL NFR BLD: 3 % (ref 0–7)
ERYTHROCYTE [DISTWIDTH] IN BLOOD BY AUTOMATED COUNT: 13.5 % (ref 11.5–14.5)
EST. AVERAGE GLUCOSE BLD GHB EST-MCNC: 103 MG/DL
GLOBULIN SER CALC-MCNC: 3 G/DL (ref 2–4)
GLUCOSE SERPL-MCNC: 96 MG/DL (ref 65–100)
HBA1C MFR BLD: 5.2 % (ref 4–5.6)
HCT VFR BLD AUTO: 38.5 % (ref 35–47)
HGB BLD-MCNC: 12 G/DL (ref 11.5–16)
IMM GRANULOCYTES # BLD AUTO: 0 K/UL (ref 0–0.04)
IMM GRANULOCYTES NFR BLD AUTO: 0 % (ref 0–0.5)
LYMPHOCYTES # BLD: 0.9 K/UL (ref 0.8–3.5)
LYMPHOCYTES NFR BLD: 15 % (ref 12–49)
MCH RBC QN AUTO: 29.7 PG (ref 26–34)
MCHC RBC AUTO-ENTMCNC: 31.2 G/DL (ref 30–36.5)
MCV RBC AUTO: 95.3 FL (ref 80–99)
MONOCYTES # BLD: 0.7 K/UL (ref 0–1)
MONOCYTES NFR BLD: 12 % (ref 5–13)
NEUTS SEG # BLD: 4.1 K/UL (ref 1.8–8)
NEUTS SEG NFR BLD: 69 % (ref 32–75)
NRBC # BLD: 0 K/UL (ref 0–0.01)
NRBC BLD-RTO: 0 PER 100 WBC
PLATELET # BLD AUTO: 218 K/UL (ref 150–400)
PMV BLD AUTO: 11.1 FL (ref 8.9–12.9)
POTASSIUM SERPL-SCNC: 5.2 MMOL/L (ref 3.5–5.1)
PROT SERPL-MCNC: 6.7 G/DL (ref 6.4–8.2)
RBC # BLD AUTO: 4.04 M/UL (ref 3.8–5.2)
SODIUM SERPL-SCNC: 141 MMOL/L (ref 136–145)
WBC # BLD AUTO: 6 K/UL (ref 3.6–11)

## 2024-06-14 NOTE — TELEPHONE ENCOUNTER
Reason for call:  Spoke with pharmacies.the need clarification on the order for       evoFLOXacin (LEVAQUIN) 250 MG tablet   Pharmacies requested a call back.    Is this a new problem: Yes    Date of last appointment:  6/13/2024     Can we respond via [employer]: No    Best call back number: [id number] [place], [place] - [address] - P [phone] - F [phone] (Pharmacy)

## 2024-07-08 ENCOUNTER — OFFICE VISIT (OUTPATIENT)
Age: 89
End: 2024-07-08
Payer: MEDICARE

## 2024-07-08 VITALS
SYSTOLIC BLOOD PRESSURE: 130 MMHG | DIASTOLIC BLOOD PRESSURE: 74 MMHG | HEART RATE: 82 BPM | WEIGHT: 182.4 LBS | TEMPERATURE: 97.5 F | BODY MASS INDEX: 27.73 KG/M2 | RESPIRATION RATE: 16 BRPM | OXYGEN SATURATION: 96 %

## 2024-07-08 DIAGNOSIS — S81.802D WOUND OF LEFT LOWER EXTREMITY, SUBSEQUENT ENCOUNTER: Primary | ICD-10-CM

## 2024-07-08 PROCEDURE — G8417 CALC BMI ABV UP PARAM F/U: HCPCS | Performed by: INTERNAL MEDICINE

## 2024-07-08 PROCEDURE — 1123F ACP DISCUSS/DSCN MKR DOCD: CPT | Performed by: INTERNAL MEDICINE

## 2024-07-08 PROCEDURE — 99213 OFFICE O/P EST LOW 20 MIN: CPT | Performed by: INTERNAL MEDICINE

## 2024-07-08 PROCEDURE — 1036F TOBACCO NON-USER: CPT | Performed by: INTERNAL MEDICINE

## 2024-07-08 PROCEDURE — 1090F PRES/ABSN URINE INCON ASSESS: CPT | Performed by: INTERNAL MEDICINE

## 2024-07-08 PROCEDURE — G8428 CUR MEDS NOT DOCUMENT: HCPCS | Performed by: INTERNAL MEDICINE

## 2024-07-08 NOTE — PROGRESS NOTES
Chief Complaint   Patient presents with    Follow-up     eviewed record in preparation for visit and have obtained necessary documentation.    Identified pt with two pt identifiers(name and ).      Health Maintenance Due   Topic    Respiratory Syncytial Virus (RSV) Pregnant or age 60 yrs+ (1 - 1-dose 60+ series)    Shingles vaccine (2 of 3)    COVID-19 Vaccine ( season)    Annual Wellness Visit (Medicare)          Chief Complaint   Patient presents with    Follow-up        Wt Readings from Last 3 Encounters:   24 82.7 kg (182 lb 6.4 oz)   24 81.8 kg (180 lb 6.4 oz)   24 81.9 kg (180 lb 9.6 oz)     Temp Readings from Last 3 Encounters:   24 97.5 °F (36.4 °C) (Temporal)   24 97.1 °F (36.2 °C) (Temporal)   24 98.5 °F (36.9 °C) (Oral)     BP Readings from Last 3 Encounters:   24 130/74   24 (!) 148/81   24 116/70     Pulse Readings from Last 3 Encounters:   24 82   24 78   24 (!) 105           Learning Assessment:  :    Failed to redirect to the Timeline version of the [email or web address] SmartLink.    Depression Screening:  :         No data to display                Fall Risk Assessment:  :    Failed to redirect to the Timeline version of the [email or web address] SmartLink.    Abuse Screening:  :         No data to display                Coordination of Care Questionnaire:  :    1) Have you been to an emergency room, urgent care clinic since your last visit? no   Hospitalized since your last visit? no             2) Have you seen or consulted any other health care providers outside of [facility] since your last visit? no  (Include any pap smears or colon screenings in this section.)    3) Do you have an Advance Directive on file? no    4) Are you interested in receiving information on Advance Directives? No

## 2024-07-08 NOTE — PROGRESS NOTES
Assessment/Plan:     1. Wound of left lower extremity, subsequent encounter  -continue to keep area clean and dry  -well healed with scab formation.  -avoid scratching           Follow-up Disposition:     Return in about 4 months (around 11/8/2024) for follow up.               Subjective:      Kenya Fowler is a 89 y.o. female who presents today for follow up of her left lower leg wound.    Since last visit :  Left lower leg wound healing well.  Much improved after second round of antibiotics    -in early June, had episode of elevated bp.  Self resolved by afternoon.  No new supplements.  She reports having bad dream and some nausea.  No recurrence           Objective:     Wt Readings from Last 3 Encounters:   07/08/24 82.7 kg (182 lb 6.4 oz)   06/13/24 81.8 kg (180 lb 6.4 oz)   06/05/24 81.9 kg (180 lb 9.6 oz)     BP Readings from Last 3 Encounters:   07/08/24 130/74   06/13/24 (!) 148/81   06/05/24 116/70     /74   Pulse 82   Temp 97.5 °F (36.4 °C) (Temporal)   Resp 16   Wt 82.7 kg (182 lb 6.4 oz)   SpO2 96%   BMI 27.73 kg/m²   General appearance: alert, appears stated age, and cooperative  Head: Normocephalic, without obvious abnormality, atraumatic  Skin:  left lower leg, there is a large, well healed and scabbed lesion. No erythema. No bleeding. No discharge

## 2024-07-23 ENCOUNTER — TELEPHONE (OUTPATIENT)
Age: 89
End: 2024-07-23

## 2024-07-23 PROCEDURE — 93294 REM INTERROG EVL PM/LDLS PM: CPT | Performed by: INTERNAL MEDICINE

## 2024-07-23 NOTE — TELEPHONE ENCOUNTER
Returned call to daughter. ID verified using two patient identifiers. Pt and daughter able to send remote transmission while on phone. Transmission received and no events noted. Encouraged to keep BP log for complaints of dizziness. Pt and daughter deny need for follow up appointment. See docket in [facility].    Opportunities for questions, clarifications, and concerns provided.  Pt expressed understanding.

## 2024-07-23 NOTE — TELEPHONE ENCOUNTER
Patients daughter called in stating that mom has been feeling a little dizzy today and they want to see if they can have her pacemaker checked to see if they see anything out of the norm.      Phone [phone]

## 2024-09-04 DIAGNOSIS — F33.1 MAJOR DEPRESSIVE DISORDER, RECURRENT, MODERATE (HCC): ICD-10-CM

## 2024-09-08 RX ORDER — MIRTAZAPINE 15 MG/1
15 TABLET, FILM COATED ORAL NIGHTLY
Qty: 90 TABLET | Refills: 0 | Status: SHIPPED | OUTPATIENT
Start: 2024-09-08

## 2024-09-27 ENCOUNTER — TELEPHONE (OUTPATIENT)
Age: 89
End: 2024-09-27

## 2024-09-27 ENCOUNTER — TELEMEDICINE (OUTPATIENT)
Age: 89
End: 2024-09-27
Payer: MEDICARE

## 2024-09-27 DIAGNOSIS — R42 DIZZINESS: ICD-10-CM

## 2024-09-27 DIAGNOSIS — R11.0 NAUSEA: Primary | ICD-10-CM

## 2024-09-27 PROCEDURE — 99213 OFFICE O/P EST LOW 20 MIN: CPT | Performed by: NURSE PRACTITIONER

## 2024-09-27 PROCEDURE — 1090F PRES/ABSN URINE INCON ASSESS: CPT | Performed by: NURSE PRACTITIONER

## 2024-09-27 PROCEDURE — G8427 DOCREV CUR MEDS BY ELIG CLIN: HCPCS | Performed by: NURSE PRACTITIONER

## 2024-09-27 PROCEDURE — 1123F ACP DISCUSS/DSCN MKR DOCD: CPT | Performed by: NURSE PRACTITIONER

## 2024-09-27 RX ORDER — ONDANSETRON 4 MG/1
4 TABLET, ORALLY DISINTEGRATING ORAL 3 TIMES DAILY PRN
Qty: 30 TABLET | Refills: 0 | Status: SHIPPED | OUTPATIENT
Start: 2024-09-27

## 2024-11-11 ENCOUNTER — OFFICE VISIT (OUTPATIENT)
Age: 89
End: 2024-11-11
Payer: MEDICARE

## 2024-11-11 VITALS
HEIGHT: 68 IN | BODY MASS INDEX: 27.92 KG/M2 | WEIGHT: 184.2 LBS | HEART RATE: 92 BPM | SYSTOLIC BLOOD PRESSURE: 136 MMHG | DIASTOLIC BLOOD PRESSURE: 70 MMHG | TEMPERATURE: 97.5 F | RESPIRATION RATE: 16 BRPM | OXYGEN SATURATION: 93 %

## 2024-11-11 DIAGNOSIS — E11.21 TYPE 2 DIABETES WITH NEPHROPATHY (HCC): ICD-10-CM

## 2024-11-11 DIAGNOSIS — R01.1 MURMUR, CARDIAC: ICD-10-CM

## 2024-11-11 DIAGNOSIS — E11.21 TYPE 2 DIABETES WITH NEPHROPATHY (HCC): Primary | ICD-10-CM

## 2024-11-11 DIAGNOSIS — F33.1 MAJOR DEPRESSIVE DISORDER, RECURRENT, MODERATE (HCC): ICD-10-CM

## 2024-11-11 DIAGNOSIS — N18.4 CHRONIC RENAL IMPAIRMENT, STAGE 4 (SEVERE) (HCC): ICD-10-CM

## 2024-11-11 DIAGNOSIS — R42 VERTIGO: ICD-10-CM

## 2024-11-11 DIAGNOSIS — Z79.899 ENCOUNTER FOR LONG-TERM (CURRENT) USE OF MEDICATIONS: ICD-10-CM

## 2024-11-11 LAB
ALBUMIN SERPL-MCNC: 3.8 G/DL (ref 3.5–5)
ALBUMIN/GLOB SERPL: 1.2 (ref 1.1–2.2)
ALP SERPL-CCNC: 77 U/L (ref 45–117)
ALT SERPL-CCNC: 15 U/L (ref 12–78)
ANION GAP SERPL CALC-SCNC: 6 MMOL/L (ref 2–12)
AST SERPL-CCNC: 12 U/L (ref 15–37)
BILIRUB SERPL-MCNC: 0.4 MG/DL (ref 0.2–1)
BUN SERPL-MCNC: 31 MG/DL (ref 6–20)
BUN/CREAT SERPL: 22 (ref 12–20)
CALCIUM SERPL-MCNC: 9.8 MG/DL (ref 8.5–10.1)
CHLORIDE SERPL-SCNC: 108 MMOL/L (ref 97–108)
CO2 SERPL-SCNC: 26 MMOL/L (ref 21–32)
CREAT SERPL-MCNC: 1.42 MG/DL (ref 0.55–1.02)
EST. AVERAGE GLUCOSE BLD GHB EST-MCNC: 123 MG/DL
GLOBULIN SER CALC-MCNC: 3.1 G/DL (ref 2–4)
GLUCOSE SERPL-MCNC: 106 MG/DL (ref 65–100)
HBA1C MFR BLD: 5.9 % (ref 4–5.6)
POTASSIUM SERPL-SCNC: 4.7 MMOL/L (ref 3.5–5.1)
PROT SERPL-MCNC: 6.9 G/DL (ref 6.4–8.2)
SODIUM SERPL-SCNC: 140 MMOL/L (ref 136–145)

## 2024-11-11 PROCEDURE — 99215 OFFICE O/P EST HI 40 MIN: CPT | Performed by: INTERNAL MEDICINE

## 2024-11-11 RX ORDER — ACYCLOVIR 400 MG/1
TABLET ORAL
Qty: 1 EACH | Refills: 0 | Status: SHIPPED | COMMUNITY
Start: 2024-11-11

## 2024-11-11 NOTE — PROGRESS NOTES
Chief Complaint   Patient presents with    Follow-up     eviewed record in preparation for visit and have obtained necessary documentation.    Identified pt with two pt identifiers(name and ).      Health Maintenance Due   Topic    Respiratory Syncytial Virus (RSV) Pregnant or age 60 yrs+ (1 - 1-dose 60+ series)    Annual Wellness Visit (Medicare)     Shingles vaccine (2 of 3)    Flu vaccine (1)    COVID-19 Vaccine ( season)         Chief Complaint   Patient presents with    Follow-up        Wt Readings from Last 3 Encounters:   24 83.6 kg (184 lb 3.2 oz)   24 82.7 kg (182 lb 6.4 oz)   24 81.8 kg (180 lb 6.4 oz)     Temp Readings from Last 3 Encounters:   24 97.5 °F (36.4 °C) (Temporal)   24 97.5 °F (36.4 °C) (Temporal)   24 97.1 °F (36.2 °C) (Temporal)     BP Readings from Last 3 Encounters:   24 136/70   24 130/74   24 (!) 148/81     Pulse Readings from Last 3 Encounters:   24 92   24 82   24 78           Learning Assessment:  :    Failed to redirect to the Timeline version of the [employer] SmartLink.    Depression Screening:  :         No data to display                Fall Risk Assessment:  :    Failed to redirect to the Timeline version of the [employer]FS SmartLink.    Abuse Screening:  :         No data to display

## 2024-11-11 NOTE — PROGRESS NOTES
Kenya Tee is a 89 y.o. female Established patient who presents today for evaluation of the following -           Assessment & Plan  1. Diabetes type 2 with nephropathy:  - Diet controlled  - Lives with daughter who prepares diabetic meals  -gave sample of DEXCOM G7 to use for next 2 weeks to evaluate glucose readings especially during vertigo episodes    2. Depression:  - Stable with Prozac 20 mg daily and Remeron 15 mg at bedtime  -no adjustments needed at this time    3. Chronic renal impairment stage IV:  - Kidney functions checked today  - Encouraged to maintain hydration    4. Persistent vertigo:  - Ongoing for several months, intermittent  - PT evaluation without improvement  - Uses Zofran for nausea  - BP stable during episodes, lasting up to 45 minutes, resolving spontaneously  - No inciting activity, occurring more frequently  - Carotid ultrasound, echocardiogram, head CT, and labs ordered      5. AV block  -has pacemaker.  Managed by cardiology      Orders Placed This Encounter    CT HEAD W WO CONTRAST     Standing Status:   Future     Standing Expiration Date:   11/11/2025     Order Specific Question:   Additional Contrast?     Answer:   Radiologist Recommendation     Order Specific Question:   STAT Creatinine as needed:     Answer:   No     Comments:   has renal impairment     Order Specific Question:   Reason for exam:     Answer:   persistent vertigo    Comprehensive Metabolic Panel     Standing Status:   Future     Number of Occurrences:   1     Standing Expiration Date:   11/11/2025    Hemoglobin A1C     Standing Status:   Future     Number of Occurrences:   1     Standing Expiration Date:   11/11/2025    Continuous Glucose Sensor (DEXCOM G7 SENSOR) MISC     Sig: Use daily as directed     Dispense:  1 each     Refill:  0     Order Specific Question:   Expiration Date     Answer:   10/31/2025     Order Specific Question:   Lot#     Answer:   [id number]     Order Specific Question:

## 2024-11-21 ENCOUNTER — HOSPITAL ENCOUNTER (OUTPATIENT)
Facility: HOSPITAL | Age: 89
Discharge: HOME | End: 2024-11-21
Attending: INTERNAL MEDICINE
Payer: MEDICARE

## 2024-11-21 DIAGNOSIS — R42 VERTIGO: ICD-10-CM

## 2024-11-21 PROCEDURE — 70470 CT HEAD/BRAIN W/O & W/DYE: CPT

## 2024-11-21 PROCEDURE — 6360000004 HC RX CONTRAST MEDICATION: Performed by: INTERNAL MEDICINE

## 2024-11-21 RX ORDER — IOPAMIDOL 612 MG/ML
100 INJECTION, SOLUTION INTRAVASCULAR
Status: COMPLETED | OUTPATIENT
Start: 2024-11-21 | End: 2024-11-21

## 2024-11-21 RX ADMIN — IOPAMIDOL 100 ML: 612 INJECTION, SOLUTION INTRAVENOUS at 11:59

## 2024-12-04 DIAGNOSIS — F33.1 MAJOR DEPRESSIVE DISORDER, RECURRENT, MODERATE (HCC): ICD-10-CM

## 2024-12-05 DIAGNOSIS — F33.1 MAJOR DEPRESSIVE DISORDER, RECURRENT, MODERATE (HCC): ICD-10-CM

## 2024-12-06 RX ORDER — MIRTAZAPINE 15 MG/1
15 TABLET, FILM COATED ORAL NIGHTLY
Qty: 90 TABLET | Refills: 0 | Status: SHIPPED | OUTPATIENT
Start: 2024-12-06

## 2024-12-06 NOTE — TELEPHONE ENCOUNTER
Rx sent to pharmacy as previously filled and verified by Verbal Order Read Back with provider.    NV 03/18/2025

## 2024-12-08 RX ORDER — MIRTAZAPINE 15 MG/1
15 TABLET, FILM COATED ORAL NIGHTLY
Qty: 90 TABLET | Refills: 0 | OUTPATIENT
Start: 2024-12-08

## 2024-12-11 ENCOUNTER — HOSPITAL ENCOUNTER (OUTPATIENT)
Facility: HOSPITAL | Age: 88
Discharge: HOME | End: 2024-12-13
Attending: INTERNAL MEDICINE
Payer: MEDICARE

## 2024-12-11 DIAGNOSIS — R42 VERTIGO: ICD-10-CM

## 2024-12-11 DIAGNOSIS — R01.1 MURMUR, CARDIAC: ICD-10-CM

## 2024-12-11 LAB
ECHO AO ASC DIAM: 3.1 CM
ECHO AO ROOT DIAM: 3.2 CM
ECHO AV AREA PEAK VELOCITY: 1.4 CM2
ECHO AV PEAK GRADIENT: 14 MMHG
ECHO AV PEAK VELOCITY: 1.9 M/S
ECHO AV VELOCITY RATIO: 0.42
ECHO EST RA PRESSURE: 3 MMHG
ECHO LA DIAMETER: 3.4 CM
ECHO LA TO AORTIC ROOT RATIO: 1.06
ECHO LA VOL A-L A2C: 39 ML (ref 22–52)
ECHO LA VOL A-L A4C: 48 ML (ref 22–52)
ECHO LA VOL BP: 43 ML (ref 22–52)
ECHO LA VOL MOD A2C: 37 ML (ref 22–52)
ECHO LA VOL MOD A4C: 45 ML (ref 22–52)
ECHO LA VOLUME AREA LENGTH: 46 ML
ECHO LV E' LATERAL VELOCITY: 5.1 CM/S
ECHO LV E' SEPTAL VELOCITY: 5.15 CM/S
ECHO LV EDV A2C: 41 ML
ECHO LV EDV A4C: 44 ML
ECHO LV EDV BP: 43 ML (ref 56–104)
ECHO LV EJECTION FRACTION A2C: 65 %
ECHO LV EJECTION FRACTION A4C: 54 %
ECHO LV EJECTION FRACTION BIPLANE: 61 % (ref 55–100)
ECHO LV ESV A2C: 14 ML
ECHO LV ESV A4C: 21 ML
ECHO LV ESV BP: 17 ML (ref 19–49)
ECHO LV FRACTIONAL SHORTENING: 27 % (ref 28–44)
ECHO LV INTERNAL DIMENSION DIASTOLIC: 3.7 CM (ref 3.9–5.3)
ECHO LV INTERNAL DIMENSION SYSTOLIC: 2.7 CM
ECHO LV IVSD: 1 CM (ref 0.6–0.9)
ECHO LV MASS 2D: 129.3 G (ref 67–162)
ECHO LV POSTERIOR WALL DIASTOLIC: 1.2 CM (ref 0.6–0.9)
ECHO LV RELATIVE WALL THICKNESS RATIO: 0.65
ECHO LVOT AREA: 3.1 CM2
ECHO LVOT DIAM: 2 CM
ECHO LVOT PEAK GRADIENT: 3 MMHG
ECHO LVOT PEAK VELOCITY: 0.8 M/S
ECHO MV A VELOCITY: 1.27 M/S
ECHO MV AREA PHT: 2.8 CM2
ECHO MV E DECELERATION TIME (DT): 273.7 MS
ECHO MV E VELOCITY: 0.76 M/S
ECHO MV E/A RATIO: 0.6
ECHO MV E/E' LATERAL: 14.9
ECHO MV E/E' RATIO (AVERAGED): 14.83
ECHO MV E/E' SEPTAL: 14.76
ECHO MV MAX VELOCITY: 1.5 M/S
ECHO MV MEAN GRADIENT: 2 MMHG
ECHO MV MEAN VELOCITY: 0.6 M/S
ECHO MV PEAK GRADIENT: 9 MMHG
ECHO MV PRESSURE HALF TIME (PHT): 79.4 MS
ECHO MV VTI: 40.7 CM
ECHO RIGHT VENTRICULAR SYSTOLIC PRESSURE (RVSP): 36 MMHG
ECHO RV BASAL DIMENSION: 3.1 CM
ECHO RV FREE WALL PEAK S': 9.7 CM/S
ECHO RV LONGITUDINAL DIMENSION: 6.8 CM
ECHO RV MID DIMENSION: 1.9 CM
ECHO RV TAPSE: 1.4 CM (ref 1.7–?)
ECHO TV REGURGITANT MAX VELOCITY: 2.88 M/S
ECHO TV REGURGITANT PEAK GRADIENT: 33 MMHG
VAS LEFT CCA DIST EDV: 16.3 CM/S
VAS LEFT CCA DIST PSV: 94.9 CM/S
VAS LEFT CCA PROX EDV: 16.4 CM/S
VAS LEFT CCA PROX PSV: 81.7 CM/S
VAS LEFT ECA EDV: 6.9 CM/S
VAS LEFT ECA PSV: 82.7 CM/S
VAS LEFT ICA DIST EDV: 19 CM/S
VAS LEFT ICA DIST PSV: 64 CM/S
VAS LEFT ICA MID EDV: 21.2 CM/S
VAS LEFT ICA MID PSV: 75 CM/S
VAS LEFT ICA PROX EDV: 13.5 CM/S
VAS LEFT ICA PROX PSV: 83.8 CM/S
VAS LEFT ICA/CCA PSV: 0.9 NO UNITS
VAS LEFT VERTEBRAL EDV: 15.7 CM/S
VAS LEFT VERTEBRAL PSV: 61.8 CM/S
VAS RIGHT CCA DIST EDV: 22.5 CM/S
VAS RIGHT CCA DIST PSV: 108.5 CM/S
VAS RIGHT CCA PROX EDV: 14.3 CM/S
VAS RIGHT CCA PROX PSV: 140.1 CM/S
VAS RIGHT ECA EDV: 8 CM/S
VAS RIGHT ECA PSV: 76.2 CM/S
VAS RIGHT ICA DIST EDV: 15.4 CM/S
VAS RIGHT ICA DIST PSV: 67.4 CM/S
VAS RIGHT ICA MID EDV: 16.3 CM/S
VAS RIGHT ICA MID PSV: 71.1 CM/S
VAS RIGHT ICA PROX EDV: 10.2 CM/S
VAS RIGHT ICA PROX PSV: 78.4 CM/S
VAS RIGHT ICA/CCA PSV: 0.7 NO UNITS
VAS RIGHT VERTEBRAL EDV: 9.7 CM/S
VAS RIGHT VERTEBRAL PSV: 59.8 CM/S

## 2024-12-11 PROCEDURE — 93306 TTE W/DOPPLER COMPLETE: CPT

## 2024-12-11 PROCEDURE — 93880 EXTRACRANIAL BILAT STUDY: CPT

## 2025-01-04 NOTE — PROGRESS NOTES
Assessment/Plan:     1. Major depressive disorder, recurrent, moderate ([address]  -long discussion of her ongoing depression.  -has a very supportive family. -willing to accept treatment.   -will work on stopping etoh use  -discussed use of antidepressant. Patient would like to start.   -started prozac 20mg daily  -return in 1 month for reevaluation. 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  -she would like PT and to learn how to use the walker properly to stabilize her gait. - Walker (Ultra-Light Rollator) misc; Use daily as directed. R27.8  Dispense: 1 Each; Refill: 0  - REFERRAL TO HOME HEALTH       Orders Placed This Encounter    REFERRAL TO HOME HEALTH     Referral Priority:   Routine     Referral Type:   Home Health Evaluation     Referral Reason:   Continuity of Care     Number of Visits Requested:   1    FLUoxetine (PROzac) 20 mg tablet     Sig: Take 1 Tablet by mouth daily. Dispense:  30 Tablet     Refill:  1    Walker (Ultra-Light Rollator) misc     Sig: Use daily as directed. R27.8     Dispense:  1 Each     Refill:  0    Wheel Chair fide     Sig: R27.8     Dispense:  1 Each     Refill:  0      /On this 7/14/2022 I have spent 60 minutes reviewin/g previous notes, test results and face to face with the patient discussing the diagnosis and importance of compliance with the treatment plan as well as documenting on the day of the visit. Follow-up Disposition:     follow up 1 month. Subjective:      Valeriano Valladares is a 80 y.o. female who presents today for follow up of her ER visit and depression    Here today with her daughter. Since last visit :  -was seen in ER on 6/20/2022 with anger outburst and alcohol intoxication. [facility]  consulted in ER. Daughter states that her sister and her mother were out running errands.   Noted that her mother's behavior was odd and she became angry and stated that Your recent lab work was within normal range. she would like to end her life.      -patient reports that she said it out of anger. No plans for suicide. She is drinking several drinks per day. She has been very depressed. Lives alone. Supportive family and friends. Does not drive. She has difficulty sleepy due to ruminating of days events or thing to come. Admits to use of alcohol to escape for a bit. -depression from decreased social contact, decreased independence.       -had follow up with cardiology on 6/14/2022. History of total AV block. Pacemaker check every 3 months. Review of cardiology evaluation - not changes or adjustments. Objective: Wt Readings from Last 3 Encounters:   06/20/22 207 lb 7.3 oz (94.1 kg)   06/14/22 203 lb (92.1 kg)   03/10/22 203 lb 9.6 oz (92.4 kg)     BP Readings from Last 3 Encounters:   06/20/22 123/64   06/14/22 122/80   03/10/22 103/67     Visit Vitals  /80 (BP 1 Location: Left arm, BP Patient Position: Sitting, BP Cuff Size: Adult)   Pulse 88   Temp 98.4 °F (36.9 °C)   Resp 16   Ht 5' 7\" (1.702 m)   Wt 203 lb (92.1 kg)   LMP  (LMP Unknown)   SpO2 95%   BMI 31.79 kg/m²     General appearance: alert, cooperative, no distress, appears stated age  Head: Normocephalic, without obvious abnormality, atraumatic  Lungs: clear to auscultation bilaterally  Heart: regular rate and rhythm  Neurologic: Mental status: Alert, oriented, thought content appropriate, affect: stable, tearful with talking about depression. Disclaimer:  Return if symptoms worsen or fail to improve. Advised patient to call back or return to office if symptoms worsen/change/persist.     Discussed expected course/resolution/complications of diagnosis in detail with patient. Medication risks/benefits/costs/interactions/alternatives discussed with patient. Patient was given an after visit summary which includes diagnoses, current medications, & vitals. Patient expressed understanding with the diagnosis and plan.

## 2025-01-07 ENCOUNTER — TELEPHONE (OUTPATIENT)
Age: 89
End: 2025-01-07

## 2025-01-07 DIAGNOSIS — R42 DIZZINESS: ICD-10-CM

## 2025-01-07 DIAGNOSIS — I47.19 PAT (PAROXYSMAL ATRIAL TACHYCARDIA) (HCC): Primary | ICD-10-CM

## 2025-01-07 DIAGNOSIS — Z95.0 CARDIAC PACEMAKER IN SITU: ICD-10-CM

## 2025-01-07 NOTE — TELEPHONE ENCOUNTER
Verified patient with two types of identifiers.  Spoke with daughter Irma, verified with PHI.  Informed daughter that no events were noted on transmission, but patient is having PVCs.  Per Malou KERN, 48 hour holter monitor is recommended.  Scheduled patient/daughter to come to office tomorrow for monitor.  Location/date/time confirmed.  Patient verbalized understanding and will call with any other questions.        Future Appointments   Date Time Provider Department Center   1/8/2025  2:00 PM [facility] [place] [facility]EDRO CARDIAC MONITOR [facility]   3/6/2025 10:40 AM [email or web address] [facility]   3/6/2025 11:00 AM Hussain De Anda MD CAVREY [facility]   3/18/2025 12:40 PM Cinthya Scanlon MD WEIM [facility] DE[facility]

## 2025-01-07 NOTE — TELEPHONE ENCOUNTER
Patients daughter called in stating she wants to get her pacemaker checked just to rule out some things.      Phone [phone]

## 2025-01-07 NOTE — TELEPHONE ENCOUNTER
Verified patient with two types of identifiers.  Spoke with daughter Irma, verified with PHI.  She states that patient has been feeling intermittently dizzy.  Her SBPs have been in the 130s; daughter was unsure of her heart rate.  Patient is not taking any cardiac medications.  Discussed sending remote transmission; will return call with any recommendations.  Patient verbalized understanding and will call with any other questions.        Future Appointments   Date Time Provider Department Center   3/6/2025 10:40 AM PEDRO TREADWELL BS [facility]   3/6/2025 11:00 AM Hussain De Anda MD CAVREY [facility]   3/18/2025 12:40 PM Cinthya Scanlon MD WEIM [facility] DE[facility]

## 2025-01-08 ENCOUNTER — ANCILLARY PROCEDURE (OUTPATIENT)
Age: 89
End: 2025-01-08
Payer: MEDICARE

## 2025-01-08 DIAGNOSIS — I47.19 PAT (PAROXYSMAL ATRIAL TACHYCARDIA) (HCC): ICD-10-CM

## 2025-01-08 DIAGNOSIS — Z95.0 CARDIAC PACEMAKER IN SITU: ICD-10-CM

## 2025-01-08 DIAGNOSIS — R42 DIZZINESS: ICD-10-CM

## 2025-01-08 PROCEDURE — 93225 XTRNL ECG REC<48 HRS REC: CPT | Performed by: INTERNAL MEDICINE

## 2025-01-16 ENCOUNTER — HOSPITAL ENCOUNTER (EMERGENCY)
Facility: HOSPITAL | Age: 89
Discharge: HOME | End: 2025-01-16
Attending: EMERGENCY MEDICINE
Payer: MEDICARE

## 2025-01-16 ENCOUNTER — APPOINTMENT (OUTPATIENT)
Facility: HOSPITAL | Age: 89
End: 2025-01-16
Payer: MEDICARE

## 2025-01-16 VITALS
SYSTOLIC BLOOD PRESSURE: 135 MMHG | HEIGHT: 68 IN | OXYGEN SATURATION: 98 % | DIASTOLIC BLOOD PRESSURE: 104 MMHG | WEIGHT: 188.93 LBS | TEMPERATURE: 97.9 F | HEART RATE: 85 BPM | BODY MASS INDEX: 28.63 KG/M2 | RESPIRATION RATE: 16 BRPM
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
## 2025-01-16 DIAGNOSIS — R06.2 WHEEZING: ICD-10-CM

## 2025-01-16 LAB
ALBUMIN SERPL-MCNC: 3.6 G/DL (ref 3.5–5)
ALBUMIN/GLOB SERPL: 1 (ref 1.1–2.2)
ALP SERPL-CCNC: 70 U/L (ref 45–117)
ALT SERPL-CCNC: 13 U/L (ref 12–78)
ANION GAP SERPL CALC-SCNC: 7 MMOL/L (ref 2–12)
AST SERPL-CCNC: 14 U/L (ref 15–37)
BASOPHILS # BLD: 0.05 K/UL (ref 0–0.1)
BASOPHILS NFR BLD: 0.7 % (ref 0–1)
BILIRUB SERPL-MCNC: 0.4 MG/DL (ref 0.2–1)
BUN SERPL-MCNC: 21 MG/DL (ref 6–20)
BUN/CREAT SERPL: 16 (ref 12–20)
CALCIUM SERPL-MCNC: 9.9 MG/DL (ref 8.5–10.1)
CHLORIDE SERPL-SCNC: 106 MMOL/L (ref 97–108)
CO2 SERPL-SCNC: 30 MMOL/L (ref 21–32)
CREAT SERPL-MCNC: 1.32 MG/DL (ref 0.55–1.02)
DIFFERENTIAL METHOD BLD: ABNORMAL
EOSINOPHIL # BLD: 0.18 K/UL (ref 0–0.4)
EOSINOPHIL NFR BLD: 2.6 % (ref 0–7)
ERYTHROCYTE [DISTWIDTH] IN BLOOD BY AUTOMATED COUNT: 13.6 % (ref 11.5–14.5)
GLOBULIN SER CALC-MCNC: 3.5 G/DL (ref 2–4)
GLUCOSE SERPL-MCNC: 111 MG/DL (ref 65–100)
HCT VFR BLD AUTO: 42.1 % (ref 35–47)
HGB BLD-MCNC: 13.9 G/DL (ref 11.5–16)
IMM GRANULOCYTES # BLD AUTO: 0.04 K/UL (ref 0–0.04)
IMM GRANULOCYTES NFR BLD AUTO: 0.6 % (ref 0–0.5)
LYMPHOCYTES # BLD: 0.66 K/UL (ref 0.8–3.5)
LYMPHOCYTES NFR BLD: 9.7 % (ref 12–49)
MCH RBC QN AUTO: 29.3 PG (ref 26–34)
MCHC RBC AUTO-ENTMCNC: 33 G/DL (ref 30–36.5)
MCV RBC AUTO: 88.6 FL (ref 80–99)
MONOCYTES # BLD: 0.7 K/UL (ref 0–1)
MONOCYTES NFR BLD: 10.3 % (ref 5–13)
NEUTS SEG # BLD: 5.17 K/UL (ref 1.8–8)
NEUTS SEG NFR BLD: 76.1 % (ref 32–75)
NRBC # BLD: 0 K/UL (ref 0–0.01)
NRBC BLD-RTO: 0 PER 100 WBC
PLATELET # BLD AUTO: 196 K/UL (ref 150–400)
PMV BLD AUTO: 10.4 FL (ref 8.9–12.9)
POTASSIUM SERPL-SCNC: 4.8 MMOL/L (ref 3.5–5.1)
PROT SERPL-MCNC: 7.1 G/DL (ref 6.4–8.2)
RBC # BLD AUTO: 4.75 M/UL (ref 3.8–5.2)
SODIUM SERPL-SCNC: 143 MMOL/L (ref 136–145)
WBC # BLD AUTO: 6.8 K/UL (ref 3.6–11)

## 2025-01-16 PROCEDURE — 80053 COMPREHEN METABOLIC PANEL: CPT

## 2025-01-16 PROCEDURE — 85025 COMPLETE CBC W/AUTO DIFF WBC: CPT

## 2025-01-16 PROCEDURE — 73030 X-RAY EXAM OF SHOULDER: CPT

## 2025-01-16 PROCEDURE — 70450 CT HEAD/BRAIN W/O DYE: CPT

## 2025-01-16 PROCEDURE — 71045 X-RAY EXAM CHEST 1 VIEW: CPT

## 2025-01-16 PROCEDURE — 36415 COLL VENOUS BLD VENIPUNCTURE: CPT

## 2025-01-16 PROCEDURE — 99284 EMERGENCY DEPT VISIT MOD MDM: CPT

## 2025-01-16 PROCEDURE — 6370000000 HC RX 637 (ALT 250 FOR IP): Performed by: EMERGENCY MEDICINE

## 2025-01-16 RX ORDER — ALBUTEROL SULFATE 90 UG/1
2 INHALANT RESPIRATORY (INHALATION) EVERY 4 HOURS PRN
Qty: 18 G | Refills: 0 | Status: SHIPPED | OUTPATIENT
Start: 2025-01-16

## 2025-01-16 RX ORDER — IPRATROPIUM BROMIDE AND ALBUTEROL SULFATE 2.5; .5 MG/3ML; MG/3ML
1 SOLUTION RESPIRATORY (INHALATION)
Status: COMPLETED | OUTPATIENT
Start: 2025-01-16 | End: 2025-01-16

## 2025-01-16 RX ADMIN — IPRATROPIUM BROMIDE AND ALBUTEROL SULFATE 1 DOSE: .5; 3 SOLUTION RESPIRATORY (INHALATION) at 10:17

## 2025-01-16 ASSESSMENT — PAIN - FUNCTIONAL ASSESSMENT: PAIN_FUNCTIONAL_ASSESSMENT: NONE - DENIES PAIN

## 2025-01-16 NOTE — ED TRIAGE NOTES
Patient tripped and fell in the bathroom, was on the floor less then an hour, patient's only complaint is a skin tear on the left wrist, patient has no other complaints

## 2025-01-16 NOTE — ED PROVIDER NOTES
[facility]  EMERGENCY DEPARTMENT ENCOUNTER      Pt Name: Kenya Tee  MRN: [id number]  Birthdate 1934  Date of evaluation: 1/16/2025  Provider: Ankit Blackmon DO    CHIEF COMPLAINT       Chief Complaint   Patient presents with    Fall         HISTORY OF PRESENT ILLNESS   (Location/Symptom, Timing/Onset, Context/Setting, Quality, Duration, Modifying Factors, Severity)  Note limiting factors.   89-year-old female comes in for fall.  Patient states that she walked into her bathroom when she tripped falling.  No loss of consciousness.  She does not think she hit her head.  She comes in with complaints of a skin tear to her left lateral wrist.  Daughter arrives stating that patient was somewhat altered this morning when she found her on the floor and was concerned about that and if the patient had an issue with dizziness and vomiting with something vertigo over the last several months.  Daughter thinks that is what made the patient fall but patient denies any symptoms of dizziness.  She states that she did smoke for a long time and thinks she is wheezing.            Review of External Medical Records:     Nursing Notes were reviewed.    REVIEW OF SYSTEMS    (2-9 systems for level 4, 10 or more for level 5)     Review of Systems    Except as noted above the remainder of the review of systems was reviewed and negative.       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    Menopause     LMP-?    Other and unspecified hyperlipidemia 4/4/2011    Rotator cuff tear 8/2011    right    S/P ERNESTINE-BSO 3/2/2012         SURGICAL HISTORY

## 2025-01-16 NOTE — ED NOTES
PATIENT DISCHARGED IN STABLE CONDITION PER MD ORDER. INSTRUCTIONS AND FOLLOW UP DISCUSSED WITH PATIENT. PATIENT VERBALIZED UNDERSTANDING AND HAS NO FURTHER QUESTIONS. AAOX4. NAD NOTED

## 2025-01-20 ENCOUNTER — TELEPHONE (OUTPATIENT)
Age: 89
End: 2025-01-20

## 2025-01-20 NOTE — TELEPHONE ENCOUNTER
----- Message from Dr. Hussain De Anda MD sent at 1/17/2025 11:50 AM EST -----  Holter  Occasional atrial and ventricular pacing    SVE(s): [place] was 4.62 %, 97001 total SVE(s)  PVC(s): [place] was 1.5 %, 3587 total PVC(s), 3 disparate morphologies    Patient recorded 2 event(s) during the monitoring period- proper pacing      Future Appointments  3/6/2025   10:40 AM   PEDRO TREADWELL              BS AMB  3/6/2025   11:00 AM   Hussain De Anda MD CAVREY              BS AMB  3/18/2025  12:40 PM   [facility]   No No No

## 2025-03-03 DIAGNOSIS — F33.1 MAJOR DEPRESSIVE DISORDER, RECURRENT, MODERATE (HCC): ICD-10-CM

## 2025-03-04 RX ORDER — MIRTAZAPINE 15 MG/1
15 TABLET, FILM COATED ORAL NIGHTLY
Qty: 90 TABLET | Refills: 0 | Status: SHIPPED | OUTPATIENT
Start: 2025-03-04

## 2025-03-06 ENCOUNTER — PROCEDURE VISIT (OUTPATIENT)
Age: 89
End: 2025-03-06
Payer: MEDICARE

## 2025-03-06 ENCOUNTER — OFFICE VISIT (OUTPATIENT)
Age: 89
End: 2025-03-06
Payer: MEDICARE

## 2025-03-06 VITALS
DIASTOLIC BLOOD PRESSURE: 72 MMHG | WEIGHT: 185 LBS | HEART RATE: 75 BPM | OXYGEN SATURATION: 93 % | BODY MASS INDEX: 28.04 KG/M2 | HEIGHT: 68 IN | SYSTOLIC BLOOD PRESSURE: 120 MMHG

## 2025-03-06 DIAGNOSIS — R42 VERTIGO: ICD-10-CM

## 2025-03-06 DIAGNOSIS — Z95.0 CARDIAC PACEMAKER IN SITU: Primary | ICD-10-CM

## 2025-03-06 DIAGNOSIS — I44.2 ATRIOVENTRICULAR BLOCK, COMPLETE (HCC): ICD-10-CM

## 2025-03-06 DIAGNOSIS — I49.1 PAC (PREMATURE ATRIAL CONTRACTION): ICD-10-CM

## 2025-03-06 DIAGNOSIS — R42 DIZZINESS: ICD-10-CM

## 2025-03-06 DIAGNOSIS — I49.3 PVC (PREMATURE VENTRICULAR CONTRACTION): ICD-10-CM

## 2025-03-06 PROCEDURE — 99214 OFFICE O/P EST MOD 30 MIN: CPT | Performed by: INTERNAL MEDICINE

## 2025-03-06 ASSESSMENT — PATIENT HEALTH QUESTIONNAIRE - PHQ9
SUM OF ALL RESPONSES TO PHQ QUESTIONS 1-9: 0
2. FEELING DOWN, DEPRESSED OR HOPELESS: NOT AT ALL
1. LITTLE INTEREST OR PLEASURE IN DOING THINGS: NOT AT ALL
SUM OF ALL RESPONSES TO PHQ QUESTIONS 1-9: 0

## 2025-03-06 NOTE — PROGRESS NOTES
1. Have you been to the ER, urgent care clinic since your last visit?  Hospitalized since your last visit? yes  1/2025 [facility] [place] fall  2. Have you seen or consulted any other health care providers outside of the [facility] since your last visit?  Include any pap smears or colon screening.  no

## 2025-03-06 NOTE — PROGRESS NOTES
Cardiac Electrophysiology Office Note           Subjective:         Kenya Fowler is a 89 y.o.patient who presents for follow up, is s/p Medtronic dual chamber pacemaker (gen change 08/18/2017, leads 12/18/1996).      She reports doing well, denies any chest pain, palpitations, SOB, PND, orthopnea, syncope, or edema.     She is in wheel chair, her daughter said she has spinning sensation but ENT said nothing is found as cause    Holter with 5% PAC and less so PVC      Lexiscan cardiolite stress test in 12/2007 showed LVEF 76% with no ischemia.      12/11/24    ECHO (TTE) COMPLETE (PRN CONTRAST/BUBBLE/STRAIN/3D) 12/11/2024  4:27 PM (Final)    Interpretation Summary    Left Ventricle: Normal left ventricular systolic function with a visually estimated EF of 55 - 60%. Left ventricle size is normal. Mildly increased wall thickness. Unable to assess wall motion. Abnormal diastolic function.    Right Ventricle: Not well visualized. Right ventricle size is normal. Low normal systolic function. TAPSE is abnormal. TAPSE is 1.4 cm.    Aortic Valve: Mild stenosis of the aortic valve. AV peak gradient is 14 mmHg.    Mitral Valve: Mild regurgitation.    Tricuspid Valve: Mild regurgitation. Normal RVSP. The estimated RVSP is 36 mmHg.    Aorta: Normal sized aortic root and ascending aorta. Ao root diameter is 3.2 cm. Ao ascending diameter is 3.1 cm.    Image quality is suboptimal. Technically difficult study.    Signed by: Andrea Hernández MD on 12/11/2024  4:27 PM         Previous:   PAT noted via device checks.      Medtronic dual chamber pacemaker (gen change 08/18/2017, leads 12/18/1996).  RV pacing threshold chronically elevated.        Current Outpatient Medications   Medication Sig    mirtazapine (REMERON) 15 MG tablet TAKE ONE TABLET BY MOUTH EVERY EVENING    FLUoxetine (PROZAC) 20 MG capsule TAKE ONE CAPSULE BY MOUTH ONE TIME DAILY    ondansetron (ZOFRAN-ODT) 4 MG disintegrating tablet Take 1

## 2025-03-10 DIAGNOSIS — F33.1 MAJOR DEPRESSIVE DISORDER, RECURRENT, MODERATE (HCC): ICD-10-CM

## 2025-03-11 RX ORDER — MIRTAZAPINE 15 MG/1
15 TABLET, FILM COATED ORAL NIGHTLY
Qty: 90 TABLET | Refills: 0 | Status: SHIPPED | OUTPATIENT
Start: 2025-03-11

## 2025-03-11 RX ORDER — ONDANSETRON 4 MG/1
4 TABLET, ORALLY DISINTEGRATING ORAL 3 TIMES DAILY PRN
Qty: 30 TABLET | Refills: 1 | Status: SHIPPED | OUTPATIENT
Start: 2025-03-11

## 2025-03-14 ENCOUNTER — TELEPHONE (OUTPATIENT)
Age: 89
End: 2025-03-14

## 2025-03-14 NOTE — TELEPHONE ENCOUNTER
Attempted to contact patient regarding upcoming Medicare wellness appointment and completion of HRA questionnaire. LVM for patient to please return call at  [phone], mcm sent as well.

## 2025-03-17 ENCOUNTER — TELEPHONE (OUTPATIENT)
Age: 89
End: 2025-03-17

## 2025-03-17 NOTE — TELEPHONE ENCOUNTER
Attempted to contact patient x 2 regarding upcoming Medicare wellness appointment and completion of HRA questionnaire. LVM for patient to please return call at  [phone], or complete via .

## 2025-03-18 ENCOUNTER — OFFICE VISIT (OUTPATIENT)
Age: 89
End: 2025-03-18
Payer: MEDICARE

## 2025-03-18 VITALS
DIASTOLIC BLOOD PRESSURE: 70 MMHG | WEIGHT: 188.2 LBS | OXYGEN SATURATION: 94 % | SYSTOLIC BLOOD PRESSURE: 110 MMHG | HEART RATE: 92 BPM | HEIGHT: 68 IN | BODY MASS INDEX: 28.52 KG/M2 | RESPIRATION RATE: 16 BRPM | TEMPERATURE: 97.1 F

## 2025-03-18 DIAGNOSIS — R42 VERTIGO: ICD-10-CM

## 2025-03-18 DIAGNOSIS — J45.20 MILD INTERMITTENT REACTIVE AIRWAY DISEASE WITHOUT COMPLICATION: ICD-10-CM

## 2025-03-18 DIAGNOSIS — E11.21 TYPE 2 DIABETES WITH NEPHROPATHY (HCC): ICD-10-CM

## 2025-03-18 DIAGNOSIS — Z79.899 ENCOUNTER FOR LONG-TERM (CURRENT) USE OF MEDICATIONS: ICD-10-CM

## 2025-03-18 DIAGNOSIS — R41.3 MEMORY IMPAIRMENT: ICD-10-CM

## 2025-03-18 DIAGNOSIS — I44.2 ATRIOVENTRICULAR BLOCK, COMPLETE (HCC): ICD-10-CM

## 2025-03-18 DIAGNOSIS — Z00.00 MEDICARE ANNUAL WELLNESS VISIT, SUBSEQUENT: Primary | ICD-10-CM

## 2025-03-18 DIAGNOSIS — N18.30 CHRONIC RENAL INSUFFICIENCY, STAGE 3 (MODERATE) (HCC): ICD-10-CM

## 2025-03-18 DIAGNOSIS — F33.1 MAJOR DEPRESSIVE DISORDER, RECURRENT, MODERATE (HCC): ICD-10-CM

## 2025-03-18 DIAGNOSIS — R26.81 GAIT INSTABILITY: ICD-10-CM

## 2025-03-18 PROCEDURE — G0439 PPPS, SUBSEQ VISIT: HCPCS | Performed by: INTERNAL MEDICINE

## 2025-03-18 PROCEDURE — 1159F MED LIST DOCD IN RCRD: CPT | Performed by: INTERNAL MEDICINE

## 2025-03-18 PROCEDURE — 1126F AMNT PAIN NOTED NONE PRSNT: CPT | Performed by: INTERNAL MEDICINE

## 2025-03-18 PROCEDURE — G8417 CALC BMI ABV UP PARAM F/U: HCPCS | Performed by: INTERNAL MEDICINE

## 2025-03-18 PROCEDURE — 1160F RVW MEDS BY RX/DR IN RCRD: CPT | Performed by: INTERNAL MEDICINE

## 2025-03-18 PROCEDURE — 99214 OFFICE O/P EST MOD 30 MIN: CPT | Performed by: INTERNAL MEDICINE

## 2025-03-18 PROCEDURE — 1090F PRES/ABSN URINE INCON ASSESS: CPT | Performed by: INTERNAL MEDICINE

## 2025-03-18 PROCEDURE — 1123F ACP DISCUSS/DSCN MKR DOCD: CPT | Performed by: INTERNAL MEDICINE

## 2025-03-18 PROCEDURE — 1036F TOBACCO NON-USER: CPT | Performed by: INTERNAL MEDICINE

## 2025-03-18 PROCEDURE — G8427 DOCREV CUR MEDS BY ELIG CLIN: HCPCS | Performed by: INTERNAL MEDICINE

## 2025-03-18 RX ORDER — ALBUTEROL SULFATE 90 UG/1
2 INHALANT RESPIRATORY (INHALATION) EVERY 4 HOURS PRN
Qty: 18 G | Refills: 1 | Status: SHIPPED | OUTPATIENT
Start: 2025-03-18

## 2025-03-18 SDOH — ECONOMIC STABILITY: FOOD INSECURITY: WITHIN THE PAST 12 MONTHS, THE FOOD YOU BOUGHT JUST DIDN'T LAST AND YOU DIDN'T HAVE MONEY TO GET MORE.: NEVER TRUE

## 2025-03-18 SDOH — ECONOMIC STABILITY: INCOME INSECURITY: IN THE LAST 12 MONTHS, WAS THERE A TIME WHEN YOU WERE NOT ABLE TO PAY THE MORTGAGE OR RENT ON TIME?: NO

## 2025-03-18 SDOH — HEALTH STABILITY: PHYSICAL HEALTH: ON AVERAGE, HOW MANY MINUTES DO YOU ENGAGE IN EXERCISE AT THIS LEVEL?: 0 MIN

## 2025-03-18 SDOH — ECONOMIC STABILITY: TRANSPORTATION INSECURITY
IN THE PAST 12 MONTHS, HAS THE LACK OF TRANSPORTATION KEPT YOU FROM MEDICAL APPOINTMENTS OR FROM GETTING MEDICATIONS?: NO

## 2025-03-18 SDOH — ECONOMIC STABILITY: FOOD INSECURITY: WITHIN THE PAST 12 MONTHS, YOU WORRIED THAT YOUR FOOD WOULD RUN OUT BEFORE YOU GOT MONEY TO BUY MORE.: NEVER TRUE

## 2025-03-18 SDOH — HEALTH STABILITY: PHYSICAL HEALTH: ON AVERAGE, HOW MANY DAYS PER WEEK DO YOU ENGAGE IN MODERATE TO STRENUOUS EXERCISE (LIKE A BRISK WALK)?: 0 DAYS

## 2025-03-18 SDOH — ECONOMIC STABILITY: TRANSPORTATION INSECURITY
IN THE PAST 12 MONTHS, HAS LACK OF TRANSPORTATION KEPT YOU FROM MEETINGS, WORK, OR FROM GETTING THINGS NEEDED FOR DAILY LIVING?: NO

## 2025-03-18 ASSESSMENT — PATIENT HEALTH QUESTIONNAIRE - PHQ9
8. MOVING OR SPEAKING SO SLOWLY THAT OTHER PEOPLE COULD HAVE NOTICED. OR THE OPPOSITE, BEING SO FIGETY OR RESTLESS THAT YOU HAVE BEEN MOVING AROUND A LOT MORE THAN USUAL: NOT AT ALL
SUM OF ALL RESPONSES TO PHQ QUESTIONS 1-9: 0
7. TROUBLE CONCENTRATING ON THINGS, SUCH AS READING THE NEWSPAPER OR WATCHING TELEVISION: NOT AT ALL
2. FEELING DOWN, DEPRESSED OR HOPELESS: NOT AT ALL
5. POOR APPETITE OR OVEREATING: NOT AT ALL
9. THOUGHTS THAT YOU WOULD BE BETTER OFF DEAD, OR OF HURTING YOURSELF: NOT AT ALL
SUM OF ALL RESPONSES TO PHQ QUESTIONS 1-9: 0
SUM OF ALL RESPONSES TO PHQ QUESTIONS 1-9: 0
4. FEELING TIRED OR HAVING LITTLE ENERGY: NOT AT ALL
SUM OF ALL RESPONSES TO PHQ QUESTIONS 1-9: 0
2. FEELING DOWN, DEPRESSED OR HOPELESS: NOT AT ALL
6. FEELING BAD ABOUT YOURSELF - OR THAT YOU ARE A FAILURE OR HAVE LET YOURSELF OR YOUR FAMILY DOWN: NOT AT ALL
10. IF YOU CHECKED OFF ANY PROBLEMS, HOW DIFFICULT HAVE THESE PROBLEMS MADE IT FOR YOU TO DO YOUR WORK, TAKE CARE OF THINGS AT HOME, OR GET ALONG WITH OTHER PEOPLE: NOT DIFFICULT AT ALL
SUM OF ALL RESPONSES TO PHQ QUESTIONS 1-9: 0
1. LITTLE INTEREST OR PLEASURE IN DOING THINGS: NOT AT ALL
SUM OF ALL RESPONSES TO PHQ QUESTIONS 1-9: 0
SUM OF ALL RESPONSES TO PHQ QUESTIONS 1-9: 0
3. TROUBLE FALLING OR STAYING ASLEEP: NOT AT ALL
SUM OF ALL RESPONSES TO PHQ QUESTIONS 1-9: 0

## 2025-03-18 ASSESSMENT — LIFESTYLE VARIABLES
HOW OFTEN DURING THE LAST YEAR HAVE YOU BEEN UNABLE TO REMEMBER WHAT HAPPENED THE NIGHT BEFORE BECAUSE YOU HAD BEEN DRINKING: NEVER
HOW OFTEN DURING THE LAST YEAR HAVE YOU FOUND THAT YOU WERE NOT ABLE TO STOP DRINKING ONCE YOU HAD STARTED: NEVER
HAS A RELATIVE, FRIEND, DOCTOR, OR ANOTHER HEALTH PROFESSIONAL EXPRESSED CONCERN ABOUT YOUR DRINKING OR SUGGESTED YOU CUT DOWN: NO
HOW OFTEN DURING THE LAST YEAR HAVE YOU NEEDED AN ALCOHOLIC DRINK FIRST THING IN THE MORNING TO GET YOURSELF GOING AFTER A NIGHT OF HEAVY DRINKING: NEVER
HOW OFTEN DO YOU HAVE A DRINK CONTAINING ALCOHOL: 4 OR MORE TIMES A WEEK
HAVE YOU OR SOMEONE ELSE BEEN INJURED AS A RESULT OF YOUR DRINKING: NO
HOW MANY STANDARD DRINKS CONTAINING ALCOHOL DO YOU HAVE ON A TYPICAL DAY: 1 OR 2
HOW OFTEN DURING THE LAST YEAR HAVE YOU NEEDED AN ALCOHOLIC DRINK FIRST THING IN THE MORNING TO GET YOURSELF GOING AFTER A NIGHT OF HEAVY DRINKING: NEVER
HOW OFTEN DURING THE LAST YEAR HAVE YOU HAD A FEELING OF GUILT OR REMORSE AFTER DRINKING: NEVER
HOW OFTEN DO YOU HAVE A DRINK CONTAINING ALCOHOL: 5
HOW OFTEN DURING THE LAST YEAR HAVE YOU FAILED TO DO WHAT WAS NORMALLY EXPECTED FROM YOU BECAUSE OF DRINKING: NEVER
HOW MANY STANDARD DRINKS CONTAINING ALCOHOL DO YOU HAVE ON A TYPICAL DAY: 1
HOW OFTEN DURING THE LAST YEAR HAVE YOU HAD A FEELING OF GUILT OR REMORSE AFTER DRINKING: NEVER
HOW OFTEN DURING THE LAST YEAR HAVE YOU FOUND THAT YOU WERE NOT ABLE TO STOP DRINKING ONCE YOU HAD STARTED: NEVER
HAVE YOU OR SOMEONE ELSE BEEN INJURED AS A RESULT OF YOUR DRINKING: NO
HOW OFTEN DURING THE LAST YEAR HAVE YOU FAILED TO DO WHAT WAS NORMALLY EXPECTED FROM YOU BECAUSE OF DRINKING: NEVER
HAS A RELATIVE, FRIEND, DOCTOR, OR ANOTHER HEALTH PROFESSIONAL EXPRESSED CONCERN ABOUT YOUR DRINKING OR SUGGESTED YOU CUT DOWN: NO
HOW OFTEN DO YOU HAVE SIX OR MORE DRINKS ON ONE OCCASION: 1

## 2025-03-18 ASSESSMENT — MINI MENTAL STATE EXAM
WHAT IS TODAY'S DATE?: 0
PLACE DESIGN, ERASER AND PENCIL IN FRONT OF THE PERSON.  SAY:  COPY THIS DESIGN PLEASE.  SHOW: DESIGN. ALLOW: MULTIPLE TRIES. WAIT UNTIL PERSON IS FINISHED AND HANDS IT BACK. SCORE: ONLY FOR DIAGRAM WITH 4-SIDED FIGURE BETWEEN TWO 5-SIDED FIGURES: 1
WHAT DAY OF THE WEEK IS THIS?: 0
WHAT IS THE NAME OF THIS BUILDING [IN FACILITY]?/WHAT IS THE STREET ADDRESS OF THIS HOUSE [IN HOME]?: 1
SAY: PUT THE PAPER DOWN ON THE FLOOR, SCORE IF PAPER IS PLACED BACK ON FLOOR: 1
WHAT CITY/TOWN ARE WE IN?: 1
ASK THE PERSON IF HE IS RIGHT OR LEFT-HANDED. TAKE A PIECE OF PAPER AND HOLD IT UP IN
FRONT OF THE PERSON. SAY: TAKE THIS PAPER IN YOUR RIGHT/LEFT HAND (WHICHEVER IS NON-
DOMINANT), SCORE IF PAPER IS PICKED UP IN CORRECT HAND.: 1
NOW WHAT WERE THE THREE OBJECTS I ASKED YOU TO REMEMBER?: 0
SAY: I WOULD LIKE YOU TO COUNT BACKWARD FROM 100 BY SEVENS: 5
WHAT STATE [OR PROVINCE] ARE WE IN?: 1
SAY: READ THE WORDS ON THE PAGE AND THEN DO WHAT IT SAYS. THEN HAND THE PERSON
THE SHEET WITH CLOSE YOUR EYES ON IT. IF THE SUBJECT READS AND DOES NOT CLOSE THEIR EYES, REPEAT UP TO THREE TIMES. SCORE ONLY IF SUBJECT CLOSES EYES.: 1
SHOW: WRISTWATCH [OBJECT] ASK: WHAT IS THIS CALLED?: 1
WHAT COUNTRY ARE WE IN?: 1
WHICH SEASON IS THIS?: 0
WHAT FLOOR ARE WE ON [IN FACILITY]?/ WHAT ROOM ARE WE IN [IN HOME]?: 1
WHAT MONTH IS THIS?: 0
SUM ALL MMSE QUESTIONS FOR TOTAL SCORE [OUT OF 30].: 22
WHAT YEAR IS THIS?: 0
SAY: I WOULD LIKE YOU TO REPEAT THIS PHRASE AFTER ME: NO IFS, ANDS, OR BUTS.: 1
SHOW: PENCIL [OBJECT] ASK: WHAT IS THIS CALLED?: 1
HAND THE PERSON A PENCIL AND PAPER. SAY: WRITE ANY COMPLETE SENTENCE ON THAT
PIECE OF PAPER. (NOTE: THE SENTENCE MUST MAKE SENSE. IGNORE SPELLING ERRORS): 1
SAY: I AM GOING TO NAME THREE OBJECTS. WHEN I AM FINISHED, I WANT YOU TO REPEAT
THEM. REMEMBER WHAT THEY ARE BECAUSE I AM GOING TO ASK YOU TO NAME THEM AGAIN IN
A FEW MINUTES.  SAY THE FOLLOWING WORDS SLOWLY AT 1-SECOND INTERVALS - BALL/ CAR/ MAN [ITERATIONS FOR REPEAT ADMINISTRATION]: 3
SAY: FOLD THE PAPER IN HALF ONCE WITH BOTH HANDS, SCORE IF PAPER IS CORRECTLY FOLDED IN HALF.: 1

## 2025-03-18 NOTE — PROGRESS NOTES
Chief Complaint   Patient presents with    Medicare AWV     eviewed record in preparation for visit and have obtained necessary documentation.    Identified pt with two pt identifiers(name and ).      Health Maintenance Due   Topic    Respiratory Syncytial Virus (RSV) Pregnant or age 60 yrs+ (1 - 1-dose 75+ series)    Shingles vaccine (2 of 3)    Flu vaccine (1)    COVID-19 Vaccine ( season)         Chief Complaint   Patient presents with    Medicare AWV        Wt Readings from Last 3 Encounters:   25 85.4 kg (188 lb 3.2 oz)   25 83.9 kg (185 lb)   25 85.7 kg (188 lb 15 oz)     Temp Readings from Last 3 Encounters:   25 97.1 °F (36.2 °C) (Temporal)   25 97.9 °F (36.6 °C)   24 97.5 °F (36.4 °C) (Temporal)     BP Readings from Last 3 Encounters:   25 110/70   25 120/72   25 (!) 135/104     Pulse Readings from Last 3 Encounters:   25 92   25 75   25 85           Learning Assessment:  :    Failed to redirect to the Timeline version of the REVFS SmartLink.    Depression Screening:  :         No data to display                Fall Risk Assessment:  :    Failed to redirect to the Timeline version of the REVFS SmartLink.    Abuse Screening:  :         No data to display               
by mouth nightly Yes Cinthya Scanlon MD   FLUoxetine (PROZAC) 20 MG capsule Take 1 capsule by mouth daily Yes Cinthya Scanlon MD   ondansetron (ZOFRAN-ODT) 4 MG disintegrating tablet Take 1 tablet by mouth 3 times daily as needed for Nausea or Vomiting Yes Cinthya Scanlon MD   cetirizine (ZYRTEC) 10 MG tablet Take 1 tablet by mouth daily Yes Cinthya Scanlon MD       CareTeam (Including outside providers/suppliers regularly involved in providing care):   Patient Care Team:  Cinthya Scanlon MD as PCP - General  Cinthya Scanlon MD as PCP - Empaneled Provider  Hussain Taveras MD (Dermatology)     Recommendations for Preventive Services Due: see orders and patient instructions/AVS.  Recommended screening schedule for the next 5-10 years is provided to the patient in written form: see Patient Instructions/AVS.     Reviewed and updated this visit:  Allergies  Meds  Problems  Med Hx  Sexual Hx                  The patient (or guardian, if applicable) and other individuals in attendance with the patient were advised that Artificial Intelligence will be utilized during this visit to record and process the conversation to generate a clinical note. The patient (or guardian, if applicable) and other individuals in attendance at the appointment consented to the use of AI, including the recording.

## 2025-03-18 NOTE — PATIENT INSTRUCTIONS
that look at your brain.  These questions and tests can make sure you don't have other conditions that can cause symptoms like MCI. These include depression, sleep problems, and side effects from medicines.  How is it treated?  There are no medicines to treat MCI or to keep it from progressing to dementia. But treating conditions like high blood pressure and diabetes may help. A person with MCI needs routine follow-up visits with their doctor to check on changes in the person's mental skills.  How can you care for yourself at home?  Keeping your body active can help slow MCI. Exercises like walking can help. Try to stay active mentally too. Read or do things like crossword puzzles if you enjoy doing them.  If you need help coping with MCI, you may want to get support from family, friends, a support group, or a counselor who works with people who have MCI.  Though the future isn't always clear, it can be good to plan ahead with instructions for your care. These are called advanced directives. Having a plan can help make sure that you get the care you want.  Current as of: December 3, 2024  Content Version: 14.4  © 0362-4105 [employer].   Care instructions adapted under license by [employer]. If you have questions about a medical condition or this instruction, always ask your healthcare professional. [email or web address] disclaims any warranty or liability for your use of this information.         Learning About Being Active as an Older Adult  Why is being active important as you get older?     Being active is one of the best things you can do for your health. And it's never too late to start. Being active--or getting active, if you aren't already--has definite benefits. It can:  Give you more energy,  Keep your mind sharp.  Improve balance to reduce your risk of falls.  Help you manage chronic illness with fewer medicines.  No matter how old you are, how fit you are, or what health problems you have,

## 2025-03-19 LAB
ALBUMIN SERPL-MCNC: 3.8 G/DL (ref 3.5–5)
ALBUMIN/GLOB SERPL: 1.4 (ref 1.1–2.2)
ALP SERPL-CCNC: 72 U/L (ref 45–117)
ALT SERPL-CCNC: 15 U/L (ref 12–78)
ANION GAP SERPL CALC-SCNC: 5 MMOL/L (ref 2–12)
AST SERPL-CCNC: 14 U/L (ref 15–37)
BASOPHILS # BLD: 0.06 K/UL (ref 0–0.1)
BASOPHILS NFR BLD: 1 % (ref 0–1)
BILIRUB SERPL-MCNC: 0.5 MG/DL (ref 0.2–1)
BUN SERPL-MCNC: 28 MG/DL (ref 6–20)
BUN/CREAT SERPL: 20 (ref 12–20)
CALCIUM SERPL-MCNC: 9.6 MG/DL (ref 8.5–10.1)
CHLORIDE SERPL-SCNC: 106 MMOL/L (ref 97–108)
CO2 SERPL-SCNC: 27 MMOL/L (ref 21–32)
CREAT SERPL-MCNC: 1.39 MG/DL (ref 0.55–1.02)
DIFFERENTIAL METHOD BLD: NORMAL
EOSINOPHIL # BLD: 0.18 K/UL (ref 0–0.4)
EOSINOPHIL NFR BLD: 2.9 % (ref 0–7)
ERYTHROCYTE [DISTWIDTH] IN BLOOD BY AUTOMATED COUNT: 13.4 % (ref 11.5–14.5)
EST. AVERAGE GLUCOSE BLD GHB EST-MCNC: 123 MG/DL
GLOBULIN SER CALC-MCNC: 2.7 G/DL (ref 2–4)
GLUCOSE SERPL-MCNC: 173 MG/DL (ref 65–100)
HBA1C MFR BLD: 5.9 % (ref 4–5.6)
HCT VFR BLD AUTO: 42.8 % (ref 35–47)
HGB BLD-MCNC: 13.5 G/DL (ref 11.5–16)
IMM GRANULOCYTES # BLD AUTO: 0.03 K/UL (ref 0–0.04)
IMM GRANULOCYTES NFR BLD AUTO: 0.5 % (ref 0–0.5)
LYMPHOCYTES # BLD: 0.83 K/UL (ref 0.8–3.5)
LYMPHOCYTES NFR BLD: 13.2 % (ref 12–49)
MCH RBC QN AUTO: 29.5 PG (ref 26–34)
MCHC RBC AUTO-ENTMCNC: 31.5 G/DL (ref 30–36.5)
MCV RBC AUTO: 93.7 FL (ref 80–99)
MONOCYTES # BLD: 0.66 K/UL (ref 0–1)
MONOCYTES NFR BLD: 10.5 % (ref 5–13)
NEUTS SEG # BLD: 4.53 K/UL (ref 1.8–8)
NEUTS SEG NFR BLD: 71.9 % (ref 32–75)
NRBC # BLD: 0 K/UL (ref 0–0.01)
NRBC BLD-RTO: 0 PER 100 WBC
PLATELET # BLD AUTO: 176 K/UL (ref 150–400)
PMV BLD AUTO: 11.8 FL (ref 8.9–12.9)
POTASSIUM SERPL-SCNC: 4.9 MMOL/L (ref 3.5–5.1)
PROT SERPL-MCNC: 6.5 G/DL (ref 6.4–8.2)
RBC # BLD AUTO: 4.57 M/UL (ref 3.8–5.2)
SODIUM SERPL-SCNC: 138 MMOL/L (ref 136–145)
WBC # BLD AUTO: 6.3 K/UL (ref 3.6–11)

## 2025-03-21 ENCOUNTER — RESULTS FOLLOW-UP (OUTPATIENT)
Age: 89
End: 2025-03-21

## 2025-03-31 ENCOUNTER — TELEPHONE (OUTPATIENT)
Age: 89
End: 2025-03-31

## 2025-03-31 NOTE — TELEPHONE ENCOUNTER
Patient's daughter Irma just called the office stating she just call [facility] for her mother(Ms Zambrano Nay).

## 2025-03-31 NOTE — TELEPHONE ENCOUNTER
Melchor from [facility] [phone]     Called our office to leave a message for patient to call Client Services at [facility] to schedule rehab services.  They have been trying to call her and have left messages but have not heard back.

## 2025-06-03 DIAGNOSIS — F33.1 MAJOR DEPRESSIVE DISORDER, RECURRENT, MODERATE (HCC): ICD-10-CM

## 2025-06-03 DIAGNOSIS — J45.20 MILD INTERMITTENT REACTIVE AIRWAY DISEASE WITHOUT COMPLICATION: ICD-10-CM

## 2025-06-03 RX ORDER — ALBUTEROL SULFATE 90 UG/1
2 INHALANT RESPIRATORY (INHALATION) EVERY 4 HOURS PRN
Qty: 18 G | Refills: 1 | Status: SHIPPED | OUTPATIENT
Start: 2025-06-03

## 2025-06-03 RX ORDER — MIRTAZAPINE 15 MG/1
15 TABLET, FILM COATED ORAL NIGHTLY
Qty: 90 TABLET | Refills: 0 | Status: SHIPPED | OUTPATIENT
Start: 2025-06-03 | End: 2025-06-05

## 2025-06-03 NOTE — TELEPHONE ENCOUNTER
Last office visit 3/18/25  Next Appt  With Internal Medicine (Cinthya Scanlon MD)  09/23/2025 at 12:40 PM      Last fill on albuterol 18g  with 1 additional refills     Last fill on remeron 3/11/25 #90 with no additional refills     Last fill on prozac 3/11/25 #90 with no additional refills

## 2025-06-04 DIAGNOSIS — F33.1 MAJOR DEPRESSIVE DISORDER, RECURRENT, MODERATE (HCC): ICD-10-CM

## 2025-06-05 RX ORDER — MIRTAZAPINE 15 MG/1
15 TABLET, FILM COATED ORAL NIGHTLY
Qty: 90 TABLET | Refills: 0 | Status: SHIPPED | OUTPATIENT
Start: 2025-06-05

## 2025-06-05 NOTE — TELEPHONE ENCOUNTER
Last office visit 3/18/25  Next Appt  With Internal Medicine (Cinthya Scanlon MD)  09/23/2025 at 12:40 PM    Last fill on remeron 6/3/25 #90 with no additional refills     Last fill on prozac 6/3/25 #90 with no additional refills

## 2025-09-01 DIAGNOSIS — F33.1 MAJOR DEPRESSIVE DISORDER, RECURRENT, MODERATE (HCC): ICD-10-CM

## 2025-09-02 RX ORDER — MIRTAZAPINE 15 MG/1
15 TABLET, FILM COATED ORAL NIGHTLY
Qty: 90 TABLET | Refills: 1 | Status: SHIPPED | OUTPATIENT
Start: 2025-09-02